# Patient Record
Sex: FEMALE | Race: BLACK OR AFRICAN AMERICAN | NOT HISPANIC OR LATINO | Employment: FULL TIME | URBAN - METROPOLITAN AREA
[De-identification: names, ages, dates, MRNs, and addresses within clinical notes are randomized per-mention and may not be internally consistent; named-entity substitution may affect disease eponyms.]

---

## 2017-01-09 ENCOUNTER — PATIENT MESSAGE (OUTPATIENT)
Dept: OBSTETRICS AND GYNECOLOGY | Facility: CLINIC | Age: 22
End: 2017-01-09

## 2017-02-01 ENCOUNTER — LAB VISIT (OUTPATIENT)
Dept: LAB | Facility: HOSPITAL | Age: 22
End: 2017-02-01
Attending: FAMILY MEDICINE
Payer: COMMERCIAL

## 2017-02-01 ENCOUNTER — OFFICE VISIT (OUTPATIENT)
Dept: FAMILY MEDICINE | Facility: CLINIC | Age: 22
End: 2017-02-01
Payer: COMMERCIAL

## 2017-02-01 VITALS
HEIGHT: 59 IN | TEMPERATURE: 98 F | RESPIRATION RATE: 12 BRPM | WEIGHT: 122.81 LBS | BODY MASS INDEX: 24.76 KG/M2 | SYSTOLIC BLOOD PRESSURE: 118 MMHG | OXYGEN SATURATION: 98 % | DIASTOLIC BLOOD PRESSURE: 74 MMHG | HEART RATE: 77 BPM

## 2017-02-01 LAB
B-HCG UR QL: NEGATIVE
CTP QC/QA: YES
HCG INTACT+B SERPL-ACNC: <1.2 MIU/ML

## 2017-02-01 PROCEDURE — 36415 COLL VENOUS BLD VENIPUNCTURE: CPT | Mod: PO

## 2017-02-01 PROCEDURE — 81025 URINE PREGNANCY TEST: CPT | Mod: S$GLB,,, | Performed by: PHYSICIAN ASSISTANT

## 2017-02-01 PROCEDURE — 99213 OFFICE O/P EST LOW 20 MIN: CPT | Mod: 25,S$GLB,, | Performed by: PHYSICIAN ASSISTANT

## 2017-02-01 PROCEDURE — 84702 CHORIONIC GONADOTROPIN TEST: CPT

## 2017-02-01 PROCEDURE — 99999 PR PBB SHADOW E&M-EST. PATIENT-LVL III: CPT | Mod: PBBFAC,,, | Performed by: PHYSICIAN ASSISTANT

## 2017-02-01 NOTE — PROGRESS NOTES
Subjective:       Patient ID: Kate Bailey is a 21 y.o. female.    Chief Complaint: Possible Pregnancy (positive home pregnacy test)    HPI: 22 yo female presents for positive home pregnancy. LMP 12/8/16. Pt has history of abnormal cycles. She is currently taking OCPs. She denies breast tenderness or nausea.     Review of Systems   Gastrointestinal: Negative for nausea.   Genitourinary: Positive for menstrual problem.        - breast tenderness       Objective:      Physical Exam   Constitutional: She is oriented to person, place, and time. She appears well-developed and well-nourished. No distress.   HENT:   Head: Normocephalic and atraumatic.   Neurological: She is alert and oriented to person, place, and time.   Skin: She is not diaphoretic.   Psychiatric: She has a normal mood and affect. Her behavior is normal.   Vitals reviewed.      Assessment:       1. Positive home pregnancy test        Plan:         Kate was seen today for possible pregnancy.    Diagnoses and all orders for this visit:    Positive home pregnancy test  -     POCT urine pregnancy, negative will get HCG  -     HCG, QUANTITATIVE, PREGNANCY; Future

## 2017-02-01 NOTE — MR AVS SNAPSHOT
[facility]""  [address]  [place] [address]  Phone: [phone]  Fax: [phone]                  Kate Bailey   2017 8:00 AM   Office Visit    Description:  Female : 1995   Provider:  Jenna Alvarez PA-C   Department:  [facility]""           Reason for Visit     Possible Pregnancy           Diagnoses this Visit        Comments    Positive home pregnancy test    -  Primary            To Do List           Future Appointments        Provider Department Dept Phone    2017 8:30 AM LAB, [facility] [phone]      Goals (5 Years of Data)     None      Follow-Up and Disposition     Return if symptoms worsen or fail to improve.      [facility]     [facility] Care [facility] -  Assistance  Registered nurses in the [facility] provide clinical advisement, health education, appointment booking, and other advisory services.  Call for this free service at [phone].             Medications           Message regarding Medications     Verify the changes and/or additions to your medication regime listed below are the same as discussed with your clinician today.  If any of these changes or additions are incorrect, please notify your healthcare provider.        STOP taking these medications     propranolol (INDERAL) 40 MG tablet Take 1 tablet (40 mg total) by mouth 3 (three) times daily.           Verify that the below list of medications is an accurate representation of the medications you are currently taking.  If none reported, the list may be blank. If incorrect, please contact your healthcare provider. Carry this list with you in case of emergency.           Current Medications     norethindrone-e.estradiol-iron 1 mg-20 mcg (24)/75 mg (4) Oral per tablet Take 1 tablet by mouth once daily.    triamcinolone acetonide 0.1% (KENALOG) 0.1 % cream Apply topically 2 (two) times daily.           Clinical  "Reference Information           Vital Signs - Last Recorded  Most recent update: 2/1/2017  8:16 AM by Eva Bailey MA    BP Pulse Temp Resp Ht    118/74 (BP Location: Right arm, Patient Position: Sitting, BP Method: Manual) 77 98.2 °F (36.8 °C) (Oral) 12 4' 11" (1.499 m)    Wt LMP SpO2 BMI    55.7 kg (122 lb 12.7 oz) 12/08/2016 98% 24.8 kg/m2      Blood Pressure          Most Recent Value    BP  118/74      Allergies as of 2/1/2017     No Known Allergies      Immunizations Administered on Date of Encounter - 2/1/2017     None      Orders Placed During Today's Visit      Normal Orders This Visit    POCT urine pregnancy     Future Labs/Procedures Expected by Expires    HCG, QUANTITATIVE, PREGNANCY  2/1/2017 4/2/2018      "

## 2017-02-06 ENCOUNTER — PATIENT MESSAGE (OUTPATIENT)
Dept: OBSTETRICS AND GYNECOLOGY | Facility: CLINIC | Age: 22
End: 2017-02-06

## 2017-02-14 ENCOUNTER — PATIENT MESSAGE (OUTPATIENT)
Dept: FAMILY MEDICINE | Facility: CLINIC | Age: 22
End: 2017-02-14

## 2017-02-23 ENCOUNTER — LAB VISIT (OUTPATIENT)
Dept: LAB | Facility: HOSPITAL | Age: 22
End: 2017-02-23
Attending: FAMILY MEDICINE
Payer: COMMERCIAL

## 2017-02-23 ENCOUNTER — OFFICE VISIT (OUTPATIENT)
Dept: FAMILY MEDICINE | Facility: CLINIC | Age: 22
End: 2017-02-23
Payer: COMMERCIAL

## 2017-02-23 VITALS
RESPIRATION RATE: 12 BRPM | DIASTOLIC BLOOD PRESSURE: 62 MMHG | WEIGHT: 125.69 LBS | SYSTOLIC BLOOD PRESSURE: 126 MMHG | TEMPERATURE: 99 F | HEART RATE: 103 BPM | HEIGHT: 59 IN | BODY MASS INDEX: 25.34 KG/M2 | OXYGEN SATURATION: 97 %

## 2017-02-23 DIAGNOSIS — Z00.00 ANNUAL PHYSICAL EXAM: ICD-10-CM

## 2017-02-23 DIAGNOSIS — Z00.00 ANNUAL PHYSICAL EXAM: Primary | ICD-10-CM

## 2017-02-23 DIAGNOSIS — Z23 NEED FOR HPV VACCINATION: ICD-10-CM

## 2017-02-23 DIAGNOSIS — G43.919 INTRACTABLE MIGRAINE WITHOUT STATUS MIGRAINOSUS, UNSPECIFIED MIGRAINE TYPE: ICD-10-CM

## 2017-02-23 LAB
ANION GAP SERPL CALC-SCNC: 7 MMOL/L
BASOPHILS # BLD AUTO: 0.01 K/UL
BASOPHILS NFR BLD: 0.1 %
BUN SERPL-MCNC: 9 MG/DL
CALCIUM SERPL-MCNC: 9.5 MG/DL
CHLORIDE SERPL-SCNC: 106 MMOL/L
CHOLEST/HDLC SERPL: 4.2 {RATIO}
CO2 SERPL-SCNC: 25 MMOL/L
CREAT SERPL-MCNC: 0.8 MG/DL
DIFFERENTIAL METHOD: NORMAL
EOSINOPHIL # BLD AUTO: 0.2 K/UL
EOSINOPHIL NFR BLD: 2.5 %
ERYTHROCYTE [DISTWIDTH] IN BLOOD BY AUTOMATED COUNT: 12.2 %
EST. GFR  (AFRICAN AMERICAN): >60 ML/MIN/1.73 M^2
EST. GFR  (NON AFRICAN AMERICAN): >60 ML/MIN/1.73 M^2
GLUCOSE SERPL-MCNC: 82 MG/DL
HCT VFR BLD AUTO: 38.5 %
HDL/CHOLESTEROL RATIO: 23.9 %
HDLC SERPL-MCNC: 180 MG/DL
HDLC SERPL-MCNC: 43 MG/DL
HGB BLD-MCNC: 13.2 G/DL
LDLC SERPL CALC-MCNC: 118.6 MG/DL
LYMPHOCYTES # BLD AUTO: 2.8 K/UL
LYMPHOCYTES NFR BLD: 37.2 %
MCH RBC QN AUTO: 30.4 PG
MCHC RBC AUTO-ENTMCNC: 34.3 %
MCV RBC AUTO: 89 FL
MONOCYTES # BLD AUTO: 0.6 K/UL
MONOCYTES NFR BLD: 8.2 %
NEUTROPHILS # BLD AUTO: 3.9 K/UL
NEUTROPHILS NFR BLD: 51.9 %
NONHDLC SERPL-MCNC: 137 MG/DL
PLATELET # BLD AUTO: 265 K/UL
PMV BLD AUTO: 10.8 FL
POTASSIUM SERPL-SCNC: 4.1 MMOL/L
RBC # BLD AUTO: 4.34 M/UL
SODIUM SERPL-SCNC: 138 MMOL/L
TRIGL SERPL-MCNC: 92 MG/DL
TSH SERPL DL<=0.005 MIU/L-ACNC: 0.4 UIU/ML
WBC # BLD AUTO: 7.58 K/UL

## 2017-02-23 PROCEDURE — 99999 PR PBB SHADOW E&M-EST. PATIENT-LVL III: CPT | Mod: PBBFAC,,, | Performed by: PHYSICIAN ASSISTANT

## 2017-02-23 PROCEDURE — 90651 9VHPV VACCINE 2/3 DOSE IM: CPT | Mod: S$GLB,,, | Performed by: PHYSICIAN ASSISTANT

## 2017-02-23 PROCEDURE — 84443 ASSAY THYROID STIM HORMONE: CPT

## 2017-02-23 PROCEDURE — 99395 PREV VISIT EST AGE 18-39: CPT | Mod: 25,S$GLB,, | Performed by: PHYSICIAN ASSISTANT

## 2017-02-23 PROCEDURE — 36415 COLL VENOUS BLD VENIPUNCTURE: CPT | Mod: PO

## 2017-02-23 PROCEDURE — 85025 COMPLETE CBC W/AUTO DIFF WBC: CPT

## 2017-02-23 PROCEDURE — 86703 HIV-1/HIV-2 1 RESULT ANTBDY: CPT

## 2017-02-23 PROCEDURE — 80048 BASIC METABOLIC PNL TOTAL CA: CPT

## 2017-02-23 PROCEDURE — 90471 IMMUNIZATION ADMIN: CPT | Mod: S$GLB,,, | Performed by: PHYSICIAN ASSISTANT

## 2017-02-23 PROCEDURE — 80061 LIPID PANEL: CPT

## 2017-02-23 RX ORDER — RIZATRIPTAN BENZOATE 10 MG/1
10 TABLET ORAL
Qty: 20 TABLET | Refills: 0 | Status: SHIPPED | OUTPATIENT
Start: 2017-02-23 | End: 2017-08-16

## 2017-02-23 NOTE — PROGRESS NOTES
Subjective:       Patient ID: Kate Bailey is a 21 y.o. female.    Chief Complaint: Annual Exam    HPI 20 yo female presents for annual. Complains of chronic migraines worse over the past couple months. Frontal, pressure in eyes. Tried tylenol, ibuprofen, Excedrin, beta blockers with no relief. Has nausea and sensitivity to light. Also having anxiety and depression. Has not been on meds in the past. States she feels like she has been handling it well on her own.     Review of Systems   Constitutional: Positive for unexpected weight change (3 lbs in 2 week). Negative for chills and fever.   Respiratory: Negative for cough, shortness of breath and wheezing.    Cardiovascular: Positive for palpitations. Negative for chest pain.   Gastrointestinal: Negative for abdominal pain, constipation, diarrhea, nausea and vomiting.   Genitourinary: Negative for dysuria, pelvic pain, vaginal discharge and vaginal pain.   Neurological: Positive for headaches.   Psychiatric/Behavioral: Positive for dysphoric mood and sleep disturbance. Negative for confusion and decreased concentration. The patient is nervous/anxious.        Objective:      Physical Exam   Constitutional: She is oriented to person, place, and time. She appears well-developed and well-nourished. No distress.   HENT:   Head: Normocephalic and atraumatic.   Cardiovascular: Normal rate and regular rhythm.    No murmur heard.  Pulmonary/Chest: Effort normal and breath sounds normal.   Abdominal: Soft. Bowel sounds are normal. She exhibits no distension. There is no tenderness. There is no rigidity and no guarding.   Neurological: She is alert and oriented to person, place, and time.   Tongue midline, facial expressions equal bilaterally, normal rapid hand movements, normal heel to shin, normal finger to nose     Skin: Skin is warm and dry. She is not diaphoretic.   Psychiatric: She has a normal mood and affect. Her behavior is normal.   Vitals reviewed.       Assessment:       1. Annual physical exam    2. Need for HPV vaccination    3. Intractable migraine without status migrainosus, unspecified migraine type        Plan:         Kate was seen today for annual exam.    Diagnoses and all orders for this visit:    Annual physical exam  -     TSH; Future  -     Basic metabolic panel; Future  -     Lipid panel; Future  -     CBC auto differential; Future  -     HIV-1 and HIV-2 antibodies; Future    Need for HPV vaccination  -     HPV Vaccine (9-Valent) (3 Dose) (IM)    Intractable migraine without status migrainosus, unspecified migraine type  -     rizatriptan (MAXALT) 10 MG tablet; Take 1 tablet (10 mg total) by mouth as needed for Migraine (may repeat in 2 hrs if no relief).

## 2017-02-23 NOTE — MR AVS SNAPSHOT
[facility]  [address]  [place] [address]  Phone: [phone]  Fax: [phone]                  Kate Bailey   2017 3:00 PM   Office Visit    Description:  Female : 1995   Provider:  Jenna Alvarez PA-C   Department:  [facility]           Reason for Visit     Annual Exam           Diagnoses this Visit        Comments    Annual physical exam    -  Primary     Need for HPV vaccination         Intractable migraine without status migrainosus, unspecified migraine type                To Do List           Future Appointments        Provider Department Dept Phone    2017 3:45 PM LAB, [facility] [phone]      Goals (5 Years of Data)     None       These Medications        Disp Refills Start End    rizatriptan (MAXALT) 10 MG tablet 20 tablet 0 2017 3/25/2017    Take 1 tablet (10 mg total) by mouth as needed for Migraine (may repeat in 2 hrs if no relief). - Oral    Pharmacy: [facility]/pharmacy #[id number] - [place] [address] #: [phone]         [facility]     [facility] On Call Nurse Care Line -  Assistance  Registered nurses in the [facility] provide clinical advisement, health education, appointment booking, and other advisory services.  Call for this free service at [phone].             Medications           Message regarding Medications     Verify the changes and/or additions to your medication regime listed below are the same as discussed with your clinician today.  If any of these changes or additions are incorrect, please notify your healthcare provider.        START taking these NEW medications        Refills    rizatriptan (MAXALT) 10 MG tablet 0    Sig: Take 1 tablet (10 mg total) by mouth as needed for Migraine (may repeat in 2 hrs if no relief).    Class: Normal    Route: Oral           Verify that the below list of medications is an accurate  "representation of the medications you are currently taking.  If none reported, the list may be blank. If incorrect, please contact your healthcare provider. Carry this list with you in case of emergency.           Current Medications     norethindrone-e.estradiol-iron 1 mg-20 mcg (24)/75 mg (4) Oral per tablet Take 1 tablet by mouth once daily.    rizatriptan (MAXALT) 10 MG tablet Take 1 tablet (10 mg total) by mouth as needed for Migraine (may repeat in 2 hrs if no relief).    triamcinolone acetonide 0.1% (KENALOG) 0.1 % cream Apply topically 2 (two) times daily.           Clinical Reference Information           Your Vitals Were     BP Pulse Temp Resp Height Weight    126/62 (BP Location: Left arm, Patient Position: Sitting, BP Method: Manual) 103 98.7 °F (37.1 °C) (Oral) 12 4' 11" (1.499 m) 57 kg (125 lb 10.6 oz)    Last Period SpO2 BMI          02/01/2017 97% 25.38 kg/m2        Blood Pressure          Most Recent Value    BP  126/62      Allergies as of 2/23/2017     No Known Allergies      Immunizations Administered on Date of Encounter - 2/23/2017     Name Date Dose VIS Date Route    HPV 9-Valent  Incomplete 0.5 mL 12/2/2016 Intramuscular      Orders Placed During Today's Visit      Normal Orders This Visit    HPV Vaccine (9-Valent) (3 Dose) (IM)     Future Labs/Procedures Expected by Expires    Basic metabolic panel  2/23/2017 2/23/2018    CBC auto differential  2/23/2017 2/23/2018    HIV-1 and HIV-2 antibodies  2/23/2017 2/23/2018    Lipid panel  2/23/2017 2/23/2018    TSH  2/23/2017 2/23/2018      Language Assistance Services     ATTENTION: Language assistance services are available, free of charge. Please call [phone].      ATENCIÓN: Si latishala octavio, tiene a garcia disposición servicios gratuitos de asistencia lingüística. Llame al [phone].     CHÚ Ý: N?u b?n nói Ti?ng Vi?t, có các d?ch v? h? tr? ngôn ng? mi?n phí dành cho b?n. G?i s? [phone].         [facility] complies " with applicable Federal civil rights laws and does not discriminate on the basis of race, color, national origin, age, disability, or sex.

## 2017-02-24 ENCOUNTER — HOSPITAL ENCOUNTER (EMERGENCY)
Facility: HOSPITAL | Age: 22
Discharge: HOME | End: 2017-02-24
Attending: EMERGENCY MEDICINE
Payer: COMMERCIAL

## 2017-02-24 VITALS
WEIGHT: 122 LBS | TEMPERATURE: 99 F | SYSTOLIC BLOOD PRESSURE: 125 MMHG | RESPIRATION RATE: 14 BRPM | HEART RATE: 110 BPM | OXYGEN SATURATION: 97 % | BODY MASS INDEX: 24.64 KG/M2 | DIASTOLIC BLOOD PRESSURE: 67 MMHG

## 2017-02-24 DIAGNOSIS — R55 SYNCOPE, UNSPECIFIED SYNCOPE TYPE: Primary | ICD-10-CM

## 2017-02-24 LAB
ALBUMIN SERPL BCP-MCNC: 4.1 G/DL
ALP SERPL-CCNC: 67 U/L
ALT SERPL W/O P-5'-P-CCNC: 35 U/L
ANION GAP SERPL CALC-SCNC: 9 MMOL/L
AST SERPL-CCNC: 27 U/L
B-HCG UR QL: NEGATIVE
BASOPHILS # BLD AUTO: 0.01 K/UL
BASOPHILS NFR BLD: 0.1 %
BILIRUB SERPL-MCNC: 0.7 MG/DL
BUN SERPL-MCNC: 8 MG/DL
CALCIUM SERPL-MCNC: 9.7 MG/DL
CHLORIDE SERPL-SCNC: 104 MMOL/L
CO2 SERPL-SCNC: 25 MMOL/L
CREAT SERPL-MCNC: 0.8 MG/DL
CTP QC/QA: YES
DIFFERENTIAL METHOD: ABNORMAL
EOSINOPHIL # BLD AUTO: 0 K/UL
EOSINOPHIL NFR BLD: 0 %
ERYTHROCYTE [DISTWIDTH] IN BLOOD BY AUTOMATED COUNT: 12 %
EST. GFR  (AFRICAN AMERICAN): >60 ML/MIN/1.73 M^2
EST. GFR  (NON AFRICAN AMERICAN): >60 ML/MIN/1.73 M^2
GLUCOSE SERPL-MCNC: 107 MG/DL
HCT VFR BLD AUTO: 40.1 %
HGB BLD-MCNC: 13.9 G/DL
HIV 1+2 AB+HIV1 P24 AG SERPL QL IA: NEGATIVE
LYMPHOCYTES # BLD AUTO: 0.2 K/UL
LYMPHOCYTES NFR BLD: 3 %
MCH RBC QN AUTO: 30.3 PG
MCHC RBC AUTO-ENTMCNC: 34.7 %
MCV RBC AUTO: 87 FL
MONOCYTES # BLD AUTO: 0.4 K/UL
MONOCYTES NFR BLD: 5.3 %
NEUTROPHILS # BLD AUTO: 7 K/UL
NEUTROPHILS NFR BLD: 91.3 %
PLATELET # BLD AUTO: 246 K/UL
PMV BLD AUTO: 10.2 FL
POTASSIUM SERPL-SCNC: 4 MMOL/L
PROT SERPL-MCNC: 7.2 G/DL
RBC # BLD AUTO: 4.59 M/UL
SODIUM SERPL-SCNC: 138 MMOL/L
WBC # BLD AUTO: 7.67 K/UL

## 2017-02-24 PROCEDURE — 96361 HYDRATE IV INFUSION ADD-ON: CPT

## 2017-02-24 PROCEDURE — 96374 THER/PROPH/DIAG INJ IV PUSH: CPT

## 2017-02-24 PROCEDURE — 80053 COMPREHEN METABOLIC PANEL: CPT

## 2017-02-24 PROCEDURE — 85025 COMPLETE CBC W/AUTO DIFF WBC: CPT

## 2017-02-24 PROCEDURE — 93005 ELECTROCARDIOGRAM TRACING: CPT

## 2017-02-24 PROCEDURE — 63600175 PHARM REV CODE 636 W HCPCS: Performed by: EMERGENCY MEDICINE

## 2017-02-24 PROCEDURE — 81025 URINE PREGNANCY TEST: CPT | Performed by: EMERGENCY MEDICINE

## 2017-02-24 PROCEDURE — 99284 EMERGENCY DEPT VISIT MOD MDM: CPT | Mod: 25

## 2017-02-24 PROCEDURE — 25000003 PHARM REV CODE 250: Performed by: EMERGENCY MEDICINE

## 2017-02-24 RX ORDER — PROCHLORPERAZINE EDISYLATE 5 MG/ML
5 INJECTION INTRAMUSCULAR; INTRAVENOUS
Status: COMPLETED | OUTPATIENT
Start: 2017-02-24 | End: 2017-02-24

## 2017-02-24 RX ADMIN — SODIUM CHLORIDE 1000 ML: 0.9 INJECTION, SOLUTION INTRAVENOUS at 12:02

## 2017-02-24 RX ADMIN — PROCHLORPERAZINE EDISYLATE 5 MG: 5 INJECTION INTRAMUSCULAR; INTRAVENOUS at 12:02

## 2017-02-24 NOTE — ED AVS SNAPSHOT
[facility]  [address] [place] [address]               Kate Beckman Leo   2017 11:31 AM   ED    Description:  Female : 1995   Department:  [facility]           Your Care was Coordinated By:     Provider Role From To    Raul Correa Jr., MD Attending Provider 17 1131 --      Reason for Visit     Possible allergic reaction to HPV vaccination     Loss of Consciousness           Diagnoses this Visit        Comments    Syncope, unspecified syncope type    -  Primary       ED Disposition     None           To Do List           Follow-up Information     Follow up with Richmond Hardy MD. Schedule an appointment as soon as possible for a visit in 2 days.    Specialty:  Family Medicine    Why:  Follow-up with your regular doctor next 2 days.  Return for new or worsening symptoms.  Rest.  Drink plan fluids.    Contact information:    [address]  [place] [id number] [phone]        [facility] On Call     [facility] Nurse Care [facility] -  Assistance  Registered nurses in the [facility] provide clinical advisement, health education, appointment booking, and other advisory services.  Call for this free service at [phone].             Medications           Message regarding Medications     Verify the changes and/or additions to your medication regime listed below are the same as discussed with your clinician today.  If any of these changes or additions are incorrect, please notify your healthcare provider.        These medications were administered today        Dose Freq    sodium chloride 0.9% bolus 1,000 mL 1,000 mL ED 1 Time    Sig: Inject 1,000 mLs into the vein ED 1 Time.    Class: Normal    Route: Intravenous    prochlorperazine injection Soln 5 mg 5 mg ED 1 Time    Sig: Inject 1 mL (5 mg total) into the vein ED 1 Time.    Class: Normal    Route: Intravenous      STOP taking these medications      norethindrone-e.estradiol-iron 1 mg-20 mcg (24)/75 mg (4) Oral per tablet Take 1 tablet by mouth once daily.           Verify that the below list of medications is an accurate representation of the medications you are currently taking.  If none reported, the list may be blank. If incorrect, please contact your healthcare provider. Carry this list with you in case of emergency.           Current Medications     rizatriptan (MAXALT) 10 MG tablet Take 1 tablet (10 mg total) by mouth as needed for Migraine (may repeat in 2 hrs if no relief).    triamcinolone acetonide 0.1% (KENALOG) 0.1 % cream Apply topically 2 (two) times daily.           Clinical Reference Information           Your Vitals Were     BP                   123/64           Allergies as of 2/24/2017     No Known Allergies      Immunizations Administered on Date of Encounter - 2/24/2017     None      ED Micro, Lab, POCT     Start Ordered       Status Ordering Provider    02/24/17 1139 02/24/17 1139  CBC auto differential  Once      Final result     02/24/17 1139 02/24/17 1139  Comprehensive metabolic panel  STAT      Final result     02/24/17 1118 02/24/17 1117  POCT urine pregnancy  Once      Final result       ED Imaging Orders     None      Discharge References/Attachments     SYNCOPE, CAUSES OF (ENGLISH)       [facility] complies with applicable Federal civil rights laws and does not discriminate on the basis of race, color, national origin, age, disability, or sex.        Language Assistance Services     ATTENTION: Language assistance services are available, free of charge. Please call [phone].      ATENCIÓN: Si habla español, tiene a garcia disposición servicios gratuitos de asistencia lingüística. Llame al [phone].     CHÚ Ý: N?u b?n nói Ti?ng Vi?t, có các d?ch v? h? tr? ngôn ng? mi?n phí dành cho b?n. G?i s? [phone].

## 2017-02-24 NOTE — ED TRIAGE NOTES
First HPV vaccine yesterday, this morning started having nausea, dizziness, emesis x6. +Arm tenderness no redness noted. +LOC this morning. Unsure if she hit her head on anything.

## 2017-02-24 NOTE — ED PROVIDER NOTES
Encounter Date: 2/24/2017       History     Chief Complaint   Patient presents with    Possible allergic reaction to HPV vaccination     thinks she is having reaction to HPV vaccination; nausea, emesis, drowsiness, and feeling lightheaded; states she passed out this morning at 0800 and lost consciousness    Loss of Consciousness     states she does not remember what happened and she woke up on floor     Review of patient's allergies indicates:  No Known Allergies  HPI Comments: Chief complaint: Nausea, vomiting, lightheadedness, syncope    History of present illness: 21-year-old female resents the emergency department with complaints of nausea, vomiting, lightheadedness, and syncopal episode that happened this morning.  Patient was concerned that she may be having reaction to HPV vaccination from yesterday.  Patient has a mild headache.  She denies fever or chills.  She denies cough, shortness of breath, urinary symptoms, abdominal pain, diarrhea, skin changes, or lower extremity edema.     Past Medical History:   Diagnosis Date    Migraine     Recurrent epistaxis      Past Surgical History:   Procedure Laterality Date    ADENOIDECTOMY      TONSILLECTOMY       Family History   Problem Relation Age of Onset    Cancer Paternal Grandfather      breast     Hypertension Maternal Grandmother      Social History   Substance Use Topics    Smoking status: Never Smoker    Smokeless tobacco: None    Alcohol use No     Review of Systems   Constitutional: Negative for chills, diaphoresis and fever.   HENT: Negative for rhinorrhea, sinus pressure and sore throat.    Respiratory: Negative for shortness of breath.    Cardiovascular: Negative for chest pain and palpitations.   Gastrointestinal: Positive for nausea and vomiting. Negative for abdominal distention, abdominal pain, constipation and diarrhea.   Genitourinary: Negative for dysuria, flank pain, frequency and urgency.   Musculoskeletal: Negative for back pain,  myalgias and neck pain.   Skin: Negative for color change, pallor and rash.   Neurological: Positive for syncope, light-headedness and headaches. Negative for weakness.   Hematological: Does not bruise/bleed easily.   Psychiatric/Behavioral: Negative for agitation and behavioral problems.       Physical Exam   Initial Vitals   BP Pulse Resp Temp SpO2   02/24/17 1112 02/24/17 1112 02/24/17 1112 02/24/17 1112 02/24/17 1112   123/64 115 20 98.8 °F (37.1 °C) 97 %     Physical Exam    Nursing note and vitals reviewed.  Constitutional: She appears well-developed and well-nourished. She is not diaphoretic. No distress.   Alert, well-appearing female in no acute distress.   HENT:   Head: Normocephalic and atraumatic.   Right Ear: External ear normal.   Left Ear: External ear normal.   Nose: Nose normal.   Mouth/Throat: Oropharynx is clear and moist.   Eyes: Conjunctivae and EOM are normal. Pupils are equal, round, and reactive to light. Right eye exhibits no discharge. Left eye exhibits no discharge. No scleral icterus.   Neck: Normal range of motion. Neck supple.   Cardiovascular: Normal rate, regular rhythm, normal heart sounds and intact distal pulses.   No murmur heard.  Pulmonary/Chest: Breath sounds normal. No respiratory distress. She has no wheezes. She has no rhonchi. She has no rales.   Abdominal: Soft. Bowel sounds are normal. She exhibits no distension and no mass. There is no tenderness. There is no rebound and no guarding.   Musculoskeletal: Normal range of motion. She exhibits no edema or tenderness.   Neurological: She is alert and oriented to person, place, and time. She has normal strength. No cranial nerve deficit or sensory deficit.   GCS is 15.  Patient is alert and oriented to person, place, time, and events.  Cranial nerves II-XII intact by exam.  Strength and sensation equal and intact in all 4 extremities.  There is no evidence of ataxia or dismetria.   Skin: Skin is warm and dry. No rash noted. No  erythema. No pallor.   Psychiatric: She has a normal mood and affect. Her behavior is normal. Judgment and thought content normal.         ED Course   Procedures  Labs Reviewed   CBC W/ AUTO DIFFERENTIAL   COMPREHENSIVE METABOLIC PANEL   POCT URINE PREGNANCY     EKG Readings: (Independently Interpreted)   Initial Reading: No STEMI.   EKG reviewed and interpreted by me shows sinus rhythm at a rate of 96.  IL, QRS, QT intervals within normal limits.  Is a right axis deviation.  There is normal R-wave progression.  There are no ST segment or T-wave ischemic findings.          Medical Decision Making:   ED Management:  This is the emergent evaluation of a 19-year-old female presents the emergency department complaining of lightheadedness, nausea, vomiting that started this morning.  Patient also state that she had a syncopal episode.  No associated injuries. Differential diagnosis at the time of initial evaluation included, but was not limited to: Vasovagal episode, ectopic pregnancy, severe anemia, metabolic disturbance, dehydration, viral illness.  I also considered cardiac dysrhythmia.    Patient feeling better after hydration in the emergency department.  Pregnancy test is negative.  No anemia or leukocytosis.  No metabolic disturbance.  Patient is ambulatory and back to baseline at this time.  Believe she is safe and stable for discharge.  Advise rest, drink plan fluids, thiamine with her PCP in the next 24-48 hours.  She was also advised return for any new or worsening symptoms.                     ED Course     Clinical Impression:   Syncope, nausea, lightheadedness        Raul Correa Jr., MD  02/24/17 2575

## 2017-03-26 ENCOUNTER — PATIENT MESSAGE (OUTPATIENT)
Dept: OBSTETRICS AND GYNECOLOGY | Facility: CLINIC | Age: 22
End: 2017-03-26

## 2017-03-27 ENCOUNTER — TELEPHONE (OUTPATIENT)
Dept: OBSTETRICS AND GYNECOLOGY | Facility: CLINIC | Age: 22
End: 2017-03-27

## 2017-03-27 DIAGNOSIS — N92.6 IRREGULAR PERIODS/MENSTRUAL CYCLES: ICD-10-CM

## 2017-03-27 DIAGNOSIS — Z30.011 INITIATION OF OCP (BCP): Primary | ICD-10-CM

## 2017-03-27 RX ORDER — NORGESTIMATE AND ETHINYL ESTRADIOL 0.25-0.035
1 KIT ORAL DAILY
Qty: 90 TABLET | Refills: 1 | Status: SHIPPED | OUTPATIENT
Start: 2017-03-27 | End: 2017-08-15 | Stop reason: CLARIF

## 2017-04-12 ENCOUNTER — OFFICE VISIT (OUTPATIENT)
Dept: OBSTETRICS AND GYNECOLOGY | Facility: CLINIC | Age: 22
End: 2017-04-12
Payer: COMMERCIAL

## 2017-04-12 VITALS
BODY MASS INDEX: 25.34 KG/M2 | SYSTOLIC BLOOD PRESSURE: 118 MMHG | HEIGHT: 59 IN | DIASTOLIC BLOOD PRESSURE: 70 MMHG | WEIGHT: 125.69 LBS

## 2017-04-12 DIAGNOSIS — Z30.09 FAMILY PLANNING COUNSELING: ICD-10-CM

## 2017-04-12 DIAGNOSIS — Z01.419 WELL WOMAN EXAM WITH ROUTINE GYNECOLOGICAL EXAM: Primary | ICD-10-CM

## 2017-04-12 PROCEDURE — 99395 PREV VISIT EST AGE 18-39: CPT | Mod: S$GLB,,, | Performed by: OBSTETRICS & GYNECOLOGY

## 2017-04-12 PROCEDURE — 99999 PR PBB SHADOW E&M-EST. PATIENT-LVL II: CPT | Mod: PBBFAC,,, | Performed by: OBSTETRICS & GYNECOLOGY

## 2017-04-12 RX ORDER — TRETINOIN 0.5 MG/G
CREAM TOPICAL
Refills: 1 | COMMUNITY
Start: 2017-03-22 | End: 2017-08-16

## 2017-04-12 RX ORDER — DOXYCYCLINE 100 MG/1
100 CAPSULE ORAL DAILY
Refills: 3 | COMMUNITY
Start: 2017-03-22 | End: 2017-08-16

## 2017-04-12 NOTE — MR AVS SNAPSHOT
[facility]  [address] [facility] [address]  [place] [address]  Phone: [phone]                  Kaet Bailey   2017 1:30 PM   Office Visit    Description:  Female : 1995   Provider:  Harshal Brooks MD   Department:  [facility]           Reason for Visit     Pelvic Pain                To Do List           Goals (5 Years of Data)     None      [facility]     [facility] Nurse Care Line -  Assistance  Unless otherwise directed by your provider, please contact [facility], our nurse care line that is available for  assistance.     Registered nurses in the [facility] provide: appointment scheduling, clinical advisement, health education, and other advisory services.  Call: [phone] (toll free)               Medications           Message regarding Medications     Verify the changes and/or additions to your medication regime listed below are the same as discussed with your clinician today.  If any of these changes or additions are incorrect, please notify your healthcare provider.             Verify that the below list of medications is an accurate representation of the medications you are currently taking.  If none reported, the list may be blank. If incorrect, please contact your healthcare provider. Carry this list with you in case of emergency.           Current Medications     norgestimate-ethinyl estradiol (SPRINTEC, 28,) 0.25-35 mg-mcg per tablet Take 1 tablet by mouth once daily.    doxycycline (VIBRAMYCIN) 100 MG Cap Take 100 mg by mouth once daily.    rizatriptan (MAXALT) 10 MG tablet Take 1 tablet (10 mg total) by mouth as needed for Migraine (may repeat in 2 hrs if no relief).    tretinoin (RETIN-A) 0.05 % cream APPLY TOPICALLY ONCE NIGHTLY AT BEDTIME    triamcinolone acetonide 0.1% (KENALOG) 0.1 % cream Apply topically 2 (two) times daily.           Clinical Reference Information           Your Vitals Were     BP Height  "Weight Last Period BMI    118/70 (BP Location: Left arm, Patient Position: Sitting, BP Method: Manual) 4' 11" (1.499 m) 57 kg (125 lb 10.6 oz) 03/16/2017 (Exact Date) 25.38 kg/m2      Blood Pressure          Most Recent Value    BP  118/70      Allergies as of 4/12/2017     No Known Allergies      Immunizations Administered on Date of Encounter - 4/12/2017     None      Language Assistance Services     ATTENTION: Language assistance services are available, free of charge. Please call [phone].      ATENCIÓN: Si habla español, tiene a garcia disposición servicios gratuitos de asistencia lingüística. Llame al [phone].     CHÚ Ý: N?u b?n nói Ti?ng Vi?t, có các d?ch v? h? tr? ngôn ng? mi?n phí dành cho b?n. G?i s? [phone].         [facility] complies with applicable Federal civil rights laws and does not discriminate on the basis of race, color, national origin, age, disability, or sex.        "

## 2017-04-15 NOTE — PROGRESS NOTES
[facility]  Ambulatory Clinic  Obstetrics & Gynecology    Visit Date:  4/12/2017    Chief Complaint:  Annual GYN exam    History of Present Illness:      Kate Bailey is a 22 y.o. G0, new pt to me, here for a gynecologic exam.    Pt has no major complaints today.      Periods are regular, not heavy or painful.    Pt current method of family planning is OCP (estrogen/progesterone), and reports no problems with this method.      Pt denies family h/o adverse reaction to hormonal contraception.    Pt denies h/o abnormal pap, last pap ~7/2015 normal.    Pt denies active sexually transmitted infections.    Pt performs monthly self breast examination, non-smoker, uses seat belts, and denies abuse.     Pt denies any abnormal vaginal bleeding, vaginal discharge, dysmenorrhea, dyspareunia, pelvic pain, bloating, early satiety, unintentional weight loss, breast mass/skin changes, incontinence, GI or urinary complaints.      Otherwise, the pt is in her usual state of health and has good follow-up with her PCP.    Past History:  Gynecologic history as noted above.    Review of Systems:      GENERAL:  No fever, fatigue, excessive weight gain or loss  HEENT:  No headaches, hearing changes, visual disturbance  RESPIRATORY:  No cough, shortness of breath  CARDIOVASCULAR:  No chest pain, heart palpitations, leg swelling  BREAST:  No lump, pain, nipple discharge, skin changes  GASTROINTESTINAL:  No nausea, vomiting, constipation, diarrhea, abd pain, rectal bleeding   GENITOURINARY:  See HPI  ENDOCRINE:  No heat or cold intolerance  HEMATOLOGIC:  No easy bruisability or bleeding   LYMPHATICS:  No enlarged nodes  MUSCULOSKELETAL:  No joint pain or swelling  SKIN:  No rash, lesions, jaundice  NEUROLOGIC:  No dizziness, weakness, syncope  PSYCHIATRIC:  No depression, homicidal/suicidal ideations, anxiety or mood swings    Physical Exam:     /70 (BP Location: Left arm, Patient Position: Sitting, BP  "Method: Manual)  Ht 4' 11" (1.499 m)  Wt 57 kg (125 lb 10.6 oz)  LMP 03/16/2017 (Exact Date)  BMI 25.38 kg/m2  Pulse 70, Resp rate 16  Patient's last menstrual period was 03/16/2017 (exact date).     GENERAL:  No acute distress, well-nourished  HEENT:  Atraumatic, anicteric, moist mucus membranes. Neck supple w/o masses.  BREAST:  Symmetric, nontender, no obvious masses, adenopathy, skin changes or nipple discharge.  LUNGS:  Clear to auscultation  HEART:  Regular rate and rhythm, no murmurs, gallops, or rubs  ABDOMEN:  Soft, non-tender, non-distended, normoactive bowel sounds, no obvious organomegaly  EXT:  Symmetric w/o cramping, claudication, or edema. +2 distal pulses.  SKIN:  No rashes or bruising  PSYCH:  Mood and affect appropriate    GENITOURINARY:    VULVAR:  Female external genitalia w/o obvious lesions. Female hair distribution. Normal urethral meatus. No gross lymphadenopathy.   VAGINA:  Pink, moist, well-rugated. Good support. No obvious lesion. No discharge.  CERVIX:  No cervical motion tenderness, discharge, or obvious lesions.   UTERUS:  Small, non-tender, normal contour  ADNEXA:  No masses, non-tender    RECTAL:  Declined. No obvious external lesions  WET PREP:  Negative     Chaperone present for exam.    Assessment:     22 y.o. G0:    1. Well woman gynecologic exam  2. Family planning    Plan:    A gynecologic health assessment was performed with age appropriate counseling.    Cervical cancer screening - pap up to date.    STI screening - pt declined.  Safe sex discussed.      Continue OCP (sprintec 28).  Risks, benefits, and alternatives to OCP reviewed.    Encourage healthy lifestyle modifications, monthly self breast exams, encourage HPV vaccination, Ca/Vit D.    F/u with PCP for health maintenance.    Return 1 year for gynecologic exam, or sooner as needed.  All questions answered, pt voiced understanding.        Harshal Brooks MD        "

## 2017-06-14 ENCOUNTER — HOSPITAL ENCOUNTER (EMERGENCY)
Facility: HOSPITAL | Age: 22
Discharge: LEFT BEFORE DISCHARGE | End: 2017-06-14
Attending: EMERGENCY MEDICINE
Payer: COMMERCIAL

## 2017-06-14 ENCOUNTER — PATIENT MESSAGE (OUTPATIENT)
Dept: OBSTETRICS AND GYNECOLOGY | Facility: CLINIC | Age: 22
End: 2017-06-14

## 2017-06-14 VITALS
HEART RATE: 123 BPM | BODY MASS INDEX: 26.32 KG/M2 | RESPIRATION RATE: 14 BRPM | DIASTOLIC BLOOD PRESSURE: 57 MMHG | WEIGHT: 122 LBS | TEMPERATURE: 100 F | OXYGEN SATURATION: 100 % | HEIGHT: 57 IN | SYSTOLIC BLOOD PRESSURE: 111 MMHG

## 2017-06-14 DIAGNOSIS — E86.0 DEHYDRATION: ICD-10-CM

## 2017-06-14 DIAGNOSIS — R10.84 GENERALIZED ABDOMINAL PAIN: ICD-10-CM

## 2017-06-14 DIAGNOSIS — R00.0 TACHYCARDIA: Primary | ICD-10-CM

## 2017-06-14 LAB
ALBUMIN SERPL BCP-MCNC: 3.5 G/DL
ALP SERPL-CCNC: 61 U/L
ALT SERPL W/O P-5'-P-CCNC: 17 U/L
ANION GAP SERPL CALC-SCNC: 7 MMOL/L
AST SERPL-CCNC: 18 U/L
B-HCG UR QL: NEGATIVE
BASOPHILS # BLD AUTO: 0.01 K/UL
BASOPHILS NFR BLD: 0.1 %
BILIRUB SERPL-MCNC: 0.9 MG/DL
BUN SERPL-MCNC: 7 MG/DL
CALCIUM SERPL-MCNC: 9.7 MG/DL
CHLORIDE SERPL-SCNC: 102 MMOL/L
CO2 SERPL-SCNC: 27 MMOL/L
CREAT SERPL-MCNC: 0.8 MG/DL
CTP QC/QA: YES
DIFFERENTIAL METHOD: ABNORMAL
EOSINOPHIL # BLD AUTO: 0 K/UL
EOSINOPHIL NFR BLD: 0.4 %
ERYTHROCYTE [DISTWIDTH] IN BLOOD BY AUTOMATED COUNT: 12.1 %
EST. GFR  (AFRICAN AMERICAN): >60 ML/MIN/1.73 M^2
EST. GFR  (NON AFRICAN AMERICAN): >60 ML/MIN/1.73 M^2
GLUCOSE SERPL-MCNC: 95 MG/DL
HCT VFR BLD AUTO: 40 %
HGB BLD-MCNC: 13.8 G/DL
LYMPHOCYTES # BLD AUTO: 1.6 K/UL
LYMPHOCYTES NFR BLD: 14.7 %
MCH RBC QN AUTO: 30.5 PG
MCHC RBC AUTO-ENTMCNC: 34.5 %
MCV RBC AUTO: 88 FL
MONOCYTES # BLD AUTO: 0.7 K/UL
MONOCYTES NFR BLD: 6.6 %
NEUTROPHILS # BLD AUTO: 8.4 K/UL
NEUTROPHILS NFR BLD: 78.1 %
PLATELET # BLD AUTO: 284 K/UL
PMV BLD AUTO: 10 FL
POTASSIUM SERPL-SCNC: 3.7 MMOL/L
PROT SERPL-MCNC: 7.4 G/DL
RBC # BLD AUTO: 4.53 M/UL
SODIUM SERPL-SCNC: 136 MMOL/L
WBC # BLD AUTO: 10.77 K/UL

## 2017-06-14 PROCEDURE — 85025 COMPLETE CBC W/AUTO DIFF WBC: CPT

## 2017-06-14 PROCEDURE — 81025 URINE PREGNANCY TEST: CPT | Performed by: EMERGENCY MEDICINE

## 2017-06-14 PROCEDURE — 80053 COMPREHEN METABOLIC PANEL: CPT

## 2017-06-14 PROCEDURE — 99284 EMERGENCY DEPT VISIT MOD MDM: CPT

## 2017-06-14 RX ORDER — ONDANSETRON 2 MG/ML
4 INJECTION INTRAMUSCULAR; INTRAVENOUS
Status: DISCONTINUED | OUTPATIENT
Start: 2017-06-14 | End: 2017-06-15 | Stop reason: HOSPADM

## 2017-06-15 NOTE — ED PROVIDER NOTES
Encounter Date: 6/14/2017    SCRIBE #1 NOTE: I, Vivian Alvarado, am scribing for, and in the presence of,  SAL Weiss. I have scribed the following portions of the note - Other sections scribed: HPI and ROS .       History     Chief Complaint   Patient presents with    Abdominal Pain     Pt presents to ER due to abdominal pain for the past 4 days. Pt complains of nausea but not emesis today.      Review of patient's allergies indicates:  No Known Allergies  CC: Abdominal Pain    HPI: This 22 y.o. Female presents to the ED c/o acute, constant, 7/10 abdominal pain that began 5 days ago.  Patient reports the pain began later on the evening after eating seafood earlier that day.  Patient reports the pain is worse with ambulating, laying, and with attempts at having a bowel movement.  Patient reports of associated nausea, an episode of emesis after initial onset, slight fever, and constipation.  Patient reports her last normal bowel movement 6 days ago.  Patient reports attempting treatment with Tylenol, but only reports improvement of her fever.  Patient denies vaginal bleeding, vaginal discharge, back pain, chest pain, diarrhea, dysuria, urinary frequency, urinary urgency, or any other associated symptoms.  No prior h/o UTI.  Last menstrual cycle 5/18/17.      The history is provided by the patient. No  was used.     Past Medical History:   Diagnosis Date    Migraine     Recurrent epistaxis      Past Surgical History:   Procedure Laterality Date    ADENOIDECTOMY      TONSILLECTOMY       Family History   Problem Relation Age of Onset    Cancer Paternal Grandfather      breast     Hypertension Maternal Grandmother      Social History   Substance Use Topics    Smoking status: Never Smoker    Smokeless tobacco: Not on file    Alcohol use No     Review of Systems   Constitutional: Positive for fever. Negative for chills.   HENT: Negative for ear pain and sore throat.    Eyes: Negative  for pain.   Respiratory: Negative for cough and shortness of breath.    Cardiovascular: Negative for chest pain.   Gastrointestinal: Positive for abdominal pain, constipation, nausea and vomiting. Negative for diarrhea.   Genitourinary: Negative for difficulty urinating, dysuria, frequency, hematuria, urgency, vaginal bleeding and vaginal discharge.   Musculoskeletal: Negative for back pain.   Skin: Negative for rash.   Neurological: Negative for headaches.       Physical Exam     Initial Vitals [06/14/17 1900]   BP Pulse Resp Temp SpO2   (!) 111/57 (!) 123 14 99.6 °F (37.6 °C) 100 %     Physical Exam    Nursing note and vitals reviewed.  Constitutional: She appears well-developed and well-nourished.   HENT:   Head: Normocephalic.   Mouth/Throat: Oropharynx is clear and moist.   Eyes: Conjunctivae are normal.   Neck: Normal range of motion. Neck supple.   Cardiovascular: Regular rhythm and normal heart sounds.   Tachycardia   Pulmonary/Chest: Breath sounds normal.   Abdominal: Soft. Bowel sounds are normal. She exhibits no distension and no mass. There is tenderness. There is guarding. There is no rebound.   Patient's abdominal pain is diffuse and she is involuntary guarding with palpation.   Musculoskeletal: Normal range of motion.   Neurological: She is alert and oriented to person, place, and time. No sensory deficit.   Skin: Skin is warm and dry. Capillary refill takes less than 2 seconds.         ED Course   Procedures  Labs Reviewed   CBC W/ AUTO DIFFERENTIAL - Abnormal; Notable for the following:        Result Value    Gran # 8.4 (*)     Gran% 78.1 (*)     Lymph% 14.7 (*)     All other components within normal limits   COMPREHENSIVE METABOLIC PANEL - Abnormal; Notable for the following:     Anion Gap 7 (*)     All other components within normal limits   URINALYSIS   POCT URINE PREGNANCY             Medical Decision Making:   Initial Assessment:   21-year-old female presents with abdominal pain ×5  days.  Differential Diagnosis:   Appendicitis  Perforation  Constipation  ED Management:  Pts exam was as a for diffuse tenderness and voluntary guarding.; pt does not appear ill or toxic, pt is afebrile and mildly tachycardic., no focal neurological deficits, heart and lung sounds normal    Labs were reviewed and discussed with pt. IV was attempted and patient in attempts to combat dehydration.  CT scan ordered for further evaluation of abdominal pain.  Patient has decided that she does not want a second attempt of her IV and does not want to get a CT scan.  Patient has signed AMA paperwork, which I discussed with her risk of leaving before full evaluation and treatment.  Patient verbalizes understanding and states will return if pain worse otherwise she will follow-up with her OB GEN on Friday as planned.    Case discussed with attending who agrees with A&P                Scribe Attestation:   Scribe #1: I performed the above scribed service and the documentation accurately describes the services I performed. I attest to the accuracy of the note.    Attending Attestation:           Physician Attestation for Scribe:  Physician Attestation Statement for Scribe #1: I, Cindy Anthony NP-C, reviewed documentation, as scribed by Vivian Alvarado in my presence, and it is both accurate and complete.                 ED Course     Clinical Impression:   The primary encounter diagnosis was Tachycardia. Diagnoses of Dehydration and Generalized abdominal pain were also pertinent to this visit.    Disposition:   Disposition: AMA  Condition: Stable       Cindy Anthony NP  06/14/17 [id number]

## 2017-06-15 NOTE — ED TRIAGE NOTES
"abd pain with minimal walking "slight fever" x5 days, and abd cramping with BMs last normal one was 5 days ago  "

## 2017-08-15 ENCOUNTER — TELEPHONE (OUTPATIENT)
Dept: FAMILY MEDICINE | Facility: CLINIC | Age: 22
End: 2017-08-15

## 2017-08-15 DIAGNOSIS — Z30.41 FAMILY PLANNING, BCP (BIRTH CONTROL PILLS) MAINTENANCE: Primary | ICD-10-CM

## 2017-08-16 ENCOUNTER — OFFICE VISIT (OUTPATIENT)
Dept: OBSTETRICS AND GYNECOLOGY | Facility: CLINIC | Age: 22
End: 2017-08-16
Payer: COMMERCIAL

## 2017-08-16 VITALS
WEIGHT: 122.5 LBS | BODY MASS INDEX: 26.43 KG/M2 | HEIGHT: 57 IN | SYSTOLIC BLOOD PRESSURE: 102 MMHG | TEMPERATURE: 99 F | DIASTOLIC BLOOD PRESSURE: 62 MMHG

## 2017-08-16 DIAGNOSIS — E28.2 PCOS (POLYCYSTIC OVARIAN SYNDROME): ICD-10-CM

## 2017-08-16 DIAGNOSIS — Z31.89 ENCOUNTER FOR FERTILITY PLANNING: Primary | ICD-10-CM

## 2017-08-16 PROCEDURE — 3008F BODY MASS INDEX DOCD: CPT | Mod: S$GLB,,, | Performed by: OBSTETRICS & GYNECOLOGY

## 2017-08-16 PROCEDURE — 99999 PR PBB SHADOW E&M-EST. PATIENT-LVL III: CPT | Mod: PBBFAC,,, | Performed by: OBSTETRICS & GYNECOLOGY

## 2017-08-16 PROCEDURE — 99213 OFFICE O/P EST LOW 20 MIN: CPT | Mod: S$GLB,,, | Performed by: OBSTETRICS & GYNECOLOGY

## 2017-08-16 NOTE — PATIENT INSTRUCTIONS
Understanding Fertility Problems: The Womans Evaluation    To help your doctor look for the cause of fertility issues, you will have an evaluation. It will include a medical history, physical exam, and some basic tests. If needed, your doctor may also suggest procedures. These allow him or her to look at your reproductive organs.  Medical history  Your doctor will ask about your health and lifestyle. Youll also be asked about factors that can affect your ability to get pregnant such as how often you have sex, your menstrual cycle and any medicines or herbs you take. Be sure to mention any prior pregnancies or surgeries. You should also mention if youve had any pelvic infections or sexually transmitted infections.  Physical exam  A physical exam helps your doctor learn about your health. It includes a pelvic exam to check for swelling, infection, or other problems. Your hormone function is also checked. To do this, your doctor looks at your breast development, body fat, and hair distribution.  Basic tests  Your evaluation will likely include some basic tests. These include blood tests. In some cases, it also includes tests that check the health of your cervix.  · Blood tests can be used to check the following factors:  ¨ Hormone levels (FSH, LH, AMH, estrogen, and progesterone, prolactin)  ¨ Blood sugar and insulin levels  ¨ Tests for current or prior pelvic infection  ¨ Thyroid function  · Cervical cultures are samples taken from the cervix using a sterile swab. The sample is then sent to a lab and checked for signs of infections that can affect fertility.  Imaging tests  Your doctor may recommend that you have imaging tests. These show the reproductive organs in more detail. These tests may be done in the doctors office or in a hospital or surgery center. In most cases, they cause little or no discomfort.  · HSG (hysterosalpingogram). This is an X-ray test. It is used to view the shape of the uterus and make  sure the fallopian tubes are open. During the test, contrast fluid is placed in the uterus and fallopian tubes. The contrast makes it easier to see problems on the X-rays.  · Ultrasound. This uses painless sound waves to make images of internal organs. This can help show problems with the ovaries or uterine lining.  · Sonohysterogram. This is an ultrasound done with sterile saltwater (saline) solution placed in the uterus. The saline makes it easier to view the inside of the uterus.  Other tests  If needed, your doctor may suggest other, less common tests. Some can be done in your doctors office. Others are done in a hospital or surgery center. For certain procedures, youll be given anesthesia to prevent discomfort.  · Hysteroscopy. This uses a small, lighted tube called a hysteroscope to view the inside of the cervix and uterus. It is usually done just after your period.  · Laparoscopy. This is a type of surgery that can help reveal problems on the surface of the reproductive organs. A thin, lighted tube device called a laparoscope is inserted into the body. Your doctor then views the reproductive organs through the scope.  Date Last Reviewed: 5/21/2015  © 9729-2091 [employer]. [address] [place] [address], [place] [address]. All rights reserved. This information is not intended as a substitute for professional medical care. Always follow your healthcare professional's instructions.

## 2017-08-16 NOTE — PROGRESS NOTES
Kate Bailey presents for fertility discussion.    Length of attempt at conception: 5 years.  Same partner.  Her boyfriend  is 30 yo, he has two children (8 and 11)    Methods of attempt: timed intercourse using a phone molly. Never tried OPK    Menses:  H/o PCOS.  Cycles every 1-3 months.  Denies intermenstrual bleeding.  Mild cramp.      Contraception:  She was on birth control pills on and off over the past 5 years to regulate a period.  Over the past 3 months, she is not on any birth control pills.    Abnormal pap: No    STD:  denies    DENISHA exposure in utero:  No    Non-prescribed drugs/Herbal: None    Occupation: customer representative  Exposure to hazardous materials: None    Personal, family, and partner's genetic history: Negative.     Pt's Ethnicity: Mixed - / central american, Partner's ethnicity:     Sickle cell: denies    Down Syndrome: denies    Cystic Fibrosis: denies     Autism: denies    Birth Defects: Denies    Previous evaluation: None. Except for pelvic US in 2016 with normal uterus, b/l with small follicles.     Previous treatment: none    OB History    Para Term  AB Living   0 0 0 0 0 0   SAB TAB Ectopic Multiple Live Births   0 0 0 0           Obstetric Comments   Gynhx;  13/Every 1-3 month/7-14 days   H/o PCOS   Pap neg 2015   Denies STD       GYN Surgery:   Past Surgical History:   Procedure Laterality Date    ADENOIDECTOMY      TONSILLECTOMY         Past Medical History:   Diagnosis Date    Migraine     Recurrent epistaxis        Vaccinations:   Immunization History   Administered Date(s) Administered    HPV 9-Valent 2017    PPD Test 2015    influenza - Quadrivalent 2015       Medications:   Current Outpatient Prescriptions on File Prior to Visit   Medication Sig Dispense Refill    norethindrone-e.estradiol-iron (LO LOESTRIN FE) 1 mg-10 mcg (24)/10 mcg (2) Tab Take 1 tablet by mouth once daily. Apply discount  "code: BIN# [id number], PCN# [id number], GRP# [id number], ID# [id number] 90 tablet 3    [DISCONTINUED] doxycycline (VIBRAMYCIN) 100 MG Cap Take 100 mg by mouth once daily.  3    [DISCONTINUED] rizatriptan (MAXALT) 10 MG tablet Take 1 tablet (10 mg total) by mouth as needed for Migraine (may repeat in 2 hrs if no relief). 20 tablet 0    [DISCONTINUED] tretinoin (RETIN-A) 0.05 % cream APPLY TOPICALLY ONCE NIGHTLY AT BEDTIME  1    [DISCONTINUED] triamcinolone acetonide 0.1% (KENALOG) 0.1 % cream Apply topically 2 (two) times daily. 28.4 g 1     No current facility-administered medications on file prior to visit.        /62 (BP Location: Right arm, Patient Position: Sitting, BP Method: Medium (Manual))   Temp 98.6 °F (37 °C) (Oral)   Ht 4' 9" (1.448 m)   Wt 55.6 kg (122 lb 8 oz)   LMP 07/14/2017 (Exact Date)   BMI 26.51 kg/m²   General: NAD, well nourish, A&O x 4      A/P  1. Desires fertility:  Discussed with patient at length normal female anatomy, timed intercourse, causes of infertility.  Given that pt has been trying on and off over the past 5 years and particularly has been on OCP within the past year for cycle regulation, fertility testings are not indicated at this time.  She has PCOS, likely ovulation is her infertility factor.  Instructed patient on timed intercourse and using ovulation kit, start PNV/folic acid/DHA.  If she is not able to conceive in one year, then return for fertility evaluation. D/C OCP now.     2.  Total time spent with patient 25 minutes, with >50% spent on counseling and coordinating of care.       "

## 2017-08-17 ENCOUNTER — TELEPHONE (OUTPATIENT)
Dept: OBSTETRICS AND GYNECOLOGY | Facility: CLINIC | Age: 22
End: 2017-08-17

## 2017-08-17 NOTE — TELEPHONE ENCOUNTER
Spoke to Tiffany at [facility] and informed her per MD any prenatal covered my the insurance company is fine. kandy

## 2017-08-17 NOTE — TELEPHONE ENCOUNTER
----- Message from Concepcion Nguyen sent at 8/17/2017  9:45 AM CDT -----  Tiffany w/[facility] calling regarding her prenatal. They are unable to order this and wants to know if the doctor wants to order something else. Please call at [phone]

## 2017-08-23 ENCOUNTER — PATIENT MESSAGE (OUTPATIENT)
Dept: FAMILY MEDICINE | Facility: CLINIC | Age: 22
End: 2017-08-23

## 2017-10-13 ENCOUNTER — OFFICE VISIT (OUTPATIENT)
Dept: OBSTETRICS AND GYNECOLOGY | Facility: CLINIC | Age: 22
End: 2017-10-13
Payer: COMMERCIAL

## 2017-10-13 ENCOUNTER — LAB VISIT (OUTPATIENT)
Dept: LAB | Facility: HOSPITAL | Age: 22
End: 2017-10-13
Attending: OBSTETRICS & GYNECOLOGY
Payer: COMMERCIAL

## 2017-10-13 VITALS
HEIGHT: 57 IN | DIASTOLIC BLOOD PRESSURE: 64 MMHG | WEIGHT: 119.06 LBS | BODY MASS INDEX: 25.68 KG/M2 | SYSTOLIC BLOOD PRESSURE: 116 MMHG

## 2017-10-13 DIAGNOSIS — Z11.3 SCREENING EXAMINATION FOR STD (SEXUALLY TRANSMITTED DISEASE): ICD-10-CM

## 2017-10-13 DIAGNOSIS — N89.8 VAGINAL DISCHARGE: ICD-10-CM

## 2017-10-13 DIAGNOSIS — Z01.419 ENCOUNTER FOR WELL WOMAN EXAM WITH ROUTINE GYNECOLOGICAL EXAM: Primary | ICD-10-CM

## 2017-10-13 DIAGNOSIS — B37.31 VULVOVAGINITIS CANDIDA ALBICANS: ICD-10-CM

## 2017-10-13 PROCEDURE — 99395 PREV VISIT EST AGE 18-39: CPT | Mod: S$GLB,,, | Performed by: OBSTETRICS & GYNECOLOGY

## 2017-10-13 PROCEDURE — 99999 PR PBB SHADOW E&M-EST. PATIENT-LVL III: CPT | Mod: PBBFAC,,, | Performed by: OBSTETRICS & GYNECOLOGY

## 2017-10-13 PROCEDURE — 87480 CANDIDA DNA DIR PROBE: CPT

## 2017-10-13 PROCEDURE — 87660 TRICHOMONAS VAGIN DIR PROBE: CPT

## 2017-10-13 PROCEDURE — 87591 N.GONORRHOEAE DNA AMP PROB: CPT

## 2017-10-13 PROCEDURE — 36415 COLL VENOUS BLD VENIPUNCTURE: CPT

## 2017-10-13 PROCEDURE — 80074 ACUTE HEPATITIS PANEL: CPT

## 2017-10-13 PROCEDURE — 86592 SYPHILIS TEST NON-TREP QUAL: CPT

## 2017-10-13 PROCEDURE — 86703 HIV-1/HIV-2 1 RESULT ANTBDY: CPT

## 2017-10-13 RX ORDER — FLUCONAZOLE 150 MG/1
150 TABLET ORAL ONCE
Qty: 1 TABLET | Refills: 0 | Status: SHIPPED | OUTPATIENT
Start: 2017-10-13 | End: 2017-10-13

## 2017-10-13 NOTE — PROGRESS NOTES
SUBJECTIVE:   Kate Bailey is a 22 y.o. female   for annual well woman exam. Patient's last menstrual period was 2017 (exact date)..    She noticed intermittent thick white discharge. + itching.  Reported h/o trichomonas in the past  Would like std testing    Desires pregnancy.  She has tried using ovulation kit which has positive result last week.  She has not tried timed intercourse yet.  Taking her PNV.    Past Medical History:   Diagnosis Date    Migraine     PCOS (polycystic ovarian syndrome)     Recurrent epistaxis      Past Surgical History:   Procedure Laterality Date    ADENOIDECTOMY      TONSILLECTOMY       Social History     Social History    Marital status: Single     Spouse name: N/A    Number of children: N/A    Years of education: N/A     Occupational History    Not on file.     Social History Main Topics    Smoking status: Never Smoker    Smokeless tobacco: Never Used    Alcohol use No    Drug use: No    Sexual activity: Yes     Partners: Male     Birth control/ protection: None     Other Topics Concern    Not on file     Social History Narrative    No narrative on file     Family History   Problem Relation Age of Onset    Cancer Paternal Grandfather      breast     Hypertension Maternal Grandmother      OB History    Para Term  AB Living   0 0 0 0 0 0   SAB TAB Ectopic Multiple Live Births   0 0 0 0           Obstetric Comments   Gynhx;  13/Every 1-3 month/7-14 days   H/o PCOS   Pap neg 2015   H/o trichomonoas         Current Outpatient Prescriptions   Medication Sig Dispense Refill    fluconazole (DIFLUCAN) 150 MG Tab Take 1 tablet (150 mg total) by mouth once. 1 tablet 0    PRENATAL 19, WITH DOCUSATE, 29 mg iron- 1 mg-25 mg Tab Take 1 tablet by mouth once daily.  11    prenatal vit 36-iron-folate 6 26 mg iron- 1 mg Tab Take 1 tablet by mouth once daily. 30 tablet 11     Current Facility-Administered Medications   Medication Dose Route  "Frequency Provider Last Rate Last Dose    hpv vaccine,9-selina Syrg 0.5 mL  0.5 mL Intramuscular Once Chanda Banuelos MD         Allergies: Review of patient's allergies indicates no known allergies.       ROS:  GENERAL: Denies weight gain or weight loss. Feeling well overall.   SKIN: Denies rash or lesions.   HEAD: Denies head injury or headache.   NODES: Denies enlarged lymph nodes.   CHEST: Denies chest pain or shortness of breath.   CARDIOVASCULAR: Denies palpitations or left sided chest pain.   ABDOMEN: No abdominal pain, constipation, diarrhea, nausea, vomiting or rectal bleeding.   URINARY: No frequency, dysuria, hematuria, or burning on urination.  REPRODUCTIVE: Denies abnormal vaginal bleeding, lesions, pelvic pain, + vaginal discharge.  BREASTS: The patient performs breast self-examination and denies pain, lumps, or nipple discharge.   HEMATOLOGIC: No easy bruisability or excessive bleeding.  MUSCULOSKELETAL: Denies joint pain or swelling.   NEUROLOGIC: Denies syncope or weakness.   PSYCHIATRIC: Denies depression, anxiety or mood swings.      OBJECTIVE:   /64   Ht 4' 9" (1.448 m)   Wt 54 kg (119 lb 0.8 oz)   LMP 08/24/2017 (Exact Date)   BMI 25.76 kg/m²   The patient appears well, alert, oriented x 3, in no distress.  NECK: negative, no thyromegaly, trachea midline  SKIN: normal, good color, good turgor and no acne, striae, hirsutism  BREAST EXAM: breasts appear normal, no suspicious masses, no skin or nipple changes or axillary nodes  ABDOMEN: soft, non-tender; bowel sounds normal; no masses,  no organomegaly and no hernias, masses, or hepatosplenomegaly  GENITALIA: normal external genitalia, no erythema, no discharge  URETHRA: normal appearing urethra with no masses, tenderness or lesions and normal urethra, normal urethral meatus  VAGINA: vaginal discharge thick, curd-like and odorless  CERVIX: no lesions or cervical motion tenderness  UTERUS: normal size, contour, position, consistency, mobility, " non-tender  ADNEXA: no mass, fullness, tenderness      ASSESSMENT:   1. Health maintenance  -pap neg 2015, next pap 2018  -counseled on exercise and healthy diet  -bone health:  Discussed Vitamin D and Calcium supplementation  -gardasil today, 2nd injection  -Discussed safety at home/school/work, healthy and balanced diet, sleep hygiene, as well as violence/weapons exposure  -std panel  2. Desires future fertility: OPK, timed intercourse and continue PNV.  3. [facility]:  rx for diflucan. Affirm sent  4.  RTC in on year for wwe or when pregnant

## 2017-10-15 LAB
C TRACH DNA SPEC QL NAA+PROBE: NOT DETECTED
N GONORRHOEA DNA SPEC QL NAA+PROBE: NOT DETECTED

## 2017-10-16 LAB
CANDIDA RRNA VAG QL PROBE: POSITIVE
G VAGINALIS RRNA GENITAL QL PROBE: POSITIVE
HAV IGM SERPL QL IA: NEGATIVE
HBV CORE IGM SERPL QL IA: NEGATIVE
HBV SURFACE AG SERPL QL IA: NEGATIVE
HCV AB SERPL QL IA: NEGATIVE
HIV 1+2 AB+HIV1 P24 AG SERPL QL IA: NEGATIVE
RPR SER QL: NORMAL
T VAGINALIS RRNA GENITAL QL PROBE: NEGATIVE

## 2017-10-17 ENCOUNTER — PATIENT MESSAGE (OUTPATIENT)
Dept: OBSTETRICS AND GYNECOLOGY | Facility: CLINIC | Age: 22
End: 2017-10-17

## 2017-10-18 ENCOUNTER — TELEPHONE (OUTPATIENT)
Dept: OBSTETRICS AND GYNECOLOGY | Facility: CLINIC | Age: 22
End: 2017-10-18

## 2017-10-18 RX ORDER — METRONIDAZOLE 500 MG/1
500 TABLET ORAL EVERY 12 HOURS
Qty: 14 TABLET | Refills: 0 | Status: SHIPPED | OUTPATIENT
Start: 2017-10-18 | End: 2017-10-25

## 2017-10-18 NOTE — TELEPHONE ENCOUNTER
----- Message from Chanda Banuelos MD sent at 10/18/2017 10:35 AM CDT -----  Please inform pt that she has Bv.  Prescription for flagyl sent to her pharmacy.

## 2017-11-06 ENCOUNTER — PATIENT MESSAGE (OUTPATIENT)
Dept: OBSTETRICS AND GYNECOLOGY | Facility: CLINIC | Age: 22
End: 2017-11-06

## 2017-11-21 ENCOUNTER — OFFICE VISIT (OUTPATIENT)
Dept: OBSTETRICS AND GYNECOLOGY | Facility: CLINIC | Age: 22
End: 2017-11-21
Payer: COMMERCIAL

## 2017-11-21 VITALS
DIASTOLIC BLOOD PRESSURE: 60 MMHG | BODY MASS INDEX: 25.87 KG/M2 | HEIGHT: 57 IN | SYSTOLIC BLOOD PRESSURE: 110 MMHG | WEIGHT: 119.94 LBS

## 2017-11-21 DIAGNOSIS — B96.89 BV (BACTERIAL VAGINOSIS): Primary | ICD-10-CM

## 2017-11-21 DIAGNOSIS — N76.0 BV (BACTERIAL VAGINOSIS): Primary | ICD-10-CM

## 2017-11-21 PROCEDURE — 87480 CANDIDA DNA DIR PROBE: CPT

## 2017-11-21 PROCEDURE — 99999 PR PBB SHADOW E&M-EST. PATIENT-LVL III: CPT | Mod: PBBFAC,,, | Performed by: OBSTETRICS & GYNECOLOGY

## 2017-11-21 PROCEDURE — 87660 TRICHOMONAS VAGIN DIR PROBE: CPT

## 2017-11-21 PROCEDURE — 99214 OFFICE O/P EST MOD 30 MIN: CPT | Mod: S$GLB,,, | Performed by: OBSTETRICS & GYNECOLOGY

## 2017-11-21 RX ORDER — METRONIDAZOLE 7.5 MG/G
GEL VAGINAL
Qty: 25 G | Refills: 1 | Status: SHIPPED | OUTPATIENT
Start: 2017-11-21 | End: 2017-12-21 | Stop reason: SDUPTHER

## 2017-11-21 NOTE — PROGRESS NOTES
SUBJECTIVE:   22 y.o. female   for vaginal discharge.    Patient's last menstrual period was 2017 (exact date)..      Discharge onset a few days ago, took OTC antifungal and thinks it got worsen  Thinks she has Bv, fishy odor discharge.  Denies pruritis.      OB History    Para Term  AB Living   0 0 0 0 0 0   SAB TAB Ectopic Multiple Live Births   0 0 0 0           Obstetric Comments   Gynhx;  13/Every 1-3 month/7-14 days   H/o PCOS   Pap neg 2015   H/o trichomonas     Past Medical History:   Diagnosis Date    Migraine     PCOS (polycystic ovarian syndrome)     Recurrent epistaxis      Past Surgical History:   Procedure Laterality Date    ADENOIDECTOMY      TONSILLECTOMY       Social History     Social History    Marital status: Single     Spouse name: N/A    Number of children: N/A    Years of education: N/A     Occupational History    Not on file.     Social History Main Topics    Smoking status: Never Smoker    Smokeless tobacco: Never Used    Alcohol use No    Drug use: No    Sexual activity: Yes     Partners: Male     Birth control/ protection: None     Other Topics Concern    Not on file     Social History Narrative    No narrative on file     Family History   Problem Relation Age of Onset    Cancer Paternal Grandfather      breast     Hypertension Maternal Grandmother          Current Outpatient Prescriptions   Medication Sig Dispense Refill    metroNIDAZOLE (METROGEL) 0.75 % vaginal gel One full applicator (5g) per vagina once a day x 5 days 25 g 1    PRENATAL 19, WITH DOCUSATE, 29 mg iron- 1 mg-25 mg Tab Take 1 tablet by mouth once daily.  11    prenatal vit 36-iron-folate 6 26 mg iron- 1 mg Tab Take 1 tablet by mouth once daily. 30 tablet 11     No current facility-administered medications for this visit.      Allergies: Patient has no known allergies.       ROS:  GENERAL: Denies weight gain or weight loss. Feeling well overall.   SKIN: Denies rash or  "lesions.   HEAD: Denies head injury or headache.   NODES: Denies enlarged lymph nodes.   CHEST: Denies chest pain or shortness of breath.   CARDIOVASCULAR: Denies palpitations or left sided chest pain.   ABDOMEN: No abdominal pain, constipation, diarrhea, nausea, vomiting or rectal bleeding.   URINARY: No frequency, dysuria, hematuria, or burning on urination.  REPRODUCTIVE: Denies abnormal vaginal bleeding, lesions, pelvic pain. + vaginal discharge  BREASTS: The patient performs breast self-examination and denies pain, lumps, or nipple discharge.   HEMATOLOGIC: No easy bruisability or excessive bleeding.  MUSCULOSKELETAL: Denies joint pain or swelling.   NEUROLOGIC: Denies syncope or weakness.   PSYCHIATRIC: Denies depression, anxiety or mood swings.    OBJECTIVE:   /60   Ht 4' 9" (1.448 m)   Wt 54.4 kg (119 lb 14.9 oz)   LMP 08/23/2017 (Exact Date)   BMI 25.95 kg/m²   The patient appears well, alert, oriented x 3, in no distress.  NECK: negative, no thyromegaly, trachea midline  SKIN: normal and no acne, striae, hirsutism  BREAST EXAM: deferred  ABDOMEN: soft, non-tender; bowel sounds normal; no masses,  no organomegaly and no hernias, masses, or hepatosplenomegaly  GENITALIA: normal external genitalia, no erythema, no discharge  URETHRA: normal appearing urethra with no masses, tenderness or lesions and normal urethra, normal urethral meatus  VAGINA: vaginal discharge frothy and malodorous  CERVIX: no lesions or cervical motion tenderness  UTERUS: normal size, contour, position, consistency, mobility, non-tender  ADNEXA: no mass, fullness, tenderness      ASSESSMENT:   1.  Bv:  rx for flagyl gel with one refill. Affirm sent.  GC/CT negative one month ago.  2.  rtc if symptoms not improving or worsen  "

## 2017-11-22 LAB
CANDIDA RRNA VAG QL PROBE: NEGATIVE
G VAGINALIS RRNA GENITAL QL PROBE: POSITIVE
T VAGINALIS RRNA GENITAL QL PROBE: NEGATIVE

## 2017-12-21 RX ORDER — METRONIDAZOLE 7.5 MG/G
GEL VAGINAL
Qty: 25 G | Refills: 1 | Status: SHIPPED | OUTPATIENT
Start: 2017-12-21 | End: 2018-01-28

## 2018-01-28 ENCOUNTER — HOSPITAL ENCOUNTER (EMERGENCY)
Facility: HOSPITAL | Age: 23
Discharge: HOME | End: 2018-01-28
Attending: EMERGENCY MEDICINE
Payer: COMMERCIAL

## 2018-01-28 VITALS
HEART RATE: 67 BPM | RESPIRATION RATE: 18 BRPM | WEIGHT: 117 LBS | DIASTOLIC BLOOD PRESSURE: 69 MMHG | TEMPERATURE: 98 F | SYSTOLIC BLOOD PRESSURE: 108 MMHG | OXYGEN SATURATION: 99 % | BODY MASS INDEX: 21.53 KG/M2 | HEIGHT: 62 IN

## 2018-01-28 DIAGNOSIS — R21 RASH OF NECK: Primary | ICD-10-CM

## 2018-01-28 LAB
B-HCG UR QL: NEGATIVE
CTP QC/QA: YES

## 2018-01-28 PROCEDURE — 81025 URINE PREGNANCY TEST: CPT | Performed by: PHYSICIAN ASSISTANT

## 2018-01-28 PROCEDURE — 99284 EMERGENCY DEPT VISIT MOD MDM: CPT | Mod: 25

## 2018-01-28 PROCEDURE — 63600175 PHARM REV CODE 636 W HCPCS: Performed by: PHYSICIAN ASSISTANT

## 2018-01-28 PROCEDURE — 96372 THER/PROPH/DIAG INJ SC/IM: CPT

## 2018-01-28 RX ORDER — DIPHENHYDRAMINE HYDROCHLORIDE 50 MG/ML
25 INJECTION INTRAMUSCULAR; INTRAVENOUS
Status: COMPLETED | OUTPATIENT
Start: 2018-01-28 | End: 2018-01-28

## 2018-01-28 RX ORDER — DIPHENHYDRAMINE HCL 25 MG
25 CAPSULE ORAL EVERY 6 HOURS PRN
COMMUNITY
End: 2018-01-28 | Stop reason: HOSPADM

## 2018-01-28 RX ORDER — ACETAMINOPHEN 500 MG
500 TABLET ORAL EVERY 4 HOURS PRN
Qty: 20 TABLET | Refills: 0 | Status: SHIPPED | OUTPATIENT
Start: 2018-01-28 | End: 2018-02-02

## 2018-01-28 RX ORDER — DIPHENHYDRAMINE HCL 25 MG
25 CAPSULE ORAL EVERY 6 HOURS PRN
Qty: 15 CAPSULE | Refills: 0 | Status: SHIPPED | OUTPATIENT
Start: 2018-01-28 | End: 2018-02-02

## 2018-01-28 RX ORDER — DEXAMETHASONE SODIUM PHOSPHATE 4 MG/ML
8 INJECTION, SOLUTION INTRA-ARTICULAR; INTRALESIONAL; INTRAMUSCULAR; INTRAVENOUS; SOFT TISSUE
Status: COMPLETED | OUTPATIENT
Start: 2018-01-28 | End: 2018-01-28

## 2018-01-28 RX ORDER — KETOROLAC TROMETHAMINE 30 MG/ML
15 INJECTION, SOLUTION INTRAMUSCULAR; INTRAVENOUS
Status: COMPLETED | OUTPATIENT
Start: 2018-01-28 | End: 2018-01-28

## 2018-01-28 RX ORDER — ETODOLAC 200 MG/1
200 CAPSULE ORAL 3 TIMES DAILY PRN
Qty: 20 CAPSULE | Refills: 0 | Status: SHIPPED | OUTPATIENT
Start: 2018-01-28 | End: 2018-02-04

## 2018-01-28 RX ADMIN — DEXAMETHASONE SODIUM PHOSPHATE 8 MG: 4 INJECTION, SOLUTION INTRAMUSCULAR; INTRAVENOUS at 10:01

## 2018-01-28 RX ADMIN — KETOROLAC TROMETHAMINE 15 MG: 30 INJECTION, SOLUTION INTRAMUSCULAR at 10:01

## 2018-01-28 RX ADMIN — DIPHENHYDRAMINE HYDROCHLORIDE 25 MG: 50 INJECTION, SOLUTION INTRAMUSCULAR; INTRAVENOUS at 10:01

## 2018-01-28 NOTE — ED PROVIDER NOTES
"Encounter Date: 1/28/2018    SCRIBE #1 NOTE: I, Greta Goldman, am scribing for, and in the presence of, Radha Jade PA-C. Other sections scribed: HPI/ROS.       History     Chief Complaint   Patient presents with    Rash     rash to neck and face started yesterday     CC: Rash    Pt is a 22 y.o. female w/ presenting to the ED c/o worsening, "stinging" rash to the anterior neck x 2 days. Pt took 2 Benadryl yesterday with no relief. Pt has never experienced this rash before. Pt also states, "my eyes got puffy overnight, but they're getting better," and pt denies blurred vision, eye pain, or eye discharge. Pt reports she started taking One-A-Day vitamins last Saturday (1/20/18).      Pt denies new food, soap, or detergents, pruritus, SOB, wheezing, dysphagia, N/V/abdominal px, or sore throat. Pt reports no further symptoms.      The history is provided by the patient.     Review of patient's allergies indicates:  No Known Allergies  Past Medical History:   Diagnosis Date    Migraine     PCOS (polycystic ovarian syndrome)     Recurrent epistaxis      Past Surgical History:   Procedure Laterality Date    ADENOIDECTOMY      TONSILLECTOMY       Family History   Problem Relation Age of Onset    Cancer Paternal Grandfather      breast     Hypertension Maternal Grandmother      Social History   Substance Use Topics    Smoking status: Never Smoker    Smokeless tobacco: Never Used    Alcohol use No     Review of Systems   HENT: Negative for sore throat, trouble swallowing and voice change.    Eyes: Negative for pain, discharge and itching.   Respiratory: Negative for shortness of breath and wheezing.    Gastrointestinal: Negative for abdominal pain, nausea and vomiting.   Skin: Positive for rash.       Physical Exam     Initial Vitals [01/28/18 0929]   BP Pulse Resp Temp SpO2   (!) 109/59 68 20 98.5 °F (36.9 °C) 98 %      MAP       75.67         Physical Exam    Nursing note and vitals reviewed.  Constitutional: " She appears well-developed and well-nourished. No distress.   HENT:   Head: Normocephalic.   Right Ear: Hearing and external ear normal.   Left Ear: Hearing and external ear normal.   Mouth/Throat: Uvula is midline, oropharynx is clear and moist and mucous membranes are normal. No oropharyngeal exudate, posterior oropharyngeal edema or posterior oropharyngeal erythema.   Eyes: Conjunctivae, EOM and lids are normal. Pupils are equal, round, and reactive to light. Right eye exhibits no discharge. Left eye exhibits no discharge. Right conjunctiva is not injected. Left conjunctiva is not injected.   Neck: Normal range of motion.   Cardiovascular: Normal rate and regular rhythm. Exam reveals no gallop and no friction rub.    No murmur heard.  Pulmonary/Chest: Breath sounds normal. No respiratory distress. She has no wheezes. She has no rhonchi. She has no rales.   Abdominal: Soft. Bowel sounds are normal. She exhibits no distension. There is no tenderness. There is no rebound and no guarding.   Musculoskeletal: Normal range of motion.   Neurological: She is alert.   Skin: Skin is warm and dry. Rash noted.   Erythematous rash to neck with mild TTP    Psychiatric: She has a normal mood and affect.         ED Course   Procedures  Labs Reviewed   POCT URINE PREGNANCY             Medical Decision Making:   Initial Assessment:   Patient is a 22-year-old female with one-day history of erythematous and painful rash to her neck.  Patient reports she had similar rash and swelling to has this morning that has resolved.  Patient denies any medications, soaps, lotions or detergents or contact with anything that may have caused symptoms.  She is afebrile, well-appearing in no distress.  Exam findings as above.  Considered but doubt anaphylaxis, SJS, TENS, necrotizing fascitis.  Patient given Toradol, Decadron and Benadryl DENIZ.  Discharge home in stable condition with meclizine, Tylenol and Benadryl for symptomatic treatment.  PCP  follow-up in 2 days . Allergy/Immunology follow up if symptoms persist.  ER return precautions discussed including worsening symptoms, new symptoms, difficulty breathing or swallowing or as needed.  I discussed this patient with Dr. Cooper who agrees with assessment and plan.            Scribe Attestation:   Scribe #1: I performed the above scribed service and the documentation accurately describes the services I performed. I attest to the accuracy of the note.    Attending Attestation:           Physician Attestation for Scribe:  Physician Attestation Statement for Scribe #1: I, Radha Jade PA-C, reviewed documentation, as scribed by Greta Goldman in my presence, and it is both accurate and complete.                 ED Course      Clinical Impression:   The encounter diagnosis was Rash of neck.                           Radha Jade PA-C  01/28/18 7115

## 2018-01-28 NOTE — DISCHARGE INSTRUCTIONS
Take medications as prescribed.     Follow up with primary care in 2 days and Allergy/Immunology if symptoms persist.     Return to ER for worsening symptoms, difficulty breathing or swallowing or as needed.

## 2018-01-30 ENCOUNTER — PATIENT MESSAGE (OUTPATIENT)
Dept: FAMILY MEDICINE | Facility: CLINIC | Age: 23
End: 2018-01-30

## 2018-05-07 ENCOUNTER — PATIENT MESSAGE (OUTPATIENT)
Dept: OBSTETRICS AND GYNECOLOGY | Facility: CLINIC | Age: 23
End: 2018-05-07

## 2018-05-07 ENCOUNTER — PATIENT MESSAGE (OUTPATIENT)
Dept: FAMILY MEDICINE | Facility: CLINIC | Age: 23
End: 2018-05-07

## 2018-05-10 ENCOUNTER — OFFICE VISIT (OUTPATIENT)
Dept: FAMILY MEDICINE | Facility: CLINIC | Age: 23
End: 2018-05-10
Payer: COMMERCIAL

## 2018-05-10 VITALS
HEART RATE: 72 BPM | TEMPERATURE: 98 F | RESPIRATION RATE: 16 BRPM | DIASTOLIC BLOOD PRESSURE: 72 MMHG | WEIGHT: 125.25 LBS | HEIGHT: 62 IN | OXYGEN SATURATION: 99 % | BODY MASS INDEX: 23.05 KG/M2 | SYSTOLIC BLOOD PRESSURE: 106 MMHG

## 2018-05-10 DIAGNOSIS — N76.0 ACUTE VAGINITIS: Primary | ICD-10-CM

## 2018-05-10 PROCEDURE — 99999 PR PBB SHADOW E&M-EST. PATIENT-LVL III: CPT | Mod: PBBFAC,,, | Performed by: PHYSICIAN ASSISTANT

## 2018-05-10 PROCEDURE — 3008F BODY MASS INDEX DOCD: CPT | Mod: CPTII,S$GLB,, | Performed by: PHYSICIAN ASSISTANT

## 2018-05-10 PROCEDURE — 99213 OFFICE O/P EST LOW 20 MIN: CPT | Mod: S$GLB,,, | Performed by: PHYSICIAN ASSISTANT

## 2018-05-10 RX ORDER — METRONIDAZOLE 500 MG/1
500 TABLET ORAL EVERY 12 HOURS
Qty: 14 TABLET | Refills: 0 | Status: SHIPPED | OUTPATIENT
Start: 2018-05-10 | End: 2018-05-17

## 2018-05-10 RX ORDER — FLUCONAZOLE 150 MG/1
150 TABLET ORAL ONCE
Qty: 1 TABLET | Refills: 0 | Status: SHIPPED | OUTPATIENT
Start: 2018-05-10 | End: 2018-05-10

## 2018-05-10 NOTE — PROGRESS NOTES
Subjective:       Patient ID: Kate Bailey is a 23 y.o. female.    Chief Complaint: Vaginal Discharge (unusual,watery) and Oligomenorrhea (irregular,last cycle 4/23/2018 thru 5/4/2018)    Vaginal Discharge   The patient's primary symptoms include genital itching, vaginal bleeding and vaginal discharge. The patient's pertinent negatives include no genital odor. This is a new problem. The current episode started more than 1 month ago. The problem occurs constantly. The problem has been unchanged. The pain is mild. Pertinent negatives include no dysuria or painful intercourse. The vaginal discharge was white and thick. Nothing aggravates the symptoms. Treatments tried: flagyl. She is sexually active. No, her partner does not have an STD. Her past medical history is significant for vaginosis.     Review of Systems   Genitourinary: Positive for vaginal discharge. Negative for dysuria.       Objective:      Physical Exam   Constitutional: She is oriented to person, place, and time. She appears well-developed and well-nourished. No distress.   HENT:   Head: Normocephalic and atraumatic.   Neurological: She is alert and oriented to person, place, and time.   Skin: Skin is warm and dry. She is not diaphoretic.   Psychiatric: She has a normal mood and affect. Her behavior is normal.   Vitals reviewed.      Assessment:       1. Acute vaginitis        Plan:         Kate was seen today for vaginal discharge and oligomenorrhea.    Diagnoses and all orders for this visit:    Acute vaginitis  -     fluconazole (DIFLUCAN) 150 MG Tab; Take 1 tablet (150 mg total) by mouth once.  -     metroNIDAZOLE (FLAGYL) 500 MG tablet; Take 1 tablet (500 mg total) by mouth every 12 (twelve) hours.  -     Pt unable to do vaginal swab today due to having another appt, advised if no change will need to come in for swab

## 2018-09-10 ENCOUNTER — OFFICE VISIT (OUTPATIENT)
Dept: OBSTETRICS AND GYNECOLOGY | Facility: CLINIC | Age: 23
End: 2018-09-10
Payer: COMMERCIAL

## 2018-09-10 ENCOUNTER — LAB VISIT (OUTPATIENT)
Dept: LAB | Facility: HOSPITAL | Age: 23
End: 2018-09-10
Attending: OBSTETRICS & GYNECOLOGY
Payer: COMMERCIAL

## 2018-09-10 VITALS
BODY MASS INDEX: 23.32 KG/M2 | WEIGHT: 126.69 LBS | HEIGHT: 62 IN | DIASTOLIC BLOOD PRESSURE: 59 MMHG | SYSTOLIC BLOOD PRESSURE: 107 MMHG

## 2018-09-10 DIAGNOSIS — Z30.09 FAMILY PLANNING ADVICE: ICD-10-CM

## 2018-09-10 DIAGNOSIS — N89.8 VAGINAL DISCHARGE: ICD-10-CM

## 2018-09-10 DIAGNOSIS — Z01.419 WELL WOMAN EXAM WITH ROUTINE GYNECOLOGICAL EXAM: Primary | ICD-10-CM

## 2018-09-10 DIAGNOSIS — Z01.419 WELL WOMAN EXAM WITH ROUTINE GYNECOLOGICAL EXAM: ICD-10-CM

## 2018-09-10 DIAGNOSIS — Z87.42 HISTORY OF PCOS: ICD-10-CM

## 2018-09-10 LAB
B-HCG UR QL: NEGATIVE
CTP QC/QA: YES

## 2018-09-10 PROCEDURE — 99212 OFFICE O/P EST SF 10 MIN: CPT | Mod: 25,S$GLB,, | Performed by: OBSTETRICS & GYNECOLOGY

## 2018-09-10 PROCEDURE — 81025 URINE PREGNANCY TEST: CPT | Mod: S$GLB,,, | Performed by: OBSTETRICS & GYNECOLOGY

## 2018-09-10 PROCEDURE — 86592 SYPHILIS TEST NON-TREP QUAL: CPT

## 2018-09-10 PROCEDURE — 36415 COLL VENOUS BLD VENIPUNCTURE: CPT

## 2018-09-10 PROCEDURE — 3008F BODY MASS INDEX DOCD: CPT | Mod: CPTII,S$GLB,, | Performed by: OBSTETRICS & GYNECOLOGY

## 2018-09-10 PROCEDURE — 99999 PR PBB SHADOW E&M-EST. PATIENT-LVL III: CPT | Mod: PBBFAC,,, | Performed by: OBSTETRICS & GYNECOLOGY

## 2018-09-10 PROCEDURE — 88175 CYTOPATH C/V AUTO FLUID REDO: CPT

## 2018-09-10 PROCEDURE — 99395 PREV VISIT EST AGE 18-39: CPT | Mod: S$GLB,,, | Performed by: OBSTETRICS & GYNECOLOGY

## 2018-09-10 PROCEDURE — 87491 CHLMYD TRACH DNA AMP PROBE: CPT

## 2018-09-10 PROCEDURE — 87660 TRICHOMONAS VAGIN DIR PROBE: CPT

## 2018-09-10 PROCEDURE — 86703 HIV-1/HIV-2 1 RESULT ANTBDY: CPT

## 2018-09-10 NOTE — PROGRESS NOTES
"History & Physical  Gynecology      SUBJECTIVE:     Chief Complaint: Vaginal Discharge       History of Present Illness:  Annual Exam-Premenopausal  Ms. Bailey is 23 year old G0 who is new to me and presents for annual exam. The patient complains today of abnormal vaginal discharge with foul smelling odor. She reports that she has been diagnosed multiple times in the past with BV. She has a history of Chlamydia diagnosed in  and . She reports that she tried "the pills" for BV with no resolution of symptoms. She was then given Metrogel but used it inconsistently. She believes that the BV was never treated so this is not a new infection. She denies douching and using fragrance on or in her vaginal. She is currently sexually active with one partner but would like STD testing. She declines contraception as she would like to get pregnant soon. She is currently taking PNV. She reports that the last 3 months her periods have been consistent but it has not come yet this month. Her LMP 2018. A home pregnancy test was negative. Her last pap smear was 2015 and was negative. She is s/p Gardasil vaccine.    Of note, the patient is s/p egg donation. 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 She denies tobacco use and reports that she feels safe at home.     Review of patient's allergies indicates:  No Known Allergies    Past Medical History:   Diagnosis Date    Migraine     PCOS (polycystic ovarian syndrome)     Recurrent epistaxis      Past Surgical History:   Procedure Laterality Date    ADENOIDECTOMY      got eggs frozen Bilateral 2018    TONSILLECTOMY       OB History      Para Term  AB Living    0 0 0 0 0 0    SAB TAB Ectopic Multiple Live Births    0 0 0 0          Obstetric Comments    Gynhx;  13/Every 1-3 month/7-14 days  H/o PCOS  Pap neg 2015  H/o trichomonas        Family History   Problem Relation Age of Onset    Cancer " Paternal Grandfather         breast     Hypertension Maternal Grandmother      Social History     Tobacco Use    Smoking status: Never Smoker    Smokeless tobacco: Never Used   Substance Use Topics    Alcohol use: No    Drug use: No       No current outpatient medications on file.     No current facility-administered medications for this visit.          Review of Systems:  Review of Systems   Constitutional: Negative for appetite change, chills, fever and unexpected weight change.   Eyes: Negative for visual disturbance.   Respiratory: Negative for cough and wheezing.    Cardiovascular: Negative for chest pain and palpitations.   Gastrointestinal: Negative for abdominal pain, diarrhea, nausea and vomiting.   Endocrine: Negative for diabetes.   Genitourinary: Positive for menstrual problem, vaginal discharge and vaginal odor. Negative for dysuria, frequency, hematuria, pelvic pain, vaginal bleeding and vaginal pain.   Neurological: Negative for headaches.   Psychiatric/Behavioral: Negative for depression. The patient is not nervous/anxious.         OBJECTIVE:     Physical Exam:  Physical Exam   Constitutional: She is oriented to person, place, and time. She appears well-developed and well-nourished.   Cardiovascular: Normal rate.   Pulmonary/Chest: Effort normal. No respiratory distress. Breasts are symmetrical. There is no breast swelling.   Abdominal: Soft. Bowel sounds are normal. She exhibits no distension. There is no tenderness.   Genitourinary: Rectum normal and uterus normal. No breast tenderness, discharge or bleeding. Pelvic exam was performed with patient supine. There is no lesion on the right labia. There is no lesion on the left labia. Cervix exhibits no motion tenderness and no friability. Right adnexum displays no tenderness. Left adnexum displays no tenderness. No tenderness or bleeding in the vagina. Vaginal discharge found.   Genitourinary Comments: Uterus 7 week size deviated to the left,  thick white discharge in vaginal vault.    Neurological: She is alert and oriented to person, place, and time.   Skin: Skin is warm and dry.   Psychiatric: She has a normal mood and affect.   Vitals reviewed.    ASSESSMENT:       ICD-10-CM ICD-9-CM    1. Well woman exam with routine gynecological exam Z01.419 V72.31 Liquid-based pap smear, screening      POCT urine pregnancy      HIV-1 and HIV-2 antibodies      RPR   2. Vaginal discharge N89.8 623.5 C. trachomatis/N. gonorrhoeae by AMP DNA      Vaginosis Screen by DNA Probe      POCT urine pregnancy   3. History of PCOS Z87.42 V13.29           Plan:      Kate was seen today for vaginal discharge.    Diagnoses and all orders for this visit:    Well woman exam with routine gynecological exam  -     Liquid-based pap smear, screening  -     POCT urine pregnancy  -     HIV-1 and HIV-2 antibodies; Future  -     RPR; Future  -  S/p Gardasil vaccine    Vaginal discharge  -     C. trachomatis/N. gonorrhoeae by AMP DNA  -     Vaginosis Screen by DNA Probe  -     POCT urine pregnancy  - Metrogel and/or diflucan to be given pending results  - Chronic BV per chart. If BV is diagnosed and Metrogel does not work, will move to boric acid.     History of PCOS  - Discusses [place];'s criteria with patient with her 2/3 being polycystic ovaries on TVUS in 2015 and oligomenorrhea. Currently with normal periods over the last 3 months s/p egg donation. LMP 08/03/2018.   - Plan to start tracking menses more closely to highlight inconsistencies in menses. If oligomenorrhea, will start provera.      Family Planning  - Encouraged patient to continue prenatal vitamins  - No contraception at this time as she would like to get pregnant soon        Orders Placed This Encounter   Procedures    C. trachomatis/N. gonorrhoeae by AMP DNA    Vaginosis Screen by DNA Probe    HIV-1 and HIV-2 antibodies    RPR    POCT urine pregnancy       Follow-up in about 2 months (around 11/10/2018) for follow  up irregular periods.    Counseling time: 20 MIN    Chevy Noguera

## 2018-09-10 NOTE — PATIENT INSTRUCTIONS
Polycystic Ovary Syndrome  Polycystic ovary syndrome (PCOS) causes harmless, small cysts in the ovaries and other symptoms. PCOS is caused by certain hormones being out of balance. The word syndrome means a group of symptoms. Women with PCOS may have no periods, irregular periods, or very long periods.    Your ovaries  Women store their eggs in their ovaries. Each egg is in a capsule called a follicle. Normally during the reproductive years, one follicle grows to produce a mature egg each month. This egg is released during ovulation and the follicle dissolves.  Hormones out of balance  With polycystic ovary syndrome (PCOS), the hormones that control ovulation are out of balance. These include estrogen, progesterones, and androgen. As a result, ovulation may not occur. Instead, the follicle stays enlarged. This is a fluid-filled sac (cyst). Over time, the ovaries fill with many small cysts. This is why they are called poly (many) cystic ovaries. In some women, the ovaries also make too much androgen.  PCOS symptoms  Women with PCOS may also have one or more of these symptoms:  · Trouble getting pregnant (fertility problems)  · Weight gain, especially around the waist   · Acne  · Hair growth on the face and other parts of the body  · Patches of thickened, velvety, darkened skin called acanthosis nigricans  Women with PCOS are also at increased risk of developing cancer of the uterine lining, diabetes, and heart disease.   Date Last Reviewed: 5/10/2015  © 4061-6512 [employer]. [address] [place] [address]. All rights reserved. This information is not intended as a substitute for professional medical care. Always follow your healthcare professional's instructions.        Bacterial Vaginosis    You have a vaginal infection called bacterial vaginosis (BV). Both good and bad bacteria are present in a healthy vagina. BV occurs when these bacteria get out of balance. The number of bad  bacteria increase. And the number of good bacteria decrease.  BV may or may not cause symptoms. If symptoms do occur, they can include:  · Thin, gray, milky-white, or sometimes green discharge  · Unpleasant odor or fishy smell  · Itching, burning, or pain in or around the vagina  It is not known what causes BV, but certain factors can make the problem more likely. This can include:  · Douching  · Having sex with a new partner  · Having sex with more than one partner  BV will sometimes go away on its own. But treatment is usually recommended. This is because untreated BV can increase the risk of more serious health problems such as:  · Pelvic inflammatory disease (PID)  ·  delivery (giving birth to a baby early if youre pregnant)  · HIV and certain other sexually transmitted diseases (STDs)  · Infection after surgery on the reproductive organs  Home care  General care  · BV is most often treated with medicines called antibiotics. These may be given as pills or as a vaginal cream. If antibiotics are prescribed, be sure to use them exactly as directed. Also, be sure to complete all of the medicine, even if your symptoms go away.  · Avoid douching or having sex during treatment.  · If you have sex with a female partner, ask your healthcare provider if she should also be treated.  Prevention  · Limit or avoid douching.  · Avoid having sex. If you do have sex, then take steps to lower your risk:  ¨ Use condoms when having sex.  ¨ Limit the number of partners you have sex with.  Follow-up care  Follow up with your healthcare provider, or as advised.  When to seek medical advice  Call your healthcare provider right away if:  · You have a fever of 100.4ºF (38ºC) or higher, or as directed by your provider.  · Your symptoms worsen, or they dont go away within a few days of starting treatment.  · You have new pain in the lower belly or pelvic region.  · You have side effects that bother you or a reaction to the pills  or cream youre prescribed.  · You or any partners you have sex with have new symptoms, such as a rash, joint pain, or sores.  Date Last Reviewed: 7/30/2015  © 9775-6888 [employer]. [address] [place] [address], [place] [address]. All rights reserved. This information is not intended as a substitute for professional medical care. Always follow your healthcare professional's instructions.

## 2018-09-11 ENCOUNTER — PATIENT MESSAGE (OUTPATIENT)
Dept: OBSTETRICS AND GYNECOLOGY | Facility: CLINIC | Age: 23
End: 2018-09-11

## 2018-09-11 LAB
C TRACH DNA SPEC QL NAA+PROBE: NOT DETECTED
CANDIDA RRNA VAG QL PROBE: NEGATIVE
G VAGINALIS RRNA GENITAL QL PROBE: POSITIVE
HIV 1+2 AB+HIV1 P24 AG SERPL QL IA: NEGATIVE
N GONORRHOEA DNA SPEC QL NAA+PROBE: NOT DETECTED
RPR SER QL: NORMAL
T VAGINALIS RRNA GENITAL QL PROBE: NEGATIVE

## 2018-09-12 ENCOUNTER — PATIENT MESSAGE (OUTPATIENT)
Dept: OBSTETRICS AND GYNECOLOGY | Facility: CLINIC | Age: 23
End: 2018-09-12

## 2018-09-13 ENCOUNTER — TELEPHONE (OUTPATIENT)
Dept: OBSTETRICS AND GYNECOLOGY | Facility: CLINIC | Age: 23
End: 2018-09-13

## 2018-09-13 ENCOUNTER — PATIENT MESSAGE (OUTPATIENT)
Dept: OBSTETRICS AND GYNECOLOGY | Facility: CLINIC | Age: 23
End: 2018-09-13

## 2018-09-13 DIAGNOSIS — N76.0 BACTERIAL VAGINOSIS: Primary | ICD-10-CM

## 2018-09-13 DIAGNOSIS — B96.89 BACTERIAL VAGINOSIS: Primary | ICD-10-CM

## 2018-09-13 RX ORDER — METRONIDAZOLE 7.5 MG/G
1 GEL VAGINAL NIGHTLY
Qty: 70 G | Refills: 0 | Status: SHIPPED | OUTPATIENT
Start: 2018-09-13 | End: 2018-09-18

## 2018-09-13 NOTE — TELEPHONE ENCOUNTER
----- Message from Marybel Pierson sent at 9/11/2018  9:51 AM CDT -----  Contact: Kate [phone]  Patient is calling to get her results, as well as have medication sent to the pharmacy for her. Please call at your earliest convenience.

## 2018-09-13 NOTE — TELEPHONE ENCOUNTER
----- Message from Marybel Pierson sent at 9/13/2018 11:50 AM CDT -----  Contact: Kate [phone]  Patient is calling to notify the staff that her medication has not been sent to the pharmacy. Please call at your earliest convenience.

## 2018-09-18 ENCOUNTER — TELEPHONE (OUTPATIENT)
Dept: OBSTETRICS AND GYNECOLOGY | Facility: CLINIC | Age: 23
End: 2018-09-18

## 2018-09-18 DIAGNOSIS — N89.8 VAGINAL IRRITATION: Primary | ICD-10-CM

## 2018-09-18 RX ORDER — FLUCONAZOLE 150 MG/1
150 TABLET ORAL ONCE
Qty: 1 TABLET | Refills: 0 | Status: SHIPPED | OUTPATIENT
Start: 2018-09-18 | End: 2018-09-18

## 2018-09-18 NOTE — TELEPHONE ENCOUNTER
Spoke with patient about vaginal irritation. Prescribed diflucan as she has completed metrogel.    Chevy Medeiros MD

## 2018-09-27 ENCOUNTER — PATIENT MESSAGE (OUTPATIENT)
Dept: OBSTETRICS AND GYNECOLOGY | Facility: CLINIC | Age: 23
End: 2018-09-27

## 2018-09-28 DIAGNOSIS — N92.6 IRREGULAR MENSES: Primary | ICD-10-CM

## 2018-09-28 RX ORDER — MEDROXYPROGESTERONE ACETATE 10 MG/1
10 TABLET ORAL DAILY
Qty: 14 TABLET | Refills: 0 | Status: SHIPPED | OUTPATIENT
Start: 2018-09-28 | End: 2018-11-02 | Stop reason: SDUPTHER

## 2018-10-25 ENCOUNTER — PATIENT MESSAGE (OUTPATIENT)
Dept: OBSTETRICS AND GYNECOLOGY | Facility: CLINIC | Age: 23
End: 2018-10-25

## 2018-11-02 ENCOUNTER — OFFICE VISIT (OUTPATIENT)
Dept: OBSTETRICS AND GYNECOLOGY | Facility: CLINIC | Age: 23
End: 2018-11-02
Payer: COMMERCIAL

## 2018-11-02 VITALS
DIASTOLIC BLOOD PRESSURE: 76 MMHG | SYSTOLIC BLOOD PRESSURE: 110 MMHG | WEIGHT: 110.44 LBS | HEIGHT: 62 IN | BODY MASS INDEX: 20.32 KG/M2

## 2018-11-02 DIAGNOSIS — E28.2 PCOS (POLYCYSTIC OVARIAN SYNDROME): Primary | ICD-10-CM

## 2018-11-02 DIAGNOSIS — N92.6 IRREGULAR MENSES: ICD-10-CM

## 2018-11-02 PROCEDURE — 99213 OFFICE O/P EST LOW 20 MIN: CPT | Mod: S$GLB,,, | Performed by: OBSTETRICS & GYNECOLOGY

## 2018-11-02 PROCEDURE — 3008F BODY MASS INDEX DOCD: CPT | Mod: CPTII,S$GLB,, | Performed by: OBSTETRICS & GYNECOLOGY

## 2018-11-02 PROCEDURE — 99999 PR PBB SHADOW E&M-EST. PATIENT-LVL II: CPT | Mod: PBBFAC,,, | Performed by: OBSTETRICS & GYNECOLOGY

## 2018-11-02 RX ORDER — FLUCONAZOLE 150 MG/1
TABLET ORAL
Refills: 0 | COMMUNITY
Start: 2018-09-18 | End: 2019-01-11 | Stop reason: ALTCHOICE

## 2018-11-02 RX ORDER — OXYCODONE AND ACETAMINOPHEN 5; 325 MG/1; MG/1
1 TABLET ORAL
Refills: 0 | COMMUNITY
Start: 2018-08-29 | End: 2019-01-11

## 2018-11-02 RX ORDER — MEDROXYPROGESTERONE ACETATE 10 MG/1
10 TABLET ORAL DAILY
Qty: 14 TABLET | Refills: 2 | Status: SHIPPED | OUTPATIENT
Start: 2018-11-02 | End: 2018-11-06

## 2018-11-02 NOTE — PROGRESS NOTES
History & Physical  Gynecology      SUBJECTIVE:     Chief Complaint: Follow-up       History of Present Illness:  Ms. Bailey is a 22 y/o female who presents to clinic for follow up after taking provera to induce menses. She reports that she started provera on 2018 then started menses on 10/15/2018. She reports that she did have intercourse during days 12-14 of her cycle. She is currently trying to get pregnant. She was diagnosed with PCOS with oligomenorrhea. She did have a 3 month period with normal cycles during which time she was being stimulated for an egg retrieval and donation for her mother. She would like to start a family of her own as soon as possible. She is taking prenatal vitamin.      Review of patient's allergies indicates:  No Known Allergies    Past Medical History:   Diagnosis Date    Migraine     PCOS (polycystic ovarian syndrome)     Recurrent epistaxis      Past Surgical History:   Procedure Laterality Date    ADENOIDECTOMY      got eggs frozen Bilateral 2018    TONSILLECTOMY       OB History      Para Term  AB Living    0 0 0 0 0 0    SAB TAB Ectopic Multiple Live Births    0 0 0 0          Obstetric Comments    Gynhx;  13/Every 1-3 month/7-14 days  H/o PCOS  Pap neg 2015  H/o trichomonas        Family History   Problem Relation Age of Onset    Cancer Paternal Grandfather         breast     Hypertension Maternal Grandmother      Social History     Tobacco Use    Smoking status: Never Smoker    Smokeless tobacco: Never Used   Substance Use Topics    Alcohol use: No    Drug use: No       Current Outpatient Medications   Medication Sig    fluconazole (DIFLUCAN) 150 MG Tab TAKE 1 TABLET (150 MG TOTAL) BY MOUTH ONCE. REFILL AND RE-DOSE IF SYMPTOMS RECUR FOR 1 DOSE    medroxyPROGESTERone (PROVERA) 10 MG tablet Take 1 tablet (10 mg total) by mouth once daily. Start on 1st day of the month.    oxyCODONE-acetaminophen (PERCOCET) 5-325 mg per tablet Take 1 tablet  by mouth every 4 to 6 hours as needed.     No current facility-administered medications for this visit.          Review of Systems:  Review of Systems   Constitutional: Negative for chills and fever.   Eyes: Negative for visual disturbance.   Respiratory: Negative for cough.    Cardiovascular: Negative for chest pain.   Gastrointestinal: Negative for abdominal pain, nausea and vomiting.   Genitourinary: Negative for dysuria, frequency, hematuria, pelvic pain, vaginal bleeding, vaginal discharge and vaginal pain.   Neurological: Negative for headaches.        OBJECTIVE:     Physical Exam:  Physical Exam   Constitutional: She is oriented to person, place, and time. She appears well-developed and well-nourished. No distress.   Cardiovascular: Normal rate.   Pulmonary/Chest: Effort normal. No respiratory distress.   Neurological: She is alert and oriented to person, place, and time.   Skin: Skin is warm and dry.   Psychiatric: She has a normal mood and affect.   Vitals reviewed.  Discussion visit  ASSESSMENT:       ICD-10-CM ICD-9-CM    1. PCOS (polycystic ovarian syndrome) E28.2 256.4 Progesterone   2. Irregular menses N92.6 626.4 medroxyPROGESTERone (PROVERA) 10 MG tablet     Plan:      Kate was seen today for follow-up.    Diagnoses and all orders for this visit:    PCOS (polycystic ovarian syndrome)  -     Progesterone; Future. Day 21 progesterone  - Will do day 21 progesterone as an indication for ovulation  - Patient instructed to take pregnancy test the week of Nov 19th if she does not get her period    Irregular menses  -     medroxyPROGESTERone (PROVERA) 10 MG tablet; Take 1 tablet (10 mg total) by mouth once daily. Start on 1st day of the month.  - Menses after provera    Orders Placed This Encounter   Procedures    Progesterone       Follow-up if symptoms worsen or fail to improve.     Counseling time: 15 minutes    Chevy Noguera

## 2018-11-05 ENCOUNTER — LAB VISIT (OUTPATIENT)
Dept: LAB | Facility: HOSPITAL | Age: 23
End: 2018-11-05
Attending: OBSTETRICS & GYNECOLOGY
Payer: COMMERCIAL

## 2018-11-05 ENCOUNTER — PATIENT MESSAGE (OUTPATIENT)
Dept: OBSTETRICS AND GYNECOLOGY | Facility: CLINIC | Age: 23
End: 2018-11-05

## 2018-11-05 DIAGNOSIS — E28.2 PCOS (POLYCYSTIC OVARIAN SYNDROME): ICD-10-CM

## 2018-11-05 PROCEDURE — 36415 COLL VENOUS BLD VENIPUNCTURE: CPT

## 2018-11-05 PROCEDURE — 84144 ASSAY OF PROGESTERONE: CPT

## 2018-11-06 ENCOUNTER — PATIENT MESSAGE (OUTPATIENT)
Dept: OBSTETRICS AND GYNECOLOGY | Facility: CLINIC | Age: 23
End: 2018-11-06

## 2018-11-06 DIAGNOSIS — N92.6 IRREGULAR MENSES: ICD-10-CM

## 2018-11-06 LAB — PROGEST SERPL-MCNC: 0.3 NG/ML

## 2018-11-06 RX ORDER — MEDROXYPROGESTERONE ACETATE 10 MG/1
10 TABLET ORAL DAILY
Qty: 42 TABLET | Refills: 0 | Status: SHIPPED | OUTPATIENT
Start: 2018-11-06 | End: 2019-01-11

## 2018-11-12 ENCOUNTER — PATIENT MESSAGE (OUTPATIENT)
Dept: FAMILY MEDICINE | Facility: CLINIC | Age: 23
End: 2018-11-12

## 2018-11-29 ENCOUNTER — HOSPITAL ENCOUNTER (EMERGENCY)
Facility: HOSPITAL | Age: 23
Discharge: HOME | End: 2018-11-29
Attending: EMERGENCY MEDICINE
Payer: COMMERCIAL

## 2018-11-29 VITALS
OXYGEN SATURATION: 96 % | TEMPERATURE: 98 F | HEIGHT: 60 IN | BODY MASS INDEX: 21.99 KG/M2 | RESPIRATION RATE: 18 BRPM | WEIGHT: 112 LBS | HEART RATE: 68 BPM | SYSTOLIC BLOOD PRESSURE: 109 MMHG | DIASTOLIC BLOOD PRESSURE: 65 MMHG

## 2018-11-29 DIAGNOSIS — N30.01 ACUTE CYSTITIS WITH HEMATURIA: Primary | ICD-10-CM

## 2018-11-29 LAB
BACTERIA #/AREA URNS HPF: ABNORMAL /HPF
BILIRUB UR QL STRIP: NEGATIVE
CLARITY UR: ABNORMAL
COLOR UR: YELLOW
GLUCOSE UR QL STRIP: NEGATIVE
HGB UR QL STRIP: ABNORMAL
KETONES UR QL STRIP: NEGATIVE
LEUKOCYTE ESTERASE UR QL STRIP: ABNORMAL
MICROSCOPIC COMMENT: ABNORMAL
NITRITE UR QL STRIP: POSITIVE
PH UR STRIP: 6 [PH] (ref 5–8)
PROT UR QL STRIP: NEGATIVE
RBC #/AREA URNS HPF: 4 /HPF (ref 0–4)
SP GR UR STRIP: 1.01 (ref 1–1.03)
URN SPEC COLLECT METH UR: ABNORMAL
UROBILINOGEN UR STRIP-ACNC: ABNORMAL EU/DL
WBC #/AREA URNS HPF: >100 /HPF (ref 0–5)
WBC CLUMPS URNS QL MICRO: ABNORMAL

## 2018-11-29 PROCEDURE — 99284 EMERGENCY DEPT VISIT MOD MDM: CPT

## 2018-11-29 PROCEDURE — 81000 URINALYSIS NONAUTO W/SCOPE: CPT

## 2018-11-29 PROCEDURE — 87077 CULTURE AEROBIC IDENTIFY: CPT

## 2018-11-29 PROCEDURE — 87186 SC STD MICRODIL/AGAR DIL: CPT

## 2018-11-29 PROCEDURE — 87088 URINE BACTERIA CULTURE: CPT

## 2018-11-29 PROCEDURE — 87086 URINE CULTURE/COLONY COUNT: CPT

## 2018-11-29 RX ORDER — PHENAZOPYRIDINE HYDROCHLORIDE 200 MG/1
200 TABLET, FILM COATED ORAL 3 TIMES DAILY
Qty: 6 TABLET | Refills: 0 | Status: SHIPPED | OUTPATIENT
Start: 2018-11-29 | End: 2018-12-01

## 2018-11-29 RX ORDER — CEPHALEXIN 500 MG/1
500 CAPSULE ORAL 2 TIMES DAILY
Qty: 14 CAPSULE | Refills: 0 | Status: SHIPPED | OUTPATIENT
Start: 2018-11-29 | End: 2018-12-06

## 2018-11-29 NOTE — DISCHARGE INSTRUCTIONS
Take medications as directed.    Drink plenty of fluids.    Follow-up with primary care.    Return to the ER for any concerns.

## 2018-11-29 NOTE — ED TRIAGE NOTES
Patient reports low abdominal pain, urinary frequency, bladder pressure and burning with urination.  Patient reports nausea but denies vomiting.  Patient reports symptome started 5 days ago.  Denies blood in urine.

## 2018-11-29 NOTE — ED PROVIDER NOTES
"Encounter Date: 11/29/2018     This is a 23 y.o. female complaining of "bladder pressure," dysuria, and urinary frequency that began on 11/25/18. Tried several OTC medications from [facility] without relief of symptoms.    I have evaluated and conducted a medical screening exam with initial orders entered, if indicated, to expedite care. The patient will be placed in a treatment area when one is available. Care will be transferred to an alternate provider for a full assessment including but not limited to: history, physical exam, additional orders, and final disposition.    Eric Morales, IVONNE      SCRIBE #1 NOTE: IEvelyn, am scribing for, and in the presence of,  Cynthia Wiley PA-C. I have scribed the following portions of the note - Other sections scribed: HPI, ROS .       History     Chief Complaint   Patient presents with    Dysuria     Pt reports pressure on her bladder. Pt says she is going more often and it is begining to be painful to urinate.     CC: Dysuria    HPI: 23 y.o. Female with a past medical history of Migraine, PCOS, and Recurrent epistaxis presents to ED c/o acute onset dysuria and abdominal "pressure" for the past 5x days that worsened the last 2x days. She notes attempted tx with unknown UTI OTC medication with no relief. She denies N/V/D, fever, vaginal discharge, vaginal pain, and a new sex partner. No other associated symptoms. No alleviating factors.           The history is provided by the patient. No  was used.     Review of patient's allergies indicates:  No Known Allergies  Past Medical History:   Diagnosis Date    Migraine     PCOS (polycystic ovarian syndrome)     Recurrent epistaxis      Past Surgical History:   Procedure Laterality Date    ADENOIDECTOMY      got eggs frozen Bilateral 07/2018    TONSILLECTOMY       Family History   Problem Relation Age of Onset    Cancer Paternal Grandfather         breast     Hypertension Maternal Grandmother  "     Social History     Tobacco Use    Smoking status: Never Smoker    Smokeless tobacco: Never Used   Substance Use Topics    Alcohol use: No    Drug use: No     Review of Systems   Constitutional: Negative for appetite change and fever.   HENT: Negative for congestion, rhinorrhea and sore throat.    Eyes: Negative for pain, redness and visual disturbance.   Respiratory: Negative for cough and shortness of breath.    Cardiovascular: Negative for chest pain.   Gastrointestinal: Negative for abdominal pain, constipation, diarrhea, nausea and vomiting.   Genitourinary: Positive for dysuria and frequency. Negative for decreased urine volume, difficulty urinating, flank pain, genital sores, hematuria, menstrual problem, vaginal bleeding, vaginal discharge and vaginal pain.   Musculoskeletal: Negative for gait problem and neck pain.   Skin: Negative for rash.   Neurological: Negative for syncope and headaches.   Psychiatric/Behavioral: Negative for confusion.       Physical Exam     Initial Vitals [11/29/18 1315]   BP Pulse Resp Temp SpO2   112/67 73 18 98.2 °F (36.8 °C) 100 %      MAP       --         Physical Exam    Nursing note and vitals reviewed.  Constitutional: Vital signs are normal. She appears well-developed and well-nourished. She is not diaphoretic. She is cooperative.  Non-toxic appearance. She does not have a sickly appearance. She does not appear ill. No distress.   HENT:   Head: Normocephalic and atraumatic.   Right Ear: Tympanic membrane, external ear and ear canal normal.   Left Ear: Tympanic membrane, external ear and ear canal normal.   Nose: Nose normal.   Mouth/Throat: Uvula is midline, oropharynx is clear and moist and mucous membranes are normal. No oropharyngeal exudate.   Eyes: Conjunctivae, EOM and lids are normal. Pupils are equal, round, and reactive to light.   Neck: Trachea normal, normal range of motion, full passive range of motion without pain and phonation normal. Neck supple.    Cardiovascular: Normal rate, regular rhythm, normal heart sounds and intact distal pulses. Exam reveals no gallop and no friction rub.    No murmur heard.  Pulmonary/Chest: Effort normal and breath sounds normal. No respiratory distress. She has no decreased breath sounds. She has no wheezes. She has no rhonchi. She has no rales.   Abdominal: Soft. Normal appearance and bowel sounds are normal. She exhibits no distension and no mass. There is no tenderness. There is no rigidity, no rebound, no guarding and no CVA tenderness.   Musculoskeletal: Normal range of motion.   Neurological: She is alert and oriented to person, place, and time. She has normal strength.   Skin: Skin is warm and dry. Capillary refill takes less than 2 seconds. No rash noted.   Psychiatric: She has a normal mood and affect. Her speech is normal and behavior is normal. Judgment and thought content normal. Cognition and memory are normal.         ED Course   Procedures  Labs Reviewed   URINALYSIS, REFLEX TO URINE CULTURE - Abnormal; Notable for the following components:       Result Value    Appearance, UA Cloudy (*)     Occult Blood UA 1+ (*)     Nitrite, UA Positive (*)     Urobilinogen, UA 2.0-3.0 (*)     Leukocytes, UA 3+ (*)     All other components within normal limits    Narrative:     Preferred Collection Type->Urine, Clean Catch   URINALYSIS MICROSCOPIC - Abnormal; Notable for the following components:    WBC, UA >100 (*)     WBC Clumps, UA Many (*)     Bacteria, UA Many (*)     All other components within normal limits    Narrative:     Preferred Collection Type->Urine, Clean Catch   CULTURE, URINE   POCT URINE PREGNANCY          Imaging Results    None                APC / Resident Notes:   This is an evaluation of a 23 y.o. female that presents to the Emergency Department for dysuria.  Patient reports dysuria and urinary frequency x5 days.  She denies any vaginal symptoms, abdominal pain or further symptoms.  She reports attempted  treatment with over-the-counter UTI medications with no relief.     Exam findings: Patient is non-toxic, afebrile and well appearing.  Abdomen is soft and nontender.  Bowel sounds appreciated. No peritoneal signs. No CVA tenderness.  The remainder of exam is unremarkable.    If available, past records have been reviewed.  Vitals are reassuring.  Results:   UPT negative  Urinalysis is revealing for UTI.  Urine culture pending.    My overall impression:  Acute cystitis with hematuria  DDx:  Acute cystitis, UTI, dysuria  I do not suspect pyelonephritis.    ED course:  I will prescribe Keflex and Pyridium.  I will recommend follow up with primary care.  I feel this patient is stable for discharge. ED return precautions given.    The diagnosis and treatment plan have been discussed with the patient. All questions and concerns have been addressed. Patient expressed understanding. An educational information sheet was given to the patient prior to discharge.     Cynthia Wiley PA-C         Scribe Attestation:   Scribe #1: I performed the above scribed service and the documentation accurately describes the services I performed. I attest to the accuracy of the note.    Attending Attestation:           Physician Attestation for Scribe:  Physician Attestation Statement for Scribe #1: I, Cynthia Wiley PA-C, reviewed documentation, as scribed by Evelyn Barba in my presence, and it is both accurate and complete.                    Clinical Impression:   The encounter diagnosis was Acute cystitis with hematuria.      Disposition:   Disposition: Discharged  Condition: Stable                        Cynthia Wiley PA-C  11/29/18 1702

## 2018-12-01 LAB — BACTERIA UR CULT: NORMAL

## 2019-01-11 ENCOUNTER — OFFICE VISIT (OUTPATIENT)
Dept: OBSTETRICS AND GYNECOLOGY | Facility: CLINIC | Age: 24
End: 2019-01-11
Payer: COMMERCIAL

## 2019-01-11 VITALS
SYSTOLIC BLOOD PRESSURE: 110 MMHG | BODY MASS INDEX: 24.97 KG/M2 | WEIGHT: 127.19 LBS | TEMPERATURE: 99 F | HEIGHT: 60 IN | DIASTOLIC BLOOD PRESSURE: 64 MMHG

## 2019-01-11 DIAGNOSIS — B37.31 YEAST VAGINITIS: ICD-10-CM

## 2019-01-11 DIAGNOSIS — R30.0 DYSURIA: Primary | ICD-10-CM

## 2019-01-11 DIAGNOSIS — N76.0 VAGINOSIS: ICD-10-CM

## 2019-01-11 LAB
BILIRUB SERPL-MCNC: NEGATIVE MG/DL
BLOOD URINE, POC: ABNORMAL
COLOR, POC UA: ABNORMAL
GLUCOSE UR QL STRIP: NORMAL
KETONES UR QL STRIP: NEGATIVE
LEUKOCYTE ESTERASE URINE, POC: ABNORMAL
NITRITE, POC UA: POSITIVE
PH, POC UA: 8
PROTEIN, POC: ABNORMAL
SPECIFIC GRAVITY, POC UA: 1
UROBILINOGEN, POC UA: 4

## 2019-01-11 PROCEDURE — 87088 URINE BACTERIA CULTURE: CPT

## 2019-01-11 PROCEDURE — 87186 SC STD MICRODIL/AGAR DIL: CPT

## 2019-01-11 PROCEDURE — 81002 POCT URINE DIPSTICK WITHOUT MICROSCOPE: ICD-10-PCS | Mod: S$GLB,,, | Performed by: OBSTETRICS & GYNECOLOGY

## 2019-01-11 PROCEDURE — 87480 CANDIDA DNA DIR PROBE: CPT

## 2019-01-11 PROCEDURE — 99999 PR PBB SHADOW E&M-EST. PATIENT-LVL III: CPT | Mod: PBBFAC,,, | Performed by: OBSTETRICS & GYNECOLOGY

## 2019-01-11 PROCEDURE — 99999 PR PBB SHADOW E&M-EST. PATIENT-LVL III: ICD-10-PCS | Mod: PBBFAC,,, | Performed by: OBSTETRICS & GYNECOLOGY

## 2019-01-11 PROCEDURE — 81002 URINALYSIS NONAUTO W/O SCOPE: CPT | Mod: S$GLB,,, | Performed by: OBSTETRICS & GYNECOLOGY

## 2019-01-11 PROCEDURE — 3008F PR BODY MASS INDEX (BMI) DOCUMENTED: ICD-10-PCS | Mod: CPTII,S$GLB,, | Performed by: OBSTETRICS & GYNECOLOGY

## 2019-01-11 PROCEDURE — 99214 PR OFFICE/OUTPT VISIT, EST, LEVL IV, 30-39 MIN: ICD-10-PCS | Mod: 25,S$GLB,, | Performed by: OBSTETRICS & GYNECOLOGY

## 2019-01-11 PROCEDURE — 3008F BODY MASS INDEX DOCD: CPT | Mod: CPTII,S$GLB,, | Performed by: OBSTETRICS & GYNECOLOGY

## 2019-01-11 PROCEDURE — 87086 URINE CULTURE/COLONY COUNT: CPT

## 2019-01-11 PROCEDURE — 99214 OFFICE O/P EST MOD 30 MIN: CPT | Mod: 25,S$GLB,, | Performed by: OBSTETRICS & GYNECOLOGY

## 2019-01-11 PROCEDURE — 87077 CULTURE AEROBIC IDENTIFY: CPT

## 2019-01-11 RX ORDER — CLINDAMYCIN HYDROCHLORIDE 300 MG/1
300 CAPSULE ORAL 2 TIMES DAILY
Qty: 14 CAPSULE | Refills: 0 | Status: SHIPPED | OUTPATIENT
Start: 2019-01-11 | End: 2019-01-18

## 2019-01-11 RX ORDER — TERCONAZOLE 8 MG/G
1 CREAM VAGINAL NIGHTLY
Qty: 20 G | Refills: 0 | Status: SHIPPED | OUTPATIENT
Start: 2019-01-11 | End: 2019-01-14

## 2019-01-11 RX ORDER — SULFAMETHOXAZOLE AND TRIMETHOPRIM 800; 160 MG/1; MG/1
1 TABLET ORAL 2 TIMES DAILY
Qty: 6 TABLET | Refills: 0 | Status: SHIPPED | OUTPATIENT
Start: 2019-01-11 | End: 2019-01-13

## 2019-01-11 NOTE — PROGRESS NOTES
History & Physical  Gynecology      SUBJECTIVE:     Chief Complaint: Vaginal Discharge (Pt complaining of possible Yeast infection ) and Urinary Frequency       History of Present Illness:  Ms. Bailey is a 22 y/o female who presents to clinic with vaginal irritation and vaginal discharge. She was seen in the ED and given Diflucan although she tested positive for BV. She was given Kelfex for E.coli UTI. S.he reports that she still has pain with urination and vaginal irritation. She c/o vaginal discharge. She denies vaginal odor.    Review of patient's allergies indicates:  No Known Allergies    Past Medical History:   Diagnosis Date    Migraine     PCOS (polycystic ovarian syndrome)     Recurrent epistaxis      Past Surgical History:   Procedure Laterality Date    ADENOIDECTOMY      got eggs frozen Bilateral 2018    TONSILLECTOMY       OB History      Para Term  AB Living    0 0 0 0 0 0    SAB TAB Ectopic Multiple Live Births    0 0 0 0          Obstetric Comments    Gynhx;  13/Every 1-3 month/7-14 days  H/o PCOS  Pap neg 2015  H/o trichomonas        Family History   Problem Relation Age of Onset    Cancer Paternal Grandfather         breast     Hypertension Maternal Grandmother      Social History     Tobacco Use    Smoking status: Never Smoker    Smokeless tobacco: Never Used   Substance Use Topics    Alcohol use: No    Drug use: No       Current Outpatient Medications   Medication Sig    clindamycin (CLEOCIN) 300 MG capsule Take 1 capsule (300 mg total) by mouth 2 (two) times daily. for 7 days    sulfamethoxazole-trimethoprim 800-160mg (BACTRIM DS) 800-160 mg Tab Take 1 tablet by mouth 2 (two) times daily. for 3 days    terconazole (TERAZOL 3) 0.8 % vaginal cream Place 1 applicator vaginally every evening. for 3 days     No current facility-administered medications for this visit.          Review of Systems:  Review of Systems   Constitutional: Negative for chills and fever.    Eyes: Negative for visual disturbance.   Respiratory: Negative for cough and wheezing.    Cardiovascular: Negative for chest pain and palpitations.   Gastrointestinal: Negative for abdominal pain, nausea and vomiting.   Genitourinary: Positive for dysuria and vaginal discharge. Negative for frequency, hematuria, pelvic pain, vaginal bleeding and vaginal pain.        Vaginal irritation   Neurological: Negative for headaches.        OBJECTIVE:     Physical Exam:  Physical Exam   Constitutional: She is oriented to person, place, and time. She appears well-developed and well-nourished.   Cardiovascular: Normal rate and normal heart sounds.   Pulmonary/Chest: Effort normal. No respiratory distress.   Genitourinary: Vaginal discharge found.   Genitourinary Comments: copious amount of thick white discharge in vault   Neurological: She is alert and oriented to person, place, and time.   Nursing note and vitals reviewed.      ASSESSMENT:       ICD-10-CM ICD-9-CM    1. Dysuria R30.0 788.1 Urine culture      sulfamethoxazole-trimethoprim 800-160mg (BACTRIM DS) 800-160 mg Tab      POCT URINE DIPSTICK WITHOUT MICROSCOPE   2. Vaginosis N76.0 616.10 Vaginosis Screen by DNA Probe      clindamycin (CLEOCIN) 300 MG capsule   3. Yeast vaginitis B37.3 112.1 terconazole (TERAZOL 3) 0.8 % vaginal cream      Plan:      Kate was seen today for vaginal discharge and urinary frequency.    Diagnoses and all orders for this visit:    Dysuria  -     Urine culture  -     sulfamethoxazole-trimethoprim 800-160mg (BACTRIM DS) 800-160 mg Tab; Take 1 tablet by mouth 2 (two) times daily. for 3 days  -     POCT URINE DIPSTICK WITHOUT MICROSCOPE    Vaginosis  -     Vaginosis Screen by DNA Probe  -     clindamycin (CLEOCIN) 300 MG capsule; Take 1 capsule (300 mg total) by mouth 2 (two) times daily. for 7 days    Yeast vaginitis  -     terconazole (TERAZOL 3) 0.8 % vaginal cream; Place 1 applicator vaginally every evening. for 3 days        Orders  Placed This Encounter   Procedures    Urine culture    Vaginosis Screen by DNA Probe    POCT URINE DIPSTICK WITHOUT MICROSCOPE       Follow-up in about 4 weeks (around 2/8/2019) for start ovulation induction.    Counseling time: 15 minutes    Chevy Noguera

## 2019-01-12 NOTE — PATIENT INSTRUCTIONS
Preventing Vaginitis     Use mild, unscented soap when you bathe or shower to avoid irritating your vagina.    Vaginitis is irritation or infection of the vagina or vulva (the outside opening of the vagina). Vaginitis can be caused by bacteria, viruses, parasites, or yeast. Chemicals (such as in perfumes or soaps or in spermicides) can sometimes be a cause. Vaginitis can be caused by hormone changes in pregnancy or with menopause. You can help prevent vaginitis. Follow the tips below. And see your healthcare provider if you have any symptoms.  Hygiene  · Avoid chemicals. Do not use vaginal sprays. Do not use scented toilet paper or tampons that are scented. Sprays and scents have chemicals that can irritate your vagina.  · Do not douche unless you are told to by your healthcare provider. Douching is rarely needed. And it upsets the normal balance in the vagina.  · Wash yourself well. Wash the outer vaginal area (vulva) every day with mild, unscented soap. Keep it as dry as possible.  · Wipe correctly. Make sure to wipe from front to back after a bowel movement. This helps keep from spreading bacteria from your anus to your vagina.  · Change your tampon often. During your period, make sure to change your tampon as often as directed on the package. This allows the normal flow of vaginal discharge and blood.  Lifestyle  · Limit your number of sexual partners. The more partners you have, the greater your risk of infection. Using condoms helps reduce your risk.  · Get enough sleep. Sleep helps keep your bodys immune system healthy. This helps you fight infection.  · Lose weight, if needed. Excess weight can reduce air circulation around your vagina. This can increase your risk of infection.  · Exercise regularly. Regular activity helps keep your body healthy.  · Take antibiotics only as directed. Antibiotics can change the normal chemical balance in the vagina.    Clothing  · Dont sit in wet clothes. Yeast thrives  when its warm and damp.  · Dont wear tight pants. And dont wear tights, leggings, or hose without a cotton crotch. These types of clothing trap warmth and moisture.  · Wear cotton underwear. Cotton lets air circulate around the vagina.  Symptoms of vaginitis  · Irritation, swelling, or itching of the genital area  · Vaginal discharge  · Bad vaginal odor  · Pain or burning during urination   Date Last Reviewed: 12/1/2016  © 5617-2664 [employer]. [address] [place] [address], [place] [address]. All rights reserved. This information is not intended as a substitute for professional medical care. Always follow your healthcare professional's instructions.

## 2019-01-13 DIAGNOSIS — N30.00 ACUTE CYSTITIS WITHOUT HEMATURIA: Primary | ICD-10-CM

## 2019-01-13 LAB
BACTERIA UR CULT: NORMAL
CANDIDA RRNA VAG QL PROBE: POSITIVE
G VAGINALIS RRNA GENITAL QL PROBE: NEGATIVE
T VAGINALIS RRNA GENITAL QL PROBE: NEGATIVE

## 2019-01-13 RX ORDER — NITROFURANTOIN 25; 75 MG/1; MG/1
100 CAPSULE ORAL EVERY 12 HOURS
Status: DISCONTINUED | OUTPATIENT
Start: 2019-01-13 | End: 2019-01-16

## 2019-01-16 ENCOUNTER — TELEPHONE (OUTPATIENT)
Dept: OBSTETRICS AND GYNECOLOGY | Facility: CLINIC | Age: 24
End: 2019-01-16

## 2019-01-16 ENCOUNTER — PATIENT MESSAGE (OUTPATIENT)
Dept: OBSTETRICS AND GYNECOLOGY | Facility: CLINIC | Age: 24
End: 2019-01-16

## 2019-01-16 RX ORDER — NITROFURANTOIN 25; 75 MG/1; MG/1
100 CAPSULE ORAL 2 TIMES DAILY
Qty: 14 CAPSULE | Refills: 0 | Status: SHIPPED | OUTPATIENT
Start: 2019-01-16 | End: 2019-01-23

## 2019-01-16 NOTE — TELEPHONE ENCOUNTER
1/16/19 @ 1521 ([facility])  HI DR AGUILAR THIS PT STATED THAT YOU WERE SUPPOSED TO ORDER ANOTHER MEDICATION FOR HER BUT THE PHARMACY NEVER RECEIVED THE ORDER, I SAW AN ORDER FOR MACROBID ON 1/13/19 . BUT IT NEVER WENT THRU.

## 2019-01-25 ENCOUNTER — PATIENT MESSAGE (OUTPATIENT)
Dept: OBSTETRICS AND GYNECOLOGY | Facility: CLINIC | Age: 24
End: 2019-01-25

## 2019-02-01 ENCOUNTER — OFFICE VISIT (OUTPATIENT)
Dept: OBSTETRICS AND GYNECOLOGY | Facility: CLINIC | Age: 24
End: 2019-02-01
Payer: COMMERCIAL

## 2019-02-01 VITALS — BODY MASS INDEX: 25.9 KG/M2 | HEIGHT: 60 IN | WEIGHT: 131.94 LBS

## 2019-02-01 DIAGNOSIS — N92.6 MENSTRUAL IRREGULARITY: ICD-10-CM

## 2019-02-01 DIAGNOSIS — N97.0 INFERTILITY ASSOCIATED WITH ANOVULATION: Primary | ICD-10-CM

## 2019-02-01 DIAGNOSIS — E28.2 PCOS (POLYCYSTIC OVARIAN SYNDROME): ICD-10-CM

## 2019-02-01 DIAGNOSIS — N89.8 VAGINAL DISCHARGE: ICD-10-CM

## 2019-02-01 PROCEDURE — 3008F BODY MASS INDEX DOCD: CPT | Mod: CPTII,S$GLB,, | Performed by: OBSTETRICS & GYNECOLOGY

## 2019-02-01 PROCEDURE — 99215 PR OFFICE/OUTPT VISIT, EST, LEVL V, 40-54 MIN: ICD-10-PCS | Mod: S$GLB,,, | Performed by: OBSTETRICS & GYNECOLOGY

## 2019-02-01 PROCEDURE — 87491 CHLMYD TRACH DNA AMP PROBE: CPT

## 2019-02-01 PROCEDURE — 99999 PR PBB SHADOW E&M-EST. PATIENT-LVL III: ICD-10-PCS | Mod: PBBFAC,,, | Performed by: OBSTETRICS & GYNECOLOGY

## 2019-02-01 PROCEDURE — 87480 CANDIDA DNA DIR PROBE: CPT

## 2019-02-01 PROCEDURE — 99215 OFFICE O/P EST HI 40 MIN: CPT | Mod: S$GLB,,, | Performed by: OBSTETRICS & GYNECOLOGY

## 2019-02-01 PROCEDURE — 87147 CULTURE TYPE IMMUNOLOGIC: CPT

## 2019-02-01 PROCEDURE — 87086 URINE CULTURE/COLONY COUNT: CPT

## 2019-02-01 PROCEDURE — 87088 URINE BACTERIA CULTURE: CPT

## 2019-02-01 PROCEDURE — 3008F PR BODY MASS INDEX (BMI) DOCUMENTED: ICD-10-PCS | Mod: CPTII,S$GLB,, | Performed by: OBSTETRICS & GYNECOLOGY

## 2019-02-01 PROCEDURE — 99999 PR PBB SHADOW E&M-EST. PATIENT-LVL III: CPT | Mod: PBBFAC,,, | Performed by: OBSTETRICS & GYNECOLOGY

## 2019-02-01 PROCEDURE — 87186 SC STD MICRODIL/AGAR DIL: CPT

## 2019-02-01 PROCEDURE — 87077 CULTURE AEROBIC IDENTIFY: CPT

## 2019-02-01 RX ORDER — LETROZOLE 2.5 MG/1
2.5 TABLET, FILM COATED ORAL DAILY
Qty: 5 TABLET | Refills: 0 | Status: SHIPPED | OUTPATIENT
Start: 2019-02-01 | End: 2019-02-06

## 2019-02-01 RX ORDER — MEDROXYPROGESTERONE ACETATE 10 MG/1
10 TABLET ORAL DAILY
Qty: 10 TABLET | Refills: 6 | Status: SHIPPED | OUTPATIENT
Start: 2019-02-01 | End: 2019-05-16

## 2019-02-01 RX ORDER — TERCONAZOLE 8 MG/G
1 CREAM VAGINAL NIGHTLY
Qty: 20 G | Refills: 0 | Status: SHIPPED | OUTPATIENT
Start: 2019-02-01 | End: 2019-02-04

## 2019-02-01 NOTE — PATIENT INSTRUCTIONS
Understanding Fertility Problems: Treatments for Women  There are several ways to treat problems that affect a woman's fertility. Some treatments help sperm or eggs pass through the reproductive tract. Others help an embryo implant in the uterus. Your doctor will talk with you about your options.     Problems that may affect female fertility.   Treating the cervix  Problems with the cervix may stop sperm from entering the uterus. They include:  · A lack of cervical mucus. This can slow or block the passage of sperm. This can be treated with assisted reproductive techniques.  · An infection. This can be treated with antibiotics.  Treating the fallopian tubes  There may be a problem in the fallopian tubes. This can stop sperm from reaching an egg. The treatment depends on the cause. Causes include:  · A tubal blockage near the uterus. This can be treated by putting a thin tube (catheter) through the blocked tube. Or using assisted reproductive techniques.  · Adhesions (scar tissue). These can be removed with surgery. Or assisted reproductive techniques may be used.  Improving implantation  There are several reasons why a fertilized egg may not implant in the uterus. The treatment depends on the cause. Causes include:  · Problems with the endometrium. These can be treated with hormone therapy.  · Adhesions in the uterus. These are treated with surgery.  · Small growths called fibroids. These are removed with surgery.  · A uterine septum. This is tissue that divides the uterus into two parts inside. This is treated with surgery.  Treating endometriosis  The endometrium is the lining of the uterus. In some cases, this tissue can grow in other parts of the reproductive tract. It can lead to fertility problems. Laparoscopic surgery is done to remove the tissue.  Reversing tubal ligation  Tubal ligation is a surgery to prevent pregnancy by blocking the fallopian tubes. In some cases, it can be reversed with surgery to  reconnect the tubes. But the chances of success are not certain. Talk with your doctor to learn more.   Date Last Reviewed: 5/21/2015  © 9472-3594 [employer]. [address] [place] [address], [place] [address]. All rights reserved. This information is not intended as a substitute for professional medical care. Always follow your healthcare professional's instructions.        PROVERA TAKE 1ST - 10TH OF THE MONTH    LETROZOLE TAKE CYCLE DAY 3-7    INTERCOURSE WHEN OVULATING CYCLE DAY 12-14

## 2019-02-03 LAB
CANDIDA RRNA VAG QL PROBE: NEGATIVE
G VAGINALIS RRNA GENITAL QL PROBE: NEGATIVE
T VAGINALIS RRNA GENITAL QL PROBE: NEGATIVE

## 2019-02-04 DIAGNOSIS — N30.00 ACUTE CYSTITIS WITHOUT HEMATURIA: Primary | ICD-10-CM

## 2019-02-04 LAB
BACTERIA UR CULT: NORMAL
BACTERIA UR CULT: NORMAL

## 2019-02-04 RX ORDER — AMOXICILLIN AND CLAVULANATE POTASSIUM 875; 125 MG/1; MG/1
1 TABLET, FILM COATED ORAL EVERY 12 HOURS
Qty: 10 TABLET | Refills: 0 | Status: SHIPPED | OUTPATIENT
Start: 2019-02-04 | End: 2019-02-09

## 2019-02-04 NOTE — PROGRESS NOTES
History & Physical  Gynecology      SUBJECTIVE:     Chief Complaint: Vaginal Discharge       History of Present Illness:  Ms. Bailey is a 22 y/o female who presents to clinic c/o vaginal discharge. She reports that she thinks that she has a yeast infection like she had before. She reports that she was taking her medication for UTI sparingly over the past 1-2 weeks. She denies vaginal odor.     She also c/o menstrual irregularity again. She reports that the past 3 months she did not take provera as her periods had been normal but over the past month she did not get a period. She and her boyfriend of 9 years would like to start trying to have a child. Patient was stimulated and had egg retrieval for her mother through [facility] in the past.      Review of patient's allergies indicates:  No Known Allergies    Past Medical History:   Diagnosis Date    Migraine     PCOS (polycystic ovarian syndrome)     Recurrent epistaxis      Past Surgical History:   Procedure Laterality Date    ADENOIDECTOMY      got eggs frozen Bilateral 2018    TONSILLECTOMY       OB History      Para Term  AB Living    0 0 0 0 0 0    SAB TAB Ectopic Multiple Live Births    0 0 0 0          Obstetric Comments    Gynhx;  13/Every 1-3 month/7-14 days  H/o PCOS  Pap neg 2015  H/o trichomonas        Family History   Problem Relation Age of Onset    Cancer Paternal Grandfather         breast     Hypertension Maternal Grandmother      Social History     Tobacco Use    Smoking status: Never Smoker    Smokeless tobacco: Never Used   Substance Use Topics    Alcohol use: No    Drug use: No       Current Outpatient Medications   Medication Sig    letrozole (FEMARA) 2.5 mg Tab Take 1 tablet (2.5 mg total) by mouth once daily Cycle day 3-7.    medroxyPROGESTERone (PROVERA) 10 MG tablet Take 1 tablet (10 mg total) by mouth once daily. for 10 days    prenatal 26-iron ps-folic-dha (VITAFOL-ONE) 29 mg iron- 1 mg-200 mg Cap Take 1  capsule by mouth once daily.    terconazole (TERAZOL 3) 0.8 % vaginal cream Place 1 applicator vaginally every evening. for 3 days     No current facility-administered medications for this visit.          Review of Systems:  Review of Systems   Constitutional: Negative for chills and fever.   Eyes: Negative for visual disturbance.   Respiratory: Negative for cough and wheezing.    Cardiovascular: Negative for chest pain and palpitations.   Gastrointestinal: Negative for abdominal pain, nausea and vomiting.   Genitourinary: Positive for vaginal discharge. Negative for dysuria, frequency, hematuria, pelvic pain, vaginal bleeding and vaginal pain.        OBJECTIVE:     Physical Exam:  Physical Exam   Constitutional: She is oriented to person, place, and time. She appears well-developed and well-nourished. No distress.   Cardiovascular: Normal rate and normal heart sounds.   Pulmonary/Chest: Effort normal. No respiratory distress.   Genitourinary: Vaginal discharge found.   Genitourinary Comments: Thick white discharge in vault   Neurological: She is alert and oriented to person, place, and time.   Skin: Skin is warm and dry.   Psychiatric: She has a normal mood and affect.   Nursing note and vitals reviewed.        ASSESSMENT:       ICD-10-CM ICD-9-CM    1. Infertility associated with anovulation N97.0 628.0 letrozole (FEMARA) 2.5 mg Tab      prenatal 26-iron ps-folic-dha (VITAFOL-ONE) 29 mg iron- 1 mg-200 mg Cap      CBC auto differential      Rubella antibody, IgG      VARICELLA ZOSTER ANTIBODY, IGG   2. Vaginal discharge N89.8 623.5 terconazole (TERAZOL 3) 0.8 % vaginal cream      Urine culture      Vaginosis Screen by DNA Probe      C. trachomatis/N. gonorrhoeae by AMP DNA   3. PCOS (polycystic ovarian syndrome) E28.2 256.4    4. Menstrual irregularity N92.6 626.4 medroxyPROGESTERone (PROVERA) 10 MG tablet          Plan:      Kate was seen today for vaginal discharge.    Diagnoses and all orders for this  visit:    Infertility associated with anovulation  -     letrozole (FEMARA) 2.5 mg Tab; Take 1 tablet (2.5 mg total) by mouth once daily Cycle day 3-7.  -     prenatal 26-iron ps-folic-dha (VITAFOL-ONE) 29 mg iron- 1 mg-200 mg Cap; Take 1 capsule by mouth once daily.  -     CBC auto differential; Future  -     Rubella antibody, IgG; Future  -     VARICELLA ZOSTER ANTIBODY, IGG; Future    Vaginal discharge  -     terconazole (TERAZOL 3) 0.8 % vaginal cream; Place 1 applicator vaginally every evening. for 3 days  -     Urine culture  -     Vaginosis Screen by DNA Probe  -     C. trachomatis/N. gonorrhoeae by AMP DNA    PCOS (polycystic ovarian syndrome)  - PCOS with fertility treatments in the past   - Menstrual irregularity with the last 3 months of normal periods    Menstrual irregularity  -     medroxyPROGESTERone (PROVERA) 10 MG tablet; Take 1 tablet (10 mg total) by mouth once daily. for 10 days        Orders Placed This Encounter   Procedures    Urine culture    Vaginosis Screen by DNA Probe    C. trachomatis/N. gonorrhoeae by AMP DNA    CBC auto differential    Rubella antibody, IgG    VARICELLA ZOSTER ANTIBODY, IGG       Follow-up in about 3 months (around 5/1/2019) for f/u infertility.    Counseling time: 30 minutes    Chevy Noguera

## 2019-02-05 LAB
C TRACH DNA SPEC QL NAA+PROBE: NOT DETECTED
N GONORRHOEA DNA SPEC QL NAA+PROBE: NOT DETECTED

## 2019-03-23 ENCOUNTER — PATIENT MESSAGE (OUTPATIENT)
Dept: OBSTETRICS AND GYNECOLOGY | Facility: CLINIC | Age: 24
End: 2019-03-23

## 2019-03-25 DIAGNOSIS — N30.00 ACUTE CYSTITIS WITHOUT HEMATURIA: Primary | ICD-10-CM

## 2019-03-25 RX ORDER — AMOXICILLIN AND CLAVULANATE POTASSIUM 875; 125 MG/1; MG/1
1 TABLET, FILM COATED ORAL EVERY 12 HOURS
Qty: 14 TABLET | Refills: 0 | Status: SHIPPED | OUTPATIENT
Start: 2019-03-25 | End: 2019-04-01

## 2019-03-27 ENCOUNTER — HOSPITAL ENCOUNTER (EMERGENCY)
Facility: HOSPITAL | Age: 24
Discharge: HOME | End: 2019-03-27
Attending: EMERGENCY MEDICINE
Payer: COMMERCIAL

## 2019-03-27 VITALS
SYSTOLIC BLOOD PRESSURE: 112 MMHG | BODY MASS INDEX: 23.73 KG/M2 | TEMPERATURE: 99 F | RESPIRATION RATE: 20 BRPM | HEART RATE: 91 BPM | OXYGEN SATURATION: 99 % | WEIGHT: 110 LBS | DIASTOLIC BLOOD PRESSURE: 59 MMHG | HEIGHT: 57 IN

## 2019-03-27 DIAGNOSIS — N10 ACUTE PYELONEPHRITIS: Primary | ICD-10-CM

## 2019-03-27 LAB
B-HCG UR QL: NEGATIVE
BACTERIA #/AREA URNS HPF: ABNORMAL /HPF
BILIRUB UR QL STRIP: NEGATIVE
CLARITY UR: CLEAR
COLOR UR: YELLOW
CTP QC/QA: YES
GLUCOSE UR QL STRIP: NEGATIVE
HGB UR QL STRIP: NEGATIVE
KETONES UR QL STRIP: ABNORMAL
LEUKOCYTE ESTERASE UR QL STRIP: ABNORMAL
MICROSCOPIC COMMENT: ABNORMAL
NITRITE UR QL STRIP: NEGATIVE
PH UR STRIP: 6 [PH] (ref 5–8)
PROT UR QL STRIP: NEGATIVE
RBC #/AREA URNS HPF: 2 /HPF (ref 0–4)
SP GR UR STRIP: 1.02 (ref 1–1.03)
SQUAMOUS #/AREA URNS HPF: 4 /HPF
URN SPEC COLLECT METH UR: ABNORMAL
UROBILINOGEN UR STRIP-ACNC: ABNORMAL EU/DL
WBC #/AREA URNS HPF: 11 /HPF (ref 0–5)
YEAST URNS QL MICRO: ABNORMAL

## 2019-03-27 PROCEDURE — 25000003 PHARM REV CODE 250: Performed by: PHYSICIAN ASSISTANT

## 2019-03-27 PROCEDURE — 81025 URINE PREGNANCY TEST: CPT | Performed by: NURSE PRACTITIONER

## 2019-03-27 PROCEDURE — 87086 URINE CULTURE/COLONY COUNT: CPT

## 2019-03-27 PROCEDURE — 63600175 PHARM REV CODE 636 W HCPCS: Performed by: PHYSICIAN ASSISTANT

## 2019-03-27 PROCEDURE — 96372 THER/PROPH/DIAG INJ SC/IM: CPT

## 2019-03-27 PROCEDURE — 81000 URINALYSIS NONAUTO W/SCOPE: CPT

## 2019-03-27 PROCEDURE — 99284 EMERGENCY DEPT VISIT MOD MDM: CPT | Mod: 25

## 2019-03-27 RX ORDER — ACETAMINOPHEN 500 MG
1000 TABLET ORAL
Status: COMPLETED | OUTPATIENT
Start: 2019-03-27 | End: 2019-03-27

## 2019-03-27 RX ORDER — PHENAZOPYRIDINE HYDROCHLORIDE 200 MG/1
200 TABLET, FILM COATED ORAL
Qty: 12 TABLET | Refills: 0 | Status: SHIPPED | OUTPATIENT
Start: 2019-03-27 | End: 2019-03-31

## 2019-03-27 RX ORDER — CEPHALEXIN 500 MG/1
1000 CAPSULE ORAL EVERY 12 HOURS
Qty: 40 CAPSULE | Refills: 0 | Status: SHIPPED | OUTPATIENT
Start: 2019-03-27 | End: 2019-04-06

## 2019-03-27 RX ORDER — LIDOCAINE HYDROCHLORIDE 10 MG/ML
10 INJECTION INFILTRATION; PERINEURAL
Status: COMPLETED | OUTPATIENT
Start: 2019-03-27 | End: 2019-03-27

## 2019-03-27 RX ORDER — CEFTRIAXONE 1 G/1
1 INJECTION, POWDER, FOR SOLUTION INTRAMUSCULAR; INTRAVENOUS
Status: COMPLETED | OUTPATIENT
Start: 2019-03-27 | End: 2019-03-27

## 2019-03-27 RX ORDER — CEPHALEXIN 500 MG/1
500 CAPSULE ORAL
Status: DISCONTINUED | OUTPATIENT
Start: 2019-03-27 | End: 2019-03-27

## 2019-03-27 RX ORDER — ONDANSETRON 4 MG/1
8 TABLET, FILM COATED ORAL EVERY 12 HOURS PRN
Qty: 12 TABLET | Refills: 0 | Status: SHIPPED | OUTPATIENT
Start: 2019-03-27 | End: 2019-05-16

## 2019-03-27 RX ADMIN — ACETAMINOPHEN 1000 MG: 500 TABLET, FILM COATED ORAL at 01:03

## 2019-03-27 RX ADMIN — CEFTRIAXONE SODIUM 1 G: 1 INJECTION, POWDER, FOR SOLUTION INTRAMUSCULAR; INTRAVENOUS at 01:03

## 2019-03-27 RX ADMIN — LIDOCAINE HYDROCHLORIDE 2.1 ML: 10 INJECTION, SOLUTION INFILTRATION; PERINEURAL at 01:03

## 2019-03-27 NOTE — ED PROVIDER NOTES
Encounter Date: 3/27/2019       History     Chief Complaint   Patient presents with    Urinary Frequency     Patient reports fever, chills, right flank pain, lower abdominal pain, vomiting, increased urinary frequency. Patient states that she was seen by her PCP, was dx with a UTI and prescribed antibiotics. Patient states that she is on day 3 of Amoxicillin, but symptoms have worsened.    Flank Pain    Fever     23-year-old female with no pertinent past medical history presents to emergency department for 1 month history of gradually worsening right flank pain that radiates to the diffuse lower abdomen associated with urinary frequency and urgency.  Denies pelvic pain, vaginal bleeding, vaginal discharge, concern for STD today.  States she has had bladder infections in the past that feel similar to current symptoms.  States that she started Amoxil yesterday from her OBGYN for urinalysis results that were obtained last month.  Patient is just not picking up this prescription.  Denies history of renal stones and PID.  Motrin taken earlier this morning with some relief of symptoms. Notes nausea associated with a couple episodes of vomiting over the past 3 days.  Currently denies nausea at this present time.    The history is provided by the patient.     Review of patient's allergies indicates:  No Known Allergies  Past Medical History:   Diagnosis Date    Migraine     PCOS (polycystic ovarian syndrome)     Recurrent epistaxis      Past Surgical History:   Procedure Laterality Date    ADENOIDECTOMY      got eggs frozen Bilateral 07/2018    TONSILLECTOMY       Family History   Problem Relation Age of Onset    Cancer Paternal Grandfather         breast     Hypertension Maternal Grandmother      Social History     Tobacco Use    Smoking status: Never Smoker    Smokeless tobacco: Never Used   Substance Use Topics    Alcohol use: No    Drug use: No     Review of Systems   Constitutional: Negative for fever.    HENT: Negative for congestion, sore throat and trouble swallowing.    Respiratory: Negative for cough and shortness of breath.    Cardiovascular: Negative for chest pain.   Gastrointestinal: Positive for abdominal pain, nausea and vomiting. Negative for constipation and diarrhea.   Genitourinary: Positive for flank pain. Negative for dysuria, frequency, pelvic pain, urgency, vaginal bleeding and vaginal discharge.   Musculoskeletal: Negative for back pain.   Skin: Negative for rash.   Neurological: Negative for headaches.   All other systems reviewed and are negative.      Physical Exam     Initial Vitals [03/27/19 1104]   BP Pulse Resp Temp SpO2   (!) 110/56 91 16 98.5 °F (36.9 °C) 99 %      MAP       --         Physical Exam    Nursing note and vitals reviewed.  Constitutional: She appears well-developed and well-nourished. She is not diaphoretic. No distress.   HENT:   Head: Normocephalic and atraumatic.   Nose: Nose normal.   Eyes: Conjunctivae and EOM are normal. Right eye exhibits no discharge. Left eye exhibits no discharge.   Neck: Normal range of motion. No tracheal deviation present. No JVD present.   Cardiovascular: Normal rate, regular rhythm and normal heart sounds. Exam reveals no friction rub.    No murmur heard.  Pulmonary/Chest: Breath sounds normal. No stridor. No respiratory distress. She has no wheezes. She has no rhonchi. She has no rales. She exhibits no tenderness.   Abdominal: Soft. She exhibits no distension. There is tenderness (suprapubic with lateralization to the RLQ). There is CVA tenderness (R). There is no rigidity, no rebound, no guarding, no tenderness at McBurney's point and negative Martinez's sign.   Musculoskeletal: Normal range of motion.   Neurological: She is alert and oriented to person, place, and time.   Skin: Skin is warm and dry. No rash and no abscess noted. No erythema. No pallor.         ED Course   Procedures  Labs Reviewed   URINALYSIS, REFLEX TO URINE CULTURE -  Abnormal; Notable for the following components:       Result Value    Ketones, UA Trace (*)     Urobilinogen, UA 4.0-6.0 (*)     Leukocytes, UA 2+ (*)     All other components within normal limits    Narrative:     Preferred Collection Type->Urine, Clean Catch   URINALYSIS MICROSCOPIC - Abnormal; Notable for the following components:    WBC, UA 11 (*)     Bacteria, UA Few (*)     Yeast, UA Rare (*)     All other components within normal limits    Narrative:     Preferred Collection Type->Urine, Clean Catch   CULTURE, URINE   POCT URINE PREGNANCY          Imaging Results    None          Medical Decision Making:   History:   Old Medical Records: I decided to obtain old medical records.  Initial Assessment:   24 yo F with urinary symptoms  Clinical Tests:   Lab Tests: Ordered and Reviewed  ED Management:  Patient's previously diagnosed cystitis that went untreated appears to have developed into pyelonephritis.  No urosepsis.  I carefully consider but have lower suspicion for ureteral stone; this possibility was discussed with patient and we both feel this is less likely.  Repeat abdominal exam reveals that she continues to have predominantly suprapubic tenderness with very minimal tenderness to the right lower quadrant; acute appendicitis is carefully considered but felt to be less likely. We specifically discuss return precautions for the less likely possibility of acute appendicitis.  Patient does not endorse symptoms or concerns for PID at this time.    Sent home with supportive care after Rocephin given in ED. Past urine culture sensitive to both Augmentin which she was started on yesterday, as well as Keflex which I will offer to patient as an alternative to avoid diarrhea. Advising PCP follow up. Strict return precautions discussed. Agreeable to plan.                        Clinical Impression:       ICD-10-CM ICD-9-CM   1. Acute pyelonephritis N10 590.10                                Mario Desai,  DIDI  03/27/19 132

## 2019-03-27 NOTE — ED TRIAGE NOTES
Pt. Reports vaginal discharge, right side flank pain, nausea, vomiting and lower right side abd pain. Denies any concerns for STD's. Pt. Reports she started taking amoxicillin on yesterday.

## 2019-03-29 LAB — BACTERIA UR CULT: NORMAL

## 2019-04-18 ENCOUNTER — PATIENT MESSAGE (OUTPATIENT)
Dept: FAMILY MEDICINE | Facility: CLINIC | Age: 24
End: 2019-04-18

## 2019-04-18 ENCOUNTER — TELEPHONE (OUTPATIENT)
Dept: FAMILY MEDICINE | Facility: CLINIC | Age: 24
End: 2019-04-18

## 2019-04-21 ENCOUNTER — PATIENT MESSAGE (OUTPATIENT)
Dept: FAMILY MEDICINE | Facility: CLINIC | Age: 24
End: 2019-04-21

## 2019-04-22 ENCOUNTER — CLINICAL SUPPORT (OUTPATIENT)
Dept: FAMILY MEDICINE | Facility: CLINIC | Age: 24
End: 2019-04-22
Payer: COMMERCIAL

## 2019-04-22 DIAGNOSIS — Z11.1 ENCOUNTER FOR PPD TEST: Primary | ICD-10-CM

## 2019-04-22 PROCEDURE — 86580 TB INTRADERMAL TEST: CPT | Mod: S$GLB,,, | Performed by: FAMILY MEDICINE

## 2019-04-22 PROCEDURE — 86580 POCT TB SKIN TEST: ICD-10-PCS | Mod: S$GLB,,, | Performed by: FAMILY MEDICINE

## 2019-04-22 NOTE — PROGRESS NOTES
PPD placed to right lower inner forearm.Wheal noted. Tolerated well. No adverse reaction noted. Patient advised to return on Wed after 8:35 am to have results read. Verbalized understanding.

## 2019-04-25 LAB
TB INDURATION - 48 HR READ: NORMAL MM
TB INDURATION - 72 HR READ: 0 MM
TB SKIN TEST - 48 HR READ: NORMAL
TB SKIN TEST - 72 HR READ: NEGATIVE

## 2019-05-16 ENCOUNTER — OFFICE VISIT (OUTPATIENT)
Dept: FAMILY MEDICINE | Facility: CLINIC | Age: 24
End: 2019-05-16
Payer: COMMERCIAL

## 2019-05-16 ENCOUNTER — LAB VISIT (OUTPATIENT)
Dept: LAB | Facility: HOSPITAL | Age: 24
End: 2019-05-16
Attending: NURSE PRACTITIONER
Payer: COMMERCIAL

## 2019-05-16 VITALS
HEART RATE: 65 BPM | DIASTOLIC BLOOD PRESSURE: 68 MMHG | RESPIRATION RATE: 16 BRPM | TEMPERATURE: 98 F | SYSTOLIC BLOOD PRESSURE: 122 MMHG | OXYGEN SATURATION: 97 % | WEIGHT: 130.5 LBS | BODY MASS INDEX: 28.15 KG/M2 | HEIGHT: 57 IN

## 2019-05-16 DIAGNOSIS — N92.6 IRREGULAR MENSTRUAL CYCLE: ICD-10-CM

## 2019-05-16 DIAGNOSIS — Z11.3 SCREEN FOR STD (SEXUALLY TRANSMITTED DISEASE): Primary | ICD-10-CM

## 2019-05-16 DIAGNOSIS — Z11.3 SCREEN FOR STD (SEXUALLY TRANSMITTED DISEASE): ICD-10-CM

## 2019-05-16 LAB
B-HCG UR QL: NEGATIVE
CTP QC/QA: YES
HCG INTACT+B SERPL-ACNC: 1.4 MIU/ML

## 2019-05-16 PROCEDURE — 87491 CHLMYD TRACH DNA AMP PROBE: CPT

## 2019-05-16 PROCEDURE — 99999 PR PBB SHADOW E&M-EST. PATIENT-LVL III: CPT | Mod: PBBFAC,,, | Performed by: PHYSICIAN ASSISTANT

## 2019-05-16 PROCEDURE — 86592 SYPHILIS TEST NON-TREP QUAL: CPT

## 2019-05-16 PROCEDURE — 99213 PR OFFICE/OUTPT VISIT, EST, LEVL III, 20-29 MIN: ICD-10-PCS | Mod: S$GLB,,, | Performed by: PHYSICIAN ASSISTANT

## 2019-05-16 PROCEDURE — 36415 COLL VENOUS BLD VENIPUNCTURE: CPT | Mod: PO

## 2019-05-16 PROCEDURE — 99213 OFFICE O/P EST LOW 20 MIN: CPT | Mod: S$GLB,,, | Performed by: PHYSICIAN ASSISTANT

## 2019-05-16 PROCEDURE — 99999 PR PBB SHADOW E&M-EST. PATIENT-LVL III: ICD-10-PCS | Mod: PBBFAC,,, | Performed by: PHYSICIAN ASSISTANT

## 2019-05-16 PROCEDURE — 3008F PR BODY MASS INDEX (BMI) DOCUMENTED: ICD-10-PCS | Mod: CPTII,S$GLB,, | Performed by: PHYSICIAN ASSISTANT

## 2019-05-16 PROCEDURE — 81025 POCT URINE PREGNANCY: ICD-10-PCS | Mod: S$GLB,,, | Performed by: PHYSICIAN ASSISTANT

## 2019-05-16 PROCEDURE — 81025 URINE PREGNANCY TEST: CPT | Mod: S$GLB,,, | Performed by: PHYSICIAN ASSISTANT

## 2019-05-16 PROCEDURE — 3008F BODY MASS INDEX DOCD: CPT | Mod: CPTII,S$GLB,, | Performed by: PHYSICIAN ASSISTANT

## 2019-05-16 PROCEDURE — 86703 HIV-1/HIV-2 1 RESULT ANTBDY: CPT

## 2019-05-16 PROCEDURE — 87510 GARDNER VAG DNA DIR PROBE: CPT

## 2019-05-16 PROCEDURE — 84702 CHORIONIC GONADOTROPIN TEST: CPT

## 2019-05-16 PROCEDURE — 87480 CANDIDA DNA DIR PROBE: CPT

## 2019-05-16 NOTE — PROGRESS NOTES
Subjective:       Patient ID: Kate Bailey is a 24 y.o. female.    Chief Complaint: Possible Pregnancy (irregular cycles) and STD CHECK    HPI: 25 yo female with PCOS presents for irregular cycles and STD check. Pt states her cycles have always been irregular, but have always last 7 days. She states last two months her cycles were 3-4 days long. They are also lighter. She has a new partner. She is trying to become pregnant. Taking pre-natals. She requesting STD check, she is asymptomatic.      Review of Systems   Genitourinary: Positive for menstrual problem. Negative for pelvic pain, urgency, vaginal bleeding, vaginal discharge and vaginal pain.       Objective:      Physical Exam   Constitutional: She appears well-developed and well-nourished.   HENT:   Head: Normocephalic and atraumatic.   Genitourinary: Vaginal discharge (white) found.   Skin: Skin is warm and dry.   Psychiatric: She has a normal mood and affect. Her behavior is normal.   Vitals reviewed.      Assessment:       1. Screen for STD (sexually transmitted disease)    2. Irregular menstrual cycle        Plan:        Kate was seen today for possible pregnancy and std check.    Diagnoses and all orders for this visit:    Screen for STD (sexually transmitted disease)  -     HIV 1/2 Ag/Ab (4th Gen); Future  -     RPR; Future  -     Vaginosis Screen by DNA Probe  -     C. trachomatis/N. gonorrhoeae by AMP DNA    Irregular menstrual cycle  -     POCT urine pregnancy  -     hCG, quantitative; Future

## 2019-05-17 ENCOUNTER — PATIENT MESSAGE (OUTPATIENT)
Dept: FAMILY MEDICINE | Facility: CLINIC | Age: 24
End: 2019-05-17

## 2019-05-17 LAB
BACTERIAL VAGINOSIS DNA: POSITIVE
CANDIDA GLABRATA DNA: NEGATIVE
CANDIDA KRUSEI DNA: NEGATIVE
CANDIDA RRNA VAG QL PROBE: POSITIVE
HIV 1+2 AB+HIV1 P24 AG SERPL QL IA: NEGATIVE
RPR SER QL: NORMAL
T VAGINALIS RRNA GENITAL QL PROBE: NEGATIVE

## 2019-05-18 LAB
C TRACH DNA SPEC QL NAA+PROBE: NOT DETECTED
N GONORRHOEA DNA SPEC QL NAA+PROBE: NOT DETECTED

## 2019-05-19 ENCOUNTER — PATIENT MESSAGE (OUTPATIENT)
Dept: FAMILY MEDICINE | Facility: CLINIC | Age: 24
End: 2019-05-19

## 2019-05-19 DIAGNOSIS — B96.89 BV (BACTERIAL VAGINOSIS): Primary | ICD-10-CM

## 2019-05-19 DIAGNOSIS — N76.0 BV (BACTERIAL VAGINOSIS): Primary | ICD-10-CM

## 2019-05-19 DIAGNOSIS — B37.31 VAGINAL YEAST INFECTION: ICD-10-CM

## 2019-05-20 ENCOUNTER — PATIENT MESSAGE (OUTPATIENT)
Dept: FAMILY MEDICINE | Facility: CLINIC | Age: 24
End: 2019-05-20

## 2019-05-20 RX ORDER — FLUCONAZOLE 150 MG/1
150 TABLET ORAL ONCE
Qty: 1 TABLET | Refills: 0 | Status: SHIPPED | OUTPATIENT
Start: 2019-05-20 | End: 2019-05-20

## 2019-05-20 RX ORDER — METRONIDAZOLE 500 MG/1
500 TABLET ORAL EVERY 12 HOURS
Qty: 14 TABLET | Refills: 0 | Status: SHIPPED | OUTPATIENT
Start: 2019-05-20 | End: 2019-05-27

## 2019-08-13 ENCOUNTER — PATIENT OUTREACH (OUTPATIENT)
Dept: ADMINISTRATIVE | Facility: OTHER | Age: 24
End: 2019-08-13

## 2019-08-16 ENCOUNTER — OFFICE VISIT (OUTPATIENT)
Dept: OBSTETRICS AND GYNECOLOGY | Facility: CLINIC | Age: 24
End: 2019-08-16
Payer: COMMERCIAL

## 2019-08-16 VITALS
BODY MASS INDEX: 27.66 KG/M2 | WEIGHT: 128.19 LBS | DIASTOLIC BLOOD PRESSURE: 60 MMHG | SYSTOLIC BLOOD PRESSURE: 100 MMHG | HEIGHT: 57 IN

## 2019-08-16 DIAGNOSIS — E28.2 POLYCYSTIC OVARIAN DISEASE: ICD-10-CM

## 2019-08-16 DIAGNOSIS — Z31.69 INFERTILITY COUNSELING: Primary | ICD-10-CM

## 2019-08-16 LAB
B-HCG UR QL: NEGATIVE
CTP QC/QA: YES

## 2019-08-16 PROCEDURE — 99999 PR PBB SHADOW E&M-EST. PATIENT-LVL III: CPT | Mod: PBBFAC,,, | Performed by: OBSTETRICS & GYNECOLOGY

## 2019-08-16 PROCEDURE — 3008F BODY MASS INDEX DOCD: CPT | Mod: CPTII,S$GLB,, | Performed by: OBSTETRICS & GYNECOLOGY

## 2019-08-16 PROCEDURE — 81025 URINE PREGNANCY TEST: CPT | Mod: S$GLB,,, | Performed by: OBSTETRICS & GYNECOLOGY

## 2019-08-16 PROCEDURE — 81025 POCT URINE PREGNANCY: ICD-10-PCS | Mod: S$GLB,,, | Performed by: OBSTETRICS & GYNECOLOGY

## 2019-08-16 PROCEDURE — 3008F PR BODY MASS INDEX (BMI) DOCUMENTED: ICD-10-PCS | Mod: CPTII,S$GLB,, | Performed by: OBSTETRICS & GYNECOLOGY

## 2019-08-16 PROCEDURE — 99214 PR OFFICE/OUTPT VISIT, EST, LEVL IV, 30-39 MIN: ICD-10-PCS | Mod: S$GLB,,, | Performed by: OBSTETRICS & GYNECOLOGY

## 2019-08-16 PROCEDURE — 99999 PR PBB SHADOW E&M-EST. PATIENT-LVL III: ICD-10-PCS | Mod: PBBFAC,,, | Performed by: OBSTETRICS & GYNECOLOGY

## 2019-08-16 PROCEDURE — 99214 OFFICE O/P EST MOD 30 MIN: CPT | Mod: S$GLB,,, | Performed by: OBSTETRICS & GYNECOLOGY

## 2019-08-16 RX ORDER — MEDROXYPROGESTERONE ACETATE 10 MG/1
TABLET ORAL
Qty: 14 TABLET | Refills: 0 | Status: SHIPPED | OUTPATIENT
Start: 2019-08-16

## 2019-08-16 RX ORDER — CLOMIPHENE CITRATE 50 MG/1
TABLET ORAL
Qty: 5 TABLET | Refills: 1 | Status: SHIPPED | OUTPATIENT
Start: 2019-08-16 | End: 2019-09-04 | Stop reason: SDUPTHER

## 2019-08-16 NOTE — PROGRESS NOTES
History & Physical  Gynecology      SUBJECTIVE:     Chief Complaint: Other (Infertility Consult)       History of Present Illness:  Ms. Bailey is a 23 y/o female who presents to clinic to discuss missed period. Patient has been trying to conceive for over a year. She has also been having irregular periods since was 14 y/o. She was diagnosed with PCOS years ago. She has tried cyclic provera to control her periods. Patient reports that she had a period with cyclic provera then had consecutive periods for the last 3-4 months without issues but she has not had a period for the last 2 months.    Of note, the patient has had ovarian stimulation and donated her eggs to her mother.        Review of patient's allergies indicates:   Allergen Reactions    Diflucan [fluconazole]      Facial Swelling       Past Medical History:   Diagnosis Date    Migraine     PCOS (polycystic ovarian syndrome)     Recurrent epistaxis      Past Surgical History:   Procedure Laterality Date    ADENOIDECTOMY      got eggs frozen Bilateral 2018    TONSILLECTOMY       OB History        0    Para   0    Term   0       0    AB   0    Living   0       SAB   0    TAB   0    Ectopic   0    Multiple   0    Live Births               Obstetric Comments   Gynhx;  13/Every 1-3 month/7-14 days  H/o PCOS  Pap neg 2015  H/o trichomonas           Family History   Problem Relation Age of Onset    Cancer Paternal Grandfather         breast     Hypertension Maternal Grandmother      Social History     Tobacco Use    Smoking status: Never Smoker    Smokeless tobacco: Never Used   Substance Use Topics    Alcohol use: No    Drug use: No       Current Outpatient Medications   Medication Sig    clomiPHENE (CLOMID) 50 mg tablet Take 1 tablet (50mg total) by mouth once daily on cycle days 3-7.    medroxyPROGESTERone (PROVERA) 10 MG tablet Take 1 tablet (10mg total) by mouth once daily for the first 14 days of the month.    PNV,calcium  72-iron-folic acid (PRENATAL VITAMIN PLUS LOW IRON) 27 mg iron- 1 mg Tab Take 1 tablet (1 each total) by mouth once daily.     No current facility-administered medications for this visit.        Review of Systems:  Review of Systems   Constitutional: Negative for chills and fever.   Respiratory: Negative for cough and wheezing.    Cardiovascular: Negative for chest pain and palpitations.   Gastrointestinal: Negative for abdominal pain, nausea and vomiting.   Genitourinary: Positive for menstrual problem. Negative for dysuria, frequency, hematuria, pelvic pain, vaginal bleeding, vaginal discharge and vaginal pain.        OBJECTIVE:     Physical Exam:  Physical Exam   Constitutional: She is oriented to person, place, and time. She appears well-developed and well-nourished.   Cardiovascular: Normal rate.   Pulmonary/Chest: Effort normal.   Abdominal: Soft.   Neurological: She is alert and oriented to person, place, and time.   Skin: Skin is warm and dry.   Psychiatric: She has a normal mood and affect.   Nursing note and vitals reviewed.    ASSESSMENT:       ICD-10-CM ICD-9-CM    1. Infertility counseling Z31.69 V26.49 medroxyPROGESTERone (PROVERA) 10 MG tablet      PNV,calcium 72-iron-folic acid (PRENATAL VITAMIN PLUS LOW IRON) 27 mg iron- 1 mg Tab      clomiPHENE (CLOMID) 50 mg tablet   2. Polycystic ovarian disease E28.2 256.4 POCT urine pregnancy      medroxyPROGESTERone (PROVERA) 10 MG tablet      clomiPHENE (CLOMID) 50 mg tablet     Plan:      Kate was seen today for other.    Diagnoses and all orders for this visit:    Infertility counseling  -     medroxyPROGESTERone (PROVERA) 10 MG tablet; Take 1 tablet (10mg total) by mouth once daily for the first 14 days of the month.  -     PNV,calcium 72-iron-folic acid (PRENATAL VITAMIN PLUS LOW IRON) 27 mg iron- 1 mg Tab; Take 1 tablet (1 each total) by mouth once daily.  -     clomiPHENE (CLOMID) 50 mg tablet; Take 1 tablet (50mg total) by mouth once daily on cycle  days 3-7.    Polycystic ovarian disease  -     POCT urine pregnancy  -     medroxyPROGESTERone (PROVERA) 10 MG tablet; Take 1 tablet (10mg total) by mouth once daily for the first 14 days of the month.  -     clomiPHENE (CLOMID) 50 mg tablet; Take 1 tablet (50mg total) by mouth once daily on cycle days 3-7.        Orders Placed This Encounter   Procedures    POCT urine pregnancy       Follow up in about 3 months (around 11/16/2019) for f/u infertility.    Counseling time: 15 minutes    Chevy Noguera

## 2019-09-03 ENCOUNTER — PATIENT MESSAGE (OUTPATIENT)
Dept: OBSTETRICS AND GYNECOLOGY | Facility: CLINIC | Age: 24
End: 2019-09-03

## 2019-09-04 DIAGNOSIS — E28.2 POLYCYSTIC OVARIAN DISEASE: ICD-10-CM

## 2019-09-04 DIAGNOSIS — Z31.69 INFERTILITY COUNSELING: ICD-10-CM

## 2019-09-04 RX ORDER — CLOMIPHENE CITRATE 50 MG/1
TABLET ORAL
Qty: 10 TABLET | Refills: 1 | Status: SHIPPED | OUTPATIENT
Start: 2019-09-04

## 2019-09-13 ENCOUNTER — PATIENT MESSAGE (OUTPATIENT)
Dept: OBSTETRICS AND GYNECOLOGY | Facility: CLINIC | Age: 24
End: 2019-09-13

## 2019-09-16 ENCOUNTER — IMMUNIZATION (OUTPATIENT)
Dept: FAMILY MEDICINE | Facility: CLINIC | Age: 24
End: 2019-09-16
Payer: COMMERCIAL

## 2019-09-16 DIAGNOSIS — Z23 NEED FOR INFLUENZA VACCINATION: Primary | ICD-10-CM

## 2019-09-16 PROCEDURE — 90471 FLU VACCINE (QUAD) GREATER THAN OR EQUAL TO 3YO PRESERVATIVE FREE IM: ICD-10-PCS | Mod: S$GLB,,, | Performed by: FAMILY MEDICINE

## 2019-09-16 PROCEDURE — 90471 IMMUNIZATION ADMIN: CPT | Mod: S$GLB,,, | Performed by: FAMILY MEDICINE

## 2019-09-16 PROCEDURE — 90686 FLU VACCINE (QUAD) GREATER THAN OR EQUAL TO 3YO PRESERVATIVE FREE IM: ICD-10-PCS | Mod: S$GLB,,, | Performed by: FAMILY MEDICINE

## 2019-09-16 PROCEDURE — 90686 IIV4 VACC NO PRSV 0.5 ML IM: CPT | Mod: S$GLB,,, | Performed by: FAMILY MEDICINE

## 2019-09-16 NOTE — PROGRESS NOTES
Flu vaccine administered IM to left deltoid. VIS form given. Tolerated well. No adverse reaction noted.

## 2019-11-05 ENCOUNTER — HOSPITAL ENCOUNTER (EMERGENCY)
Facility: HOSPITAL | Age: 24
Discharge: HOME | End: 2019-11-05
Attending: EMERGENCY MEDICINE
Payer: COMMERCIAL

## 2019-11-05 VITALS
HEART RATE: 111 BPM | TEMPERATURE: 100 F | DIASTOLIC BLOOD PRESSURE: 56 MMHG | SYSTOLIC BLOOD PRESSURE: 108 MMHG | RESPIRATION RATE: 16 BRPM | WEIGHT: 130 LBS | BODY MASS INDEX: 28.05 KG/M2 | HEIGHT: 57 IN | OXYGEN SATURATION: 97 %

## 2019-11-05 DIAGNOSIS — R55 NEAR SYNCOPE: ICD-10-CM

## 2019-11-05 DIAGNOSIS — N39.0 URINARY TRACT INFECTION WITHOUT HEMATURIA, SITE UNSPECIFIED: Primary | ICD-10-CM

## 2019-11-05 DIAGNOSIS — R40.20 LOSS OF CONSCIOUSNESS: ICD-10-CM

## 2019-11-05 LAB
ALBUMIN SERPL BCP-MCNC: 4.2 G/DL (ref 3.5–5.2)
ALP SERPL-CCNC: 71 U/L (ref 55–135)
ALT SERPL W/O P-5'-P-CCNC: 18 U/L (ref 10–44)
ANION GAP SERPL CALC-SCNC: 8 MMOL/L (ref 8–16)
AST SERPL-CCNC: 29 U/L (ref 10–40)
B-HCG UR QL: NEGATIVE
BACTERIA #/AREA URNS HPF: ABNORMAL /HPF
BASOPHILS # BLD AUTO: 0.02 K/UL (ref 0–0.2)
BASOPHILS NFR BLD: 0.1 % (ref 0–1.9)
BILIRUB SERPL-MCNC: 1 MG/DL (ref 0.1–1)
BILIRUB UR QL STRIP: NEGATIVE
BUN SERPL-MCNC: 7 MG/DL (ref 6–20)
CALCIUM SERPL-MCNC: 10.1 MG/DL (ref 8.7–10.5)
CHLORIDE SERPL-SCNC: 101 MMOL/L (ref 95–110)
CLARITY UR: ABNORMAL
CO2 SERPL-SCNC: 25 MMOL/L (ref 23–29)
COLOR UR: YELLOW
CREAT SERPL-MCNC: 0.9 MG/DL (ref 0.5–1.4)
CTP QC/QA: YES
DIFFERENTIAL METHOD: ABNORMAL
EOSINOPHIL # BLD AUTO: 0 K/UL (ref 0–0.5)
EOSINOPHIL NFR BLD: 0 % (ref 0–8)
ERYTHROCYTE [DISTWIDTH] IN BLOOD BY AUTOMATED COUNT: 12.2 % (ref 11.5–14.5)
EST. GFR  (AFRICAN AMERICAN): >60 ML/MIN/1.73 M^2
EST. GFR  (NON AFRICAN AMERICAN): >60 ML/MIN/1.73 M^2
GLUCOSE SERPL-MCNC: 95 MG/DL (ref 70–110)
GLUCOSE UR QL STRIP: ABNORMAL
HCT VFR BLD AUTO: 43.5 % (ref 37–48.5)
HGB BLD-MCNC: 14.3 G/DL (ref 12–16)
HGB UR QL STRIP: ABNORMAL
HYALINE CASTS #/AREA URNS LPF: 0 /LPF
IMM GRANULOCYTES # BLD AUTO: 0.09 K/UL (ref 0–0.04)
IMM GRANULOCYTES NFR BLD AUTO: 0.5 % (ref 0–0.5)
KETONES UR QL STRIP: NEGATIVE
LEUKOCYTE ESTERASE UR QL STRIP: ABNORMAL
LYMPHOCYTES # BLD AUTO: 1.2 K/UL (ref 1–4.8)
LYMPHOCYTES NFR BLD: 6.9 % (ref 18–48)
MCH RBC QN AUTO: 31 PG (ref 27–31)
MCHC RBC AUTO-ENTMCNC: 32.9 G/DL (ref 32–36)
MCV RBC AUTO: 94 FL (ref 82–98)
MICROSCOPIC COMMENT: ABNORMAL
MONOCYTES # BLD AUTO: 1.6 K/UL (ref 0.3–1)
MONOCYTES NFR BLD: 8.7 % (ref 4–15)
NEUTROPHILS # BLD AUTO: 14.9 K/UL (ref 1.8–7.7)
NEUTROPHILS NFR BLD: 83.8 % (ref 38–73)
NITRITE UR QL STRIP: NEGATIVE
NRBC BLD-RTO: 0 /100 WBC
PH UR STRIP: 6 [PH] (ref 5–8)
PLATELET # BLD AUTO: 243 K/UL (ref 150–350)
PMV BLD AUTO: 10.4 FL (ref 9.2–12.9)
POTASSIUM SERPL-SCNC: 3.9 MMOL/L (ref 3.5–5.1)
PROT SERPL-MCNC: 8.4 G/DL (ref 6–8.4)
PROT UR QL STRIP: ABNORMAL
RBC # BLD AUTO: 4.62 M/UL (ref 4–5.4)
RBC #/AREA URNS HPF: 1 /HPF (ref 0–4)
SODIUM SERPL-SCNC: 134 MMOL/L (ref 136–145)
SP GR UR STRIP: 1.01 (ref 1–1.03)
SQUAMOUS #/AREA URNS HPF: 3 /HPF
T4 FREE SERPL-MCNC: 0.81 NG/DL (ref 0.71–1.51)
TSH SERPL DL<=0.005 MIU/L-ACNC: 0.13 UIU/ML (ref 0.4–4)
URN SPEC COLLECT METH UR: ABNORMAL
UROBILINOGEN UR STRIP-ACNC: NEGATIVE EU/DL
WBC # BLD AUTO: 17.83 K/UL (ref 3.9–12.7)
WBC #/AREA URNS HPF: >100 /HPF (ref 0–5)
WBC CLUMPS URNS QL MICRO: ABNORMAL

## 2019-11-05 PROCEDURE — 84443 ASSAY THYROID STIM HORMONE: CPT

## 2019-11-05 PROCEDURE — 87088 URINE BACTERIA CULTURE: CPT

## 2019-11-05 PROCEDURE — 99285 EMERGENCY DEPT VISIT HI MDM: CPT | Mod: 25

## 2019-11-05 PROCEDURE — 96360 HYDRATION IV INFUSION INIT: CPT

## 2019-11-05 PROCEDURE — 63600175 PHARM REV CODE 636 W HCPCS: Performed by: EMERGENCY MEDICINE

## 2019-11-05 PROCEDURE — 84439 ASSAY OF FREE THYROXINE: CPT

## 2019-11-05 PROCEDURE — 25000003 PHARM REV CODE 250: Performed by: EMERGENCY MEDICINE

## 2019-11-05 PROCEDURE — 81025 URINE PREGNANCY TEST: CPT | Performed by: NURSE PRACTITIONER

## 2019-11-05 PROCEDURE — 85025 COMPLETE CBC W/AUTO DIFF WBC: CPT

## 2019-11-05 PROCEDURE — 93005 ELECTROCARDIOGRAM TRACING: CPT

## 2019-11-05 PROCEDURE — 80053 COMPREHEN METABOLIC PANEL: CPT

## 2019-11-05 PROCEDURE — 87077 CULTURE AEROBIC IDENTIFY: CPT

## 2019-11-05 PROCEDURE — 93010 EKG 12-LEAD: ICD-10-PCS | Mod: 76,,, | Performed by: INTERNAL MEDICINE

## 2019-11-05 PROCEDURE — 93010 ELECTROCARDIOGRAM REPORT: CPT | Mod: ,,, | Performed by: INTERNAL MEDICINE

## 2019-11-05 PROCEDURE — 87186 SC STD MICRODIL/AGAR DIL: CPT

## 2019-11-05 PROCEDURE — 93010 ELECTROCARDIOGRAM REPORT: CPT | Mod: 76,,, | Performed by: INTERNAL MEDICINE

## 2019-11-05 PROCEDURE — 96361 HYDRATE IV INFUSION ADD-ON: CPT

## 2019-11-05 PROCEDURE — 81000 URINALYSIS NONAUTO W/SCOPE: CPT

## 2019-11-05 PROCEDURE — 87086 URINE CULTURE/COLONY COUNT: CPT

## 2019-11-05 RX ORDER — CIPROFLOXACIN 500 MG/1
500 TABLET ORAL 2 TIMES DAILY
Qty: 20 TABLET | Refills: 0 | Status: SHIPPED | OUTPATIENT
Start: 2019-11-05 | End: 2019-11-15

## 2019-11-05 RX ORDER — CIPROFLOXACIN 500 MG/1
500 TABLET ORAL
Status: COMPLETED | OUTPATIENT
Start: 2019-11-05 | End: 2019-11-05

## 2019-11-05 RX ORDER — NITROFURANTOIN 25; 75 MG/1; MG/1
100 CAPSULE ORAL ONCE
Status: COMPLETED | OUTPATIENT
Start: 2019-11-05 | End: 2019-11-05

## 2019-11-05 RX ORDER — ONDANSETRON 4 MG/1
4 TABLET, ORALLY DISINTEGRATING ORAL
Status: COMPLETED | OUTPATIENT
Start: 2019-11-05 | End: 2019-11-05

## 2019-11-05 RX ORDER — CEPHALEXIN 500 MG/1
500 CAPSULE ORAL 4 TIMES DAILY
Qty: 40 CAPSULE | Refills: 0 | Status: SHIPPED | OUTPATIENT
Start: 2019-11-05 | End: 2019-11-15

## 2019-11-05 RX ORDER — ONDANSETRON 4 MG/1
4 TABLET, FILM COATED ORAL EVERY 6 HOURS
Qty: 12 TABLET | Refills: 0 | Status: SHIPPED | OUTPATIENT
Start: 2019-11-05

## 2019-11-05 RX ADMIN — SODIUM CHLORIDE 2000 ML: 0.9 INJECTION, SOLUTION INTRAVENOUS at 04:11

## 2019-11-05 RX ADMIN — CIPROFLOXACIN HYDROCHLORIDE 500 MG: 500 TABLET, FILM COATED ORAL at 04:11

## 2019-11-05 RX ADMIN — ONDANSETRON 4 MG: 4 TABLET, ORALLY DISINTEGRATING ORAL at 04:11

## 2019-11-05 RX ADMIN — NITROFURANTOIN (MONOHYDRATE/MACROCRYSTALS) 100 MG: 75; 25 CAPSULE ORAL at 04:11

## 2019-11-05 NOTE — ED PROVIDER NOTES
"Encounter Date: 11/5/2019       History     Chief Complaint   Patient presents with    Loss of Consciousness     " I fainted at work, I have a UTI and a fever".      24 y.o. female Past Medical History:  No date: Migraine  No date: PCOS (polycystic ovarian syndrome)  No date: Recurrent epistaxis     Endorses urinary frequency/urgency/sensation of needing to urinate and only having small amts come out/suprapubic discomfort for the last few days. Notes subj f/c and sweats at home. Endorses orthostasis with near syncope today. No loc, did not sensation of rapid HR. Denies n/v, diarrhea/headache/sore throat/nasal congestion or other symptoms.        Review of patient's allergies indicates:   Allergen Reactions    Diflucan [fluconazole]      Facial Swelling     Past Medical History:   Diagnosis Date    Migraine     PCOS (polycystic ovarian syndrome)     Recurrent epistaxis      Past Surgical History:   Procedure Laterality Date    ADENOIDECTOMY      got eggs frozen Bilateral 07/2018    TONSILLECTOMY       Family History   Problem Relation Age of Onset    Cancer Paternal Grandfather         breast     Hypertension Maternal Grandmother      Social History     Tobacco Use    Smoking status: Never Smoker    Smokeless tobacco: Never Used   Substance Use Topics    Alcohol use: No    Drug use: No     Review of Systems   Constitutional: Negative for fever.   HENT: Negative for sore throat.    Respiratory: Negative for shortness of breath.    Cardiovascular: Negative for chest pain.   Gastrointestinal: Negative for nausea.   Genitourinary: Positive for dysuria.   Musculoskeletal: Negative for back pain.   Skin: Negative for rash.   Neurological: Negative for weakness.   Hematological: Does not bruise/bleed easily.       Physical Exam     Initial Vitals [11/05/19 1404]   BP Pulse Resp Temp SpO2   121/70 107 18 98.7 °F (37.1 °C) 97 %      MAP       --         Physical Exam    Nursing note and vitals " reviewed.  Constitutional: She appears well-developed and well-nourished.   HENT:   Head: Normocephalic and atraumatic.   Eyes: Conjunctivae and EOM are normal. Pupils are equal, round, and reactive to light.   Neck: Normal range of motion.   Cardiovascular: Normal rate, regular rhythm and normal heart sounds.   Pulmonary/Chest: Breath sounds normal. No respiratory distress.   Abdominal: Soft. She exhibits no distension. There is no tenderness. There is no rebound.   Musculoskeletal: Normal range of motion.   Neurological: She is alert. No cranial nerve deficit. GCS score is 15. GCS eye subscore is 4. GCS verbal subscore is 5. GCS motor subscore is 6.   Skin: Skin is warm and dry.   Psychiatric: She has a normal mood and affect. Thought content normal.         ED Course   Procedures  Labs Reviewed   URINALYSIS, REFLEX TO URINE CULTURE   POCT URINE PREGNANCY     EKG Readings: (Independently Interpreted)   Hr 106, nl axis/intervals, no letty/twi, non acute, no stemi.        Imaging Results    None          Medical Decision Making:   Initial Assessment:   Young girl with near syncope  Screening labs, ua/upt, orthostats, ivf, reassess                         pt is not clinically septic. She is feeling much better after interventions.  Pt afebrile. Labs reassuring. Wbc noted. Have started her on cipro/keflex.      Clinical Impression:       ICD-10-CM ICD-9-CM   1. Urinary tract infection without hematuria, site unspecified N39.0 599.0   2. Loss of consciousness R40.20 780.09   3. Near syncope R55 780.2                             Steff King MD  11/05/19 5167

## 2019-11-05 NOTE — ED TRIAGE NOTES
Pt presents to the ED with c/o LOC. Pt states she fainted at work. Denies hitting her head. States currently she is running a fever, body aches, chills, nausea/vomiting. Denies any abdominal pain or diarrhea. Pt currently c/o constipation. Denies any chest pain or SOB currently. VSS.

## 2019-11-06 NOTE — DISCHARGE INSTRUCTIONS
Thank you for coming to our Emergency Department today. It is important to remember that some problems are difficult to diagnose and may not be found during your first visit. Be sure to follow up with your primary care doctor and review any labs/imaging that was performed with them. If you do not have a primary care doctor, you may contact the one listed on your discharge paperwork or you may also call the [facility] Appointment Desk at [phone] to schedule an appointment with one.     All medications may potentially have side effects and it is impossible to predict which medications may give you side effects. If you feel that you are having a negative effect of any medication you should immediately stop taking them and seek medical attention.    Return to the ER with any questions/concerns, new/concerning symptoms, worsening or failure to improve. Do not drive or make any important decisions for 24 hours if you have received any pain medications, sedatives or mood altering drugs during your ER visit.

## 2019-11-07 LAB — BACTERIA UR CULT: ABNORMAL

## 2019-12-11 ENCOUNTER — PATIENT MESSAGE (OUTPATIENT)
Dept: OBSTETRICS AND GYNECOLOGY | Facility: CLINIC | Age: 24
End: 2019-12-11

## 2019-12-24 ENCOUNTER — PATIENT OUTREACH (OUTPATIENT)
Dept: ADMINISTRATIVE | Facility: OTHER | Age: 24
End: 2019-12-24

## 2019-12-27 ENCOUNTER — OFFICE VISIT (OUTPATIENT)
Dept: OBSTETRICS AND GYNECOLOGY | Facility: CLINIC | Age: 24
End: 2019-12-27
Payer: COMMERCIAL

## 2019-12-27 VITALS — DIASTOLIC BLOOD PRESSURE: 63 MMHG | SYSTOLIC BLOOD PRESSURE: 115 MMHG | BODY MASS INDEX: 28.46 KG/M2 | WEIGHT: 131.5 LBS

## 2019-12-27 DIAGNOSIS — N97.0 INFERTILITY ASSOCIATED WITH ANOVULATION: Primary | ICD-10-CM

## 2019-12-27 PROCEDURE — 3008F BODY MASS INDEX DOCD: CPT | Mod: CPTII,S$GLB,, | Performed by: OBSTETRICS & GYNECOLOGY

## 2019-12-27 PROCEDURE — 99214 OFFICE O/P EST MOD 30 MIN: CPT | Mod: S$GLB,,, | Performed by: OBSTETRICS & GYNECOLOGY

## 2019-12-27 PROCEDURE — 99214 PR OFFICE/OUTPT VISIT, EST, LEVL IV, 30-39 MIN: ICD-10-PCS | Mod: S$GLB,,, | Performed by: OBSTETRICS & GYNECOLOGY

## 2019-12-27 PROCEDURE — 3008F PR BODY MASS INDEX (BMI) DOCUMENTED: ICD-10-PCS | Mod: CPTII,S$GLB,, | Performed by: OBSTETRICS & GYNECOLOGY

## 2019-12-27 PROCEDURE — 99999 PR PBB SHADOW E&M-EST. PATIENT-LVL II: ICD-10-PCS | Mod: PBBFAC,,, | Performed by: OBSTETRICS & GYNECOLOGY

## 2019-12-27 PROCEDURE — 99999 PR PBB SHADOW E&M-EST. PATIENT-LVL II: CPT | Mod: PBBFAC,,, | Performed by: OBSTETRICS & GYNECOLOGY

## 2020-01-03 NOTE — PROGRESS NOTES
History & Physical  Gynecology      SUBJECTIVE:     Chief Complaint: Follow-up       History of Present Illness:  Ms. Bailey is a 23 y/o female who presents to clinic to discuss infertility. During her last visit, she was started on provera and clomid. She reports that her periods had been regular and monthly with provera. Patient has been trying to conceive for over a year. She has also been having irregular periods since was 12 y/o. She was diagnosed with PCOS years ago.     Patient reports that she had a period with cyclic provera then had consecutive periods for the last 3-4 months without issues but she has not had a period for the last 2 months. The patient has had ovarian stimulation and donated her eggs to her mother.      The patient reports that she has been taking the Clomid as directed but with no success. She reports that she is ready to move forth with other treatments.     Review of patient's allergies indicates:   Allergen Reactions    Diflucan [fluconazole]      Facial Swelling       Past Medical History:   Diagnosis Date    Migraine     PCOS (polycystic ovarian syndrome)     Recurrent epistaxis      Past Surgical History:   Procedure Laterality Date    ADENOIDECTOMY      got eggs frozen Bilateral 2018    TONSILLECTOMY       OB History        0    Para   0    Term   0       0    AB   0    Living   0       SAB   0    TAB   0    Ectopic   0    Multiple   0    Live Births               Obstetric Comments   Gynhx;  13/Every 1-3 month/7-14 days  H/o PCOS  Pap neg 2015  H/o trichomonas           Family History   Problem Relation Age of Onset    Cancer Paternal Grandfather         breast     Hypertension Maternal Grandmother      Social History     Tobacco Use    Smoking status: Never Smoker    Smokeless tobacco: Never Used   Substance Use Topics    Alcohol use: No    Drug use: No       Current Outpatient Medications   Medication Sig    clomiPHENE (CLOMID) 50 mg tablet  Take 2 tablets (100mg total) by mouth once daily on cycle days 3-7. (Patient not taking: Reported on 12/27/2019)    medroxyPROGESTERone (PROVERA) 10 MG tablet Take 1 tablet (10mg total) by mouth once daily for the first 14 days of the month. (Patient not taking: Reported on 12/27/2019)    ondansetron (ZOFRAN) 4 MG tablet Take 1 tablet (4 mg total) by mouth every 6 (six) hours. (Patient not taking: Reported on 12/27/2019)    PNV,calcium 72-iron-folic acid (PRENATAL VITAMIN PLUS LOW IRON) 27 mg iron- 1 mg Tab Take 1 tablet (1 each total) by mouth once daily. (Patient not taking: Reported on 12/27/2019)     No current facility-administered medications for this visit.      Review of Systems:  Review of Systems   Constitutional: Negative for chills and fever.   Respiratory: Negative for cough and wheezing.    Cardiovascular: Negative for chest pain and palpitations.   Gastrointestinal: Negative for abdominal pain, nausea and vomiting.   Genitourinary: Positive for menstrual problem. Negative for dysuria, frequency, hematuria, pelvic pain, vaginal bleeding, vaginal discharge and vaginal pain.        OBJECTIVE:     Physical Exam:  Physical Exam   Constitutional: She is oriented to person, place, and time. She appears well-developed and well-nourished.   Cardiovascular: Normal rate.   Pulmonary/Chest: Effort normal.   Neurological: She is alert and oriented to person, place, and time.   Skin: Skin is warm and dry.   Psychiatric: She has a normal mood and affect.   Nursing note and vitals reviewed.      ASSESSMENT:       ICD-10-CM ICD-9-CM    1. Infertility associated with anovulation N97.0 628.0         Plan:      Kate was seen today for follow-up.    Diagnoses and all orders for this visit:    Infertility associated with anovulation  - Provera and Clomid given to patient at her last visit in 08/2019  - Period were regular and monthly using provera and after patient stopped provera per patient  - Patient did not do Day  21 progesterone  - Patient has done 3 cycles of clomid and is now ready to be seen by [facility] specialist  - Extensive conversation about infertility. Discussed cycles with patient and identified that patient has irregular cycles that have been controlled by provera. Discussed ovulatory, tubal and male factor infertility  - Discussed timed intercourse with patient  - Low risk factor for tubal infertility.          No orders of the defined types were placed in this encounter.      Follow up if symptoms worsen or fail to improve.    Counseling time: 30 minutes    Chevy Noguera

## 2020-01-03 NOTE — PATIENT INSTRUCTIONS
Understanding Fertility Problems: Obstacles to Pregnancy    The path to pregnancy is not always smooth. Age, hormones, and the health of your reproductive system can all become obstacles to pregnancy. Keep in mind that its common for both partners to have factors that decrease fertility. And sometimes the cause is unknown. So try to avoid feeling guilty or placing blame if you are having trouble getting pregnant. Instead, share the challenge and support each other.  Older age  Age is a major factor in female fertility. As a woman ages, the quantity and quality of her eggs decline. This becomes apparent by around age 35 and speeds up after the age of 40. A man makes sperm throughout his life. So age is not as much of a factor. However, many problems can affect sperm regardless of a mans age.  Problems with sperm  Health or lifestyle factors can lower the number of sperm (sperm count) in a mans ejaculate. Even if the sperm count is normal, a man may produce sperm that doesn't function properly. These may not be able to make the journey through the womans reproductive tract. Or, they may not be able to fertilize an egg.  Problems with ovulation  Ovulation problems are a common cause of infertility. In some cases, an imbalance of hormones can prevent eggs from maturing. Hormone problems can also prevent eggs from being released by the ovaries.  Problems with fertilization  A blockage in the male or female reproductive tract can prevent fertilization. Or, sperm may be unable to swim through the cervical mucus. And even if sperm do reach an egg, they may not be able to penetrate the eggs covering.  Problems with implantation  After fertilization, an embryo may not be able to implant in the uterus. This is often due to problems with the lining of the uterus. It can also result from problems with the size or shape of the uterus.  Date Last Reviewed: 5/21/2015  © 7647-0658 [employer]. [address] [place] [address]  Road, [place] [address]. All rights reserved. This information is not intended as a substitute for professional medical care. Always follow your healthcare professional's instructions.

## 2020-03-08 ENCOUNTER — HOSPITAL ENCOUNTER (EMERGENCY)
Facility: HOSPITAL | Age: 25
Discharge: HOME | End: 2020-03-08
Attending: EMERGENCY MEDICINE
Payer: COMMERCIAL

## 2020-03-08 VITALS
SYSTOLIC BLOOD PRESSURE: 104 MMHG | HEART RATE: 72 BPM | DIASTOLIC BLOOD PRESSURE: 57 MMHG | TEMPERATURE: 99 F | BODY MASS INDEX: 28.05 KG/M2 | RESPIRATION RATE: 15 BRPM | OXYGEN SATURATION: 98 % | WEIGHT: 130 LBS | HEIGHT: 57 IN

## 2020-03-08 DIAGNOSIS — L24.3 IRRITANT CONTACT DERMATITIS DUE TO COSMETICS: Primary | ICD-10-CM

## 2020-03-08 LAB
B-HCG UR QL: NEGATIVE
CTP QC/QA: YES

## 2020-03-08 PROCEDURE — 81025 URINE PREGNANCY TEST: CPT | Performed by: PHYSICIAN ASSISTANT

## 2020-03-08 PROCEDURE — 99284 PR EMERGENCY DEPT VISIT,LEVEL IV: ICD-10-PCS | Mod: ,,, | Performed by: PHYSICIAN ASSISTANT

## 2020-03-08 PROCEDURE — 99283 EMERGENCY DEPT VISIT LOW MDM: CPT

## 2020-03-08 PROCEDURE — 99284 EMERGENCY DEPT VISIT MOD MDM: CPT | Mod: ,,, | Performed by: PHYSICIAN ASSISTANT

## 2020-03-08 RX ORDER — TRIAMCINOLONE ACETONIDE 0.25 MG/G
OINTMENT TOPICAL 2 TIMES DAILY
Qty: 80 G | Refills: 0 | Status: SHIPPED | OUTPATIENT
Start: 2020-03-08 | End: 2020-03-18

## 2020-03-08 NOTE — ED NOTES
The patient came to the ER today with c/o a skin rash/reaction to roll on perfume that she began using last week. At first, the patient noticed a tingling and red dots on her neck. Over the last week, the rash has spread around her neck.

## 2020-03-08 NOTE — ED PROVIDER NOTES
"Encounter Date: 3/8/2020       History     Chief Complaint   Patient presents with    Skin Problem     Reports using roll on perfume a week ago and reports burning around neck     Ms Bailey is a 24yoF who presents for rash; no pertinent PMHx per patient or chart review. Patient reports using a roll-on perfume for the first time two days ago when, 30 min after application, she developed pruritic, stinging red dots in distribution of application, around the side of her neck. She washed it off with water with continuation of stinging sensation. Over the past two days, reports pruritic, stinging rash of "rough skin" around her neck. Not associated with N/V, SOB, swelling, fever/chills, other rash, arthralgias, decreased UOP. She believes the perfume was oil based. Has never tried this perfume before. Denies new lotions, detergents, other novel introductions. She has been using vaseline.   The patients available PMH, PSH, Social History, medications, allergies, and triage vital signs were reviewed just prior to their medical evaluation.  A ten point review of systems was completed and is negative except as documented above.  Patient denies any other acute medical complaint.    Please be advised this text was dictated with [email or web address] software and may contain errors due to translation.           Review of patient's allergies indicates:   Allergen Reactions    Diflucan [fluconazole]      Facial Swelling     Past Medical History:   Diagnosis Date    Migraine     PCOS (polycystic ovarian syndrome)     Recurrent epistaxis      Past Surgical History:   Procedure Laterality Date    ADENOIDECTOMY      got eggs frozen Bilateral 07/2018    TONSILLECTOMY       Family History   Problem Relation Age of Onset    Cancer Paternal Grandfather         breast     Hypertension Maternal Grandmother      Social History     Tobacco Use    Smoking status: Never Smoker    Smokeless tobacco: Never Used   Substance Use Topics    Alcohol " use: No    Drug use: No     Review of Systems   Constitutional: Negative for chills and fever.   HENT: Negative for trouble swallowing and voice change.    Eyes: Negative for visual disturbance.   Respiratory: Negative for cough and shortness of breath.    Cardiovascular: Negative for chest pain.   Gastrointestinal: Negative for abdominal pain, nausea and vomiting.   Genitourinary: Negative for decreased urine volume.   Musculoskeletal: Negative for neck pain and neck stiffness.   Skin: Positive for rash. Negative for pallor.   Neurological: Negative for headaches.   Psychiatric/Behavioral: Negative for confusion.       Physical Exam     Initial Vitals [03/08/20 1335]   BP Pulse Resp Temp SpO2   (!) 104/57 72 15 99 °F (37.2 °C) 98 %      MAP       --         Physical Exam    Vitals reviewed.  Constitutional: She appears well-developed and well-nourished. She is not diaphoretic. No distress.   HENT:   Head: Normocephalic and atraumatic.   Right Ear: External ear normal.   Left Ear: External ear normal.   Nose: Nose normal.   Mouth/Throat: Oropharynx is clear and moist. No oropharyngeal exudate.   Mucus membranes clear   Eyes: Conjunctivae and EOM are normal. Pupils are equal, round, and reactive to light.   Neck: Normal range of motion. Neck supple.   Cardiovascular: Normal rate, regular rhythm and intact distal pulses.   Pulmonary/Chest: Breath sounds normal. No stridor. No respiratory distress. She has no wheezes. She has no rhonchi. She has no rales.   Abdominal: Soft. Bowel sounds are normal. There is no rebound.   Musculoskeletal: She exhibits no edema.   Lymphadenopathy:     She has no cervical adenopathy.   Neurological: She is alert and oriented to person, place, and time. She has normal strength. No cranial nerve deficit.   Skin: Skin is warm and dry. Capillary refill takes less than 2 seconds. Rash noted. There is erythema.   Mild lichenification of skin that is hyperpigmented in distribution of perfume  use. Mild stinging, some pruritis. No surrounding erythema, edema, induration or crusting to suggest 2ry bacterial infection    Located posterior and sides of neck    Remainder of skin exam unremarkable   Psychiatric: She has a normal mood and affect. Her behavior is normal. Judgment and thought content normal.             ED Course   Procedures  Labs Reviewed   POCT URINE PREGNANCY          Imaging Results    None          Medical Decision Making:   History:   Old Medical Records: I decided to obtain old medical records.  Old Records Summarized: records from clinic visits.  Initial Assessment:   Patient presents with rash eruption after using roll on perfume. No other system involvement, VSS, afebrile  Differential Diagnosis:   DDx includes irritant vs allergic dermatitis. Physical exam and history taking lower clinical suspicion for EM/SJS, acanthosis nigrans, angioedema, anaphylaxis, 2ry bacterial infection.   ED Management:  Given distribution of rash, given low-moderate strength topical steroid Rx. F/u to PCP. Advised suncreen use, continued vaseline use and limiting sun exposure. Patient agreed to plan of care and voiced understanding. Discharged in stable condition with strict ED return precautions.    Coleen Longoria PA-C  03/09/2020                                   Clinical Impression:       ICD-10-CM ICD-9-CM   1. Irritant contact dermatitis due to cosmetics L24.3 692.81         Disposition:   Disposition: Discharged  Condition: Stable     ED Disposition Condition    Discharge Stable        ED Prescriptions     Medication Sig Dispense Start Date End Date Auth. Provider    triamcinolone acetonide 0.025% (KENALOG) 0.025 % Oint Apply topically 2 (two) times daily. for 10 days 80 g 3/8/2020 3/18/2020 Coleen Longoria PA-C        Follow-up Information     Follow up With Specialties Details Why Contact Info    [facility] Emergency Medicine Go to  Return to the ER immediately, If symptoms  worsen or new symptoms occur [address]  [facility] [id number] [phone]    Hussain Swift MD Family Medicine Go in 1 week  [address] [place] [address]  [phone]                                       Coleen Longoria PA-C  03/09/20 [id number]

## 2020-03-08 NOTE — DISCHARGE INSTRUCTIONS
Use ointment as prescribed.  Follow up next week with primary provider.  Return to emergency department if you develop shortness of breath, uncontrollable vomiting or rapidly spreading rash.    Our goal in the emergency department is to always give you outstanding care and exceptional service. You may receive a survey by mail or e-mail in the next week regarding your experience in our ED. We would greatly appreciate your completing and returning the survey. Your feedback provides us with a way to recognize our staff who give very good care and it helps us learn how to improve when your experience was below our aspiration of excellence.

## 2020-03-28 NOTE — PATIENT INSTRUCTIONS
Understanding Fertility Problems: Improving Ovulation with Medicine  A womans fertility hinges on her ability to ovulate. This is when an egg is released from an ovary. If you are not ovulating, you may be given hormone medicine to help. Read below to find out how these work.  How hormone medicine can help  Hormones are chemicals that your body makes naturally. Each type of hormone has its own function. In some cases, hormones are used for assisted reproductive treatment (ART). Hormone medicine can help:  · Make more eggs. Follicle-stimulating hormone (FSH) causes the ovaries to make more mature eggs each month. Normally one egg matures each month. Note that this does not make a woman run out of eggs faster.  · Trigger ovulation. Human chorionic gonadotropin (hCG) is used to cause ovulation. This can help by controlling the timing of when an egg can be fertilized after sex. It may also be used for ART.  · Adjust levels of other hormones. To help you ovulate, medicine may be prescribed to treat:  ¨ Anovulation or oligovulation. This is when you don't ovulate or don't ovulate regularly.  ¨ High levels of the hormone prolactin. This can stop ovulation from happening.  ¨ An imbalance of thyroid hormone levels. This can cause problems with fertility. It can also cause miscarriage.  Types of hormone medicine  This chart shows some hormone medicines that can help with fertility. Talk with your doctor about how they work. Be sure you know how and when to use them. You should be aware of the possible side effects. Some medicines carry a small but serious risk of ovarian hyperstimulation syndrome (OHSS). This needs prompt treatment.  Medicine How it works How it is taken Possible side effects   Clomiphene citrate Stimulates egg development Pills Hot flashes, blurred vision, breast tenderness, nausea, mood swings, ovarian cysts, and increased chances of having twins   Follicle stimulating hormone or FSH Stimulates the  No apnea or bradycardia. Po feeding well taking 60-75mls. Mother was called and neb Rx for budesonide called in by  to the [facility] located in [facility]  ([address] [place] [address] location) location picked by mother.    Also mother made aware that she w ovaries to produce more mature eggs Injections Increased chance of multiple births. Small but serious risk of ovarian hyperstimulation syndrome   Human menopausal gonadotropin or hMG Stimulates egg development Injections Increased chance of multiple births. Small but serious risk of ovarian hyperstimulation syndrome   Human chorionic gonadotropin or hCG Triggers ovulation Injections May aggravate enlarged ovaries when combined with hMG or FSH medications   Dopamine agonists Decreases prolactin, a hormone that can prevent ovulation Pills Nausea, nasal stuffiness, dizziness, and headache   Levothyroxine Restores thyroid hormones to a normal level Pills Nervousness, irritability, headache, insomnia, diarrhea, weight loss, and changes in menstruation   Pogesersone Helps the endometrium become ready and maintain a pregnancy Pills, vaginal cream Breast tenderness, mood swings   Note: This chart is not a complete list. It does not imply endorsement of any type or brand. It does not show every side effect or reaction. It does not show every precaution or interaction. Only your doctor can recommend or prescribe these types of medicine.  Date Last Reviewed: 5/21/2015  © 9731-6415 [employer]. [address] [place] [address], [place] [address]. All rights reserved. This information is not intended as a substitute for professional medical care. Always follow your healthcare professional's instructions.

## 2021-07-25 ENCOUNTER — HOSPITAL ENCOUNTER (EMERGENCY)
Facility: HOSPITAL | Age: 26
Discharge: HOME | End: 2021-07-25
Attending: EMERGENCY MEDICINE | Admitting: EMERGENCY MEDICINE
Payer: MEDICAID

## 2021-07-25 VITALS
HEART RATE: 95 BPM | RESPIRATION RATE: 18 BRPM | OXYGEN SATURATION: 95 % | BODY MASS INDEX: 25.89 KG/M2 | SYSTOLIC BLOOD PRESSURE: 103 MMHG | DIASTOLIC BLOOD PRESSURE: 59 MMHG | TEMPERATURE: 100 F | WEIGHT: 120 LBS | HEIGHT: 57 IN

## 2021-07-25 DIAGNOSIS — U07.1 COVID-19 VIRUS INFECTION: Primary | ICD-10-CM

## 2021-07-25 LAB
CTP QC/QA: YES
SARS-COV-2 RDRP RESP QL NAA+PROBE: POSITIVE

## 2021-07-25 PROCEDURE — 99284 PR EMERGENCY DEPT VISIT,LEVEL IV: ICD-10-PCS | Mod: ,,, | Performed by: EMERGENCY MEDICINE

## 2021-07-25 PROCEDURE — 99282 EMERGENCY DEPT VISIT SF MDM: CPT | Mod: 25

## 2021-07-25 PROCEDURE — U0002 COVID-19 LAB TEST NON-CDC: HCPCS | Performed by: EMERGENCY MEDICINE

## 2021-07-25 PROCEDURE — 99284 EMERGENCY DEPT VISIT MOD MDM: CPT | Mod: ,,, | Performed by: EMERGENCY MEDICINE

## 2023-06-04 ENCOUNTER — HOSPITAL ENCOUNTER (EMERGENCY)
Facility: HOSPITAL | Age: 28
Discharge: HOME | End: 2023-06-05
Attending: INTERNAL MEDICINE
Payer: MEDICAID

## 2023-06-04 DIAGNOSIS — R11.2 NAUSEA AND VOMITING, UNSPECIFIED VOMITING TYPE: Primary | ICD-10-CM

## 2023-06-04 LAB
B-HCG UR QL: NEGATIVE
CTP QC/QA: YES
POC MOLECULAR INFLUENZA A AGN: NEGATIVE
POC MOLECULAR INFLUENZA B AGN: NEGATIVE
SARS-COV-2 RDRP RESP QL NAA+PROBE: NEGATIVE

## 2023-06-04 PROCEDURE — 81025 URINE PREGNANCY TEST: CPT | Performed by: INTERNAL MEDICINE

## 2023-06-04 PROCEDURE — 99283 EMERGENCY DEPT VISIT LOW MDM: CPT

## 2023-06-04 PROCEDURE — 87502 INFLUENZA DNA AMP PROBE: CPT

## 2023-06-04 RX ORDER — KETOROLAC TROMETHAMINE 30 MG/ML
10 INJECTION, SOLUTION INTRAMUSCULAR; INTRAVENOUS
Status: DISCONTINUED | OUTPATIENT
Start: 2023-06-04 | End: 2023-06-04

## 2023-06-04 RX ORDER — ONDANSETRON 4 MG/1
4 TABLET, FILM COATED ORAL 3 TIMES DAILY PRN
Qty: 15 TABLET | Refills: 0 | Status: SHIPPED | OUTPATIENT
Start: 2023-06-04

## 2023-06-04 RX ORDER — ONDANSETRON 2 MG/ML
4 INJECTION INTRAMUSCULAR; INTRAVENOUS
Status: DISCONTINUED | OUTPATIENT
Start: 2023-06-04 | End: 2023-06-04

## 2023-06-04 RX ORDER — ONDANSETRON 4 MG/1
4 TABLET, ORALLY DISINTEGRATING ORAL
Status: COMPLETED | OUTPATIENT
Start: 2023-06-05 | End: 2023-06-05

## 2023-06-05 ENCOUNTER — HOSPITAL ENCOUNTER (EMERGENCY)
Facility: HOSPITAL | Age: 28
Discharge: HOME | End: 2023-06-05
Attending: EMERGENCY MEDICINE
Payer: MEDICAID

## 2023-06-05 VITALS
RESPIRATION RATE: 20 BRPM | SYSTOLIC BLOOD PRESSURE: 142 MMHG | BODY MASS INDEX: 21.4 KG/M2 | TEMPERATURE: 98 F | HEIGHT: 60 IN | HEART RATE: 74 BPM | DIASTOLIC BLOOD PRESSURE: 68 MMHG | WEIGHT: 109 LBS | OXYGEN SATURATION: 98 %

## 2023-06-05 VITALS
RESPIRATION RATE: 17 BRPM | BODY MASS INDEX: 21.4 KG/M2 | OXYGEN SATURATION: 99 % | HEIGHT: 60 IN | SYSTOLIC BLOOD PRESSURE: 117 MMHG | HEART RATE: 69 BPM | DIASTOLIC BLOOD PRESSURE: 67 MMHG | WEIGHT: 109 LBS | TEMPERATURE: 98 F

## 2023-06-05 DIAGNOSIS — R10.13 EPIGASTRIC PAIN: Primary | ICD-10-CM

## 2023-06-05 DIAGNOSIS — R11.10 VOMITING: ICD-10-CM

## 2023-06-05 LAB
ALBUMIN SERPL BCP-MCNC: 4.8 G/DL (ref 3.5–5.2)
ALP SERPL-CCNC: 78 U/L (ref 55–135)
ALT SERPL W/O P-5'-P-CCNC: 35 U/L (ref 10–44)
ANION GAP SERPL CALC-SCNC: 16 MMOL/L (ref 8–16)
ANION GAP SERPL CALC-SCNC: 19 MMOL/L (ref 8–16)
AST SERPL-CCNC: 24 U/L (ref 10–40)
B-HCG UR QL: NEGATIVE
BACTERIA #/AREA URNS HPF: NORMAL /HPF
BASOPHILS # BLD AUTO: 0.01 K/UL (ref 0–0.2)
BASOPHILS NFR BLD: 0.1 % (ref 0–1.9)
BILIRUB SERPL-MCNC: 1.4 MG/DL (ref 0.1–1)
BILIRUB UR QL STRIP: NEGATIVE
BUN SERPL-MCNC: 13 MG/DL (ref 6–30)
BUN SERPL-MCNC: 14 MG/DL (ref 6–20)
CALCIUM SERPL-MCNC: 10.7 MG/DL (ref 8.7–10.5)
CHLORIDE SERPL-SCNC: 103 MMOL/L (ref 95–110)
CHLORIDE SERPL-SCNC: 104 MMOL/L (ref 95–110)
CLARITY UR: CLEAR
CO2 SERPL-SCNC: 21 MMOL/L (ref 23–29)
COLOR UR: YELLOW
CREAT SERPL-MCNC: 0.7 MG/DL (ref 0.5–1.4)
CREAT SERPL-MCNC: 0.9 MG/DL (ref 0.5–1.4)
CTP QC/QA: YES
DIFFERENTIAL METHOD: ABNORMAL
EOSINOPHIL # BLD AUTO: 0 K/UL (ref 0–0.5)
EOSINOPHIL NFR BLD: 0.2 % (ref 0–8)
ERYTHROCYTE [DISTWIDTH] IN BLOOD BY AUTOMATED COUNT: 12.3 % (ref 11.5–14.5)
EST. GFR  (NO RACE VARIABLE): >60 ML/MIN/1.73 M^2
GLUCOSE SERPL-MCNC: 134 MG/DL (ref 70–110)
GLUCOSE SERPL-MCNC: 144 MG/DL (ref 70–110)
GLUCOSE UR QL STRIP: NEGATIVE
HCT VFR BLD AUTO: 43.6 % (ref 37–48.5)
HCT VFR BLD CALC: 43 %PCV (ref 36–54)
HGB BLD-MCNC: 14.9 G/DL (ref 12–16)
HGB UR QL STRIP: NEGATIVE
HYALINE CASTS #/AREA URNS LPF: 0 /LPF
IMM GRANULOCYTES # BLD AUTO: 0.05 K/UL (ref 0–0.04)
IMM GRANULOCYTES NFR BLD AUTO: 0.4 % (ref 0–0.5)
KETONES UR QL STRIP: ABNORMAL
LEUKOCYTE ESTERASE UR QL STRIP: NEGATIVE
LIPASE SERPL-CCNC: 92 U/L (ref 4–60)
LYMPHOCYTES # BLD AUTO: 1.5 K/UL (ref 1–4.8)
LYMPHOCYTES NFR BLD: 11.1 % (ref 18–48)
MCH RBC QN AUTO: 31 PG (ref 27–31)
MCHC RBC AUTO-ENTMCNC: 34.2 G/DL (ref 32–36)
MCV RBC AUTO: 91 FL (ref 82–98)
MICROSCOPIC COMMENT: NORMAL
MONOCYTES # BLD AUTO: 0.8 K/UL (ref 0.3–1)
MONOCYTES NFR BLD: 6.2 % (ref 4–15)
NEUTROPHILS # BLD AUTO: 10.8 K/UL (ref 1.8–7.7)
NEUTROPHILS NFR BLD: 82 % (ref 38–73)
NITRITE UR QL STRIP: NEGATIVE
NRBC BLD-RTO: 0 /100 WBC
PH UR STRIP: 6 [PH] (ref 5–8)
PLATELET # BLD AUTO: 340 K/UL (ref 150–450)
PMV BLD AUTO: 10.2 FL (ref 9.2–12.9)
POC IONIZED CALCIUM: 1.18 MMOL/L (ref 1.06–1.42)
POC TCO2 (MEASURED): 20 MMOL/L (ref 23–29)
POTASSIUM BLD-SCNC: 3.5 MMOL/L (ref 3.5–5.1)
POTASSIUM SERPL-SCNC: 3.4 MMOL/L (ref 3.5–5.1)
PROT SERPL-MCNC: 8.4 G/DL (ref 6–8.4)
PROT UR QL STRIP: ABNORMAL
RBC # BLD AUTO: 4.81 M/UL (ref 4–5.4)
RBC #/AREA URNS HPF: 2 /HPF (ref 0–4)
SAMPLE: ABNORMAL
SODIUM BLD-SCNC: 139 MMOL/L (ref 136–145)
SODIUM SERPL-SCNC: 140 MMOL/L (ref 136–145)
SP GR UR STRIP: >1.03 (ref 1–1.03)
SQUAMOUS #/AREA URNS HPF: 5 /HPF
TROPONIN I SERPL DL<=0.01 NG/ML-MCNC: <0.006 NG/ML (ref 0–0.03)
URN SPEC COLLECT METH UR: ABNORMAL
UROBILINOGEN UR STRIP-ACNC: NEGATIVE EU/DL
WBC # BLD AUTO: 13.14 K/UL (ref 3.9–12.7)
WBC #/AREA URNS HPF: 4 /HPF (ref 0–5)

## 2023-06-05 PROCEDURE — 82308 ASSAY OF CALCITONIN: CPT

## 2023-06-05 PROCEDURE — 82565 ASSAY OF CREATININE: CPT | Mod: 91

## 2023-06-05 PROCEDURE — 83690 ASSAY OF LIPASE: CPT | Performed by: EMERGENCY MEDICINE

## 2023-06-05 PROCEDURE — 80053 COMPREHEN METABOLIC PANEL: CPT | Performed by: EMERGENCY MEDICINE

## 2023-06-05 PROCEDURE — 93005 ELECTROCARDIOGRAM TRACING: CPT

## 2023-06-05 PROCEDURE — 93010 ELECTROCARDIOGRAM REPORT: CPT | Mod: ,,, | Performed by: INTERNAL MEDICINE

## 2023-06-05 PROCEDURE — 99285 EMERGENCY DEPT VISIT HI MDM: CPT | Mod: 25

## 2023-06-05 PROCEDURE — 84295 ASSAY OF SERUM SODIUM: CPT | Mod: 91

## 2023-06-05 PROCEDURE — 25500020 PHARM REV CODE 255: Performed by: EMERGENCY MEDICINE

## 2023-06-05 PROCEDURE — 96375 TX/PRO/DX INJ NEW DRUG ADDON: CPT

## 2023-06-05 PROCEDURE — 81025 URINE PREGNANCY TEST: CPT | Performed by: EMERGENCY MEDICINE

## 2023-06-05 PROCEDURE — 85014 HEMATOCRIT: CPT | Mod: 91

## 2023-06-05 PROCEDURE — 99900035 HC TECH TIME PER 15 MIN (STAT)

## 2023-06-05 PROCEDURE — 85025 COMPLETE CBC W/AUTO DIFF WBC: CPT | Performed by: EMERGENCY MEDICINE

## 2023-06-05 PROCEDURE — 96372 THER/PROPH/DIAG INJ SC/IM: CPT | Mod: 59 | Performed by: EMERGENCY MEDICINE

## 2023-06-05 PROCEDURE — 93010 EKG 12-LEAD: ICD-10-PCS | Mod: ,,, | Performed by: INTERNAL MEDICINE

## 2023-06-05 PROCEDURE — 25000003 PHARM REV CODE 250: Performed by: PHYSICIAN ASSISTANT

## 2023-06-05 PROCEDURE — 84132 ASSAY OF SERUM POTASSIUM: CPT | Mod: 91

## 2023-06-05 PROCEDURE — 81000 URINALYSIS NONAUTO W/SCOPE: CPT | Performed by: EMERGENCY MEDICINE

## 2023-06-05 PROCEDURE — 63600175 PHARM REV CODE 636 W HCPCS: Performed by: EMERGENCY MEDICINE

## 2023-06-05 PROCEDURE — 25000003 PHARM REV CODE 250: Performed by: EMERGENCY MEDICINE

## 2023-06-05 PROCEDURE — 96374 THER/PROPH/DIAG INJ IV PUSH: CPT | Mod: 59

## 2023-06-05 PROCEDURE — 80047 BASIC METABLC PNL IONIZED CA: CPT

## 2023-06-05 PROCEDURE — 82330 ASSAY OF CALCIUM: CPT

## 2023-06-05 PROCEDURE — 84484 ASSAY OF TROPONIN QUANT: CPT | Performed by: EMERGENCY MEDICINE

## 2023-06-05 RX ORDER — PROMETHAZINE HYDROCHLORIDE 25 MG/1
25 SUPPOSITORY RECTAL EVERY 6 HOURS PRN
Qty: 10 SUPPOSITORY | Refills: 0 | OUTPATIENT
Start: 2023-06-05 | End: 2024-03-31

## 2023-06-05 RX ORDER — FAMOTIDINE 10 MG/ML
20 INJECTION INTRAVENOUS
Status: COMPLETED | OUTPATIENT
Start: 2023-06-05 | End: 2023-06-05

## 2023-06-05 RX ORDER — DICYCLOMINE HYDROCHLORIDE 20 MG/1
20 TABLET ORAL 2 TIMES DAILY
Qty: 20 TABLET | Refills: 0 | Status: SHIPPED | OUTPATIENT
Start: 2023-06-05 | End: 2023-07-05

## 2023-06-05 RX ORDER — ONDANSETRON 2 MG/ML
4 INJECTION INTRAMUSCULAR; INTRAVENOUS
Status: COMPLETED | OUTPATIENT
Start: 2023-06-05 | End: 2023-06-05

## 2023-06-05 RX ORDER — FAMOTIDINE 20 MG/1
20 TABLET, FILM COATED ORAL 2 TIMES DAILY
Qty: 20 TABLET | Refills: 0 | Status: SHIPPED | OUTPATIENT
Start: 2023-06-05 | End: 2024-06-04

## 2023-06-05 RX ORDER — DICYCLOMINE HYDROCHLORIDE 10 MG/ML
20 INJECTION INTRAMUSCULAR
Status: COMPLETED | OUTPATIENT
Start: 2023-06-05 | End: 2023-06-05

## 2023-06-05 RX ADMIN — ONDANSETRON 4 MG: 4 TABLET, ORALLY DISINTEGRATING ORAL at 12:06

## 2023-06-05 RX ADMIN — FAMOTIDINE 20 MG: 10 INJECTION INTRAVENOUS at 09:06

## 2023-06-05 RX ADMIN — SODIUM CHLORIDE 1000 ML: 9 INJECTION, SOLUTION INTRAVENOUS at 09:06

## 2023-06-05 RX ADMIN — IOHEXOL 75 ML: 350 INJECTION, SOLUTION INTRAVENOUS at 10:06

## 2023-06-05 RX ADMIN — ONDANSETRON 4 MG: 2 INJECTION INTRAMUSCULAR; INTRAVENOUS at 09:06

## 2023-06-05 RX ADMIN — DICYCLOMINE HYDROCHLORIDE 20 MG: 20 INJECTION, SOLUTION INTRAMUSCULAR at 09:06

## 2023-06-05 NOTE — ED TRIAGE NOTES
29 yo female presents to the ED with c/o N/V with body aches. Pt reports that she began experiencing symptoms around 0200 yesterday. Pt denies any other symptom and/or any PMH. Pt endorses that she receives the depo provera injection. Pt rates pain at 6 on a PRS of 0-10.

## 2023-06-05 NOTE — DISCHARGE INSTRUCTIONS

## 2023-06-05 NOTE — Clinical Note
"Kate"Jocelyn Bailey was seen and treated in our emergency department on 6/5/2023.  She may return to work on 06/07/2023.       If you have any questions or concerns, please don't hesitate to call.      Jono Thomson MD"

## 2023-06-05 NOTE — ED PROVIDER NOTES
Encounter Date: 6/5/2023       History     Chief Complaint   Patient presents with    Emesis     Pt to ER with reports of N/V and epigastric pain x few days. Pt seen in ER last night for same.      28-year-old female no past medical history other than PCOS, migraines presenting secondary to epigastric abdominal pain with nausea and vomiting.  No fevers or chills.  Vomiting numerous times.  No change in bowel movements.  No vaginal complaints.  No burning on urination or increased frequency of urination.  States that she is on her menstrual cycle.  Does not know of any new food exposures.  No sick contacts.  No trauma to her abdomen.  Pain is in the epigastrium.  No fevers.      Review of patient's allergies indicates:   Allergen Reactions    Diflucan [fluconazole]      Facial Swelling     Past Medical History:   Diagnosis Date    Migraine     PCOS (polycystic ovarian syndrome)     Recurrent epistaxis      Past Surgical History:   Procedure Laterality Date    ADENOIDECTOMY      got eggs frozen Bilateral 07/2018    TONSILLECTOMY       Family History   Problem Relation Age of Onset    Cancer Paternal Grandfather         breast     Hypertension Maternal Grandmother      Social History     Tobacco Use    Smoking status: Never    Smokeless tobacco: Never   Substance Use Topics    Alcohol use: No    Drug use: No     Review of Systems   Constitutional:  Negative for fever.   HENT:  Negative for sore throat.    Respiratory:  Negative for shortness of breath.    Cardiovascular:  Negative for chest pain.   Gastrointestinal:  Positive for abdominal pain, nausea and vomiting.   Genitourinary:  Negative for dysuria.   Musculoskeletal:  Negative for back pain.   Skin:  Negative for rash.   Neurological:  Negative for weakness.   Hematological:  Does not bruise/bleed easily.   Psychiatric/Behavioral:  Negative for agitation.      Physical Exam     Initial Vitals [06/05/23 0911]   BP Pulse Resp Temp SpO2   132/71 67 18 98.2 °F (36.8  °C) 97 %      MAP       --         Physical Exam    Nursing note and vitals reviewed.  Constitutional: She appears well-developed and well-nourished.   HENT:   Head: Normocephalic and atraumatic.   Mouth/Throat: Oropharynx is clear and moist and mucous membranes are normal.   Eyes: Conjunctivae and EOM are normal. Pupils are equal, round, and reactive to light. Right conjunctiva is not injected. Left conjunctiva is not injected. No scleral icterus.   Neck: Neck supple.   Normal range of motion.   Full passive range of motion without pain.     Cardiovascular:  Normal rate, regular rhythm, S1 normal, S2 normal, normal heart sounds and normal pulses.     Exam reveals no gallop and no friction rub.       No murmur heard.  Pulses:       Radial pulses are 2+ on the right side and 2+ on the left side.   Pulmonary/Chest: Effort normal and breath sounds normal. No respiratory distress.   Abdominal: Abdomen is soft.   Epigastric tenderness with no rebound or guarding.  No CVA tenderness.   Musculoskeletal:         General: No edema. Normal range of motion.      Cervical back: Full passive range of motion without pain, normal range of motion and neck supple.      Comments: Good active ROM of all extremities. No lower extremity edema or cyanosis.      Neurological: No cranial nerve deficit. Gait normal.   A&Ox4, normal speech.   Skin: Skin is warm. No ecchymosis and no rash noted.   Psychiatric: She has a normal mood and affect. Thought content normal.       ED Course   Procedures  Labs Reviewed   CBC W/ AUTO DIFFERENTIAL - Abnormal; Notable for the following components:       Result Value    WBC 13.14 (*)     Gran # (ANC) 10.8 (*)     Immature Grans (Abs) 0.05 (*)     Gran % 82.0 (*)     Lymph % 11.1 (*)     All other components within normal limits   COMPREHENSIVE METABOLIC PANEL - Abnormal; Notable for the following components:    Potassium 3.4 (*)     CO2 21 (*)     Glucose 134 (*)     Calcium 10.7 (*)     Total Bilirubin  1.4 (*)     All other components within normal limits   LIPASE - Abnormal; Notable for the following components:    Lipase 92 (*)     All other components within normal limits   URINALYSIS, REFLEX TO URINE CULTURE - Abnormal; Notable for the following components:    Specific Gravity, UA >1.030 (*)     Protein, UA 2+ (*)     Ketones, UA 1+ (*)     All other components within normal limits    Narrative:     Specimen Source->Urine   ISTAT PROCEDURE - Abnormal; Notable for the following components:    POC Glucose 144 (*)     POC TCO2 (MEASURED) 20 (*)     POC Anion Gap 19 (*)     All other components within normal limits   TROPONIN I   URINALYSIS MICROSCOPIC    Narrative:     Specimen Source->Urine   POCT URINE PREGNANCY   ISTAT CHEM8     EKG Readings: (Independently Interpreted)   Rhythm: Normal Sinus Rhythm. Heart Rate: 62. Ectopy: No Ectopy. Conduction: Normal. ST Segments: Normal ST Segments. T Waves: Normal. Clinical Impression: Normal Sinus Rhythm     Imaging Results              CT Abdomen Pelvis With Contrast (Final result)  Result time 06/05/23 12:32:25      Final result by Wes Constantino MD (06/05/23 12:32:25)                   Impression:      No acute process or CT findings identified to explain patient's symptoms of nausea/vomiting.  Specifically, no evidence of bowel obstruction or acute inflammation.    Colonic diverticulosis without diverticulitis.      Electronically signed by: Wes Constantino MD  Date:    06/05/2023  Time:    12:32               Narrative:    EXAMINATION:  CT ABDOMEN PELVIS WITH CONTRAST    CLINICAL HISTORY:  Nausea/vomiting;    TECHNIQUE:  Low dose axial images, sagittal and coronal reformations were obtained from the lung bases to the pubic symphysis following the IV administration of 75 mL of Omnipaque 350 .  Oral contrast was not given.    COMPARISON:  Pelvic ultrasound 01/08/2016, chest radiograph 06/05/2023    FINDINGS:  Imaged lung bases are clear.  Base of the heart is within  normal limits.    Main portal vein appears patent.  Liver, gallbladder, pancreas, spleen, stomach, duodenum and bilateral adrenal glands are within normal limits.  No biliary ductal dilatation.  Small accessory splenule anterior to the spleen.    Bilateral kidneys are normal in size, shape and location with symmetric normal enhancement.  No hydronephrosis or significant perinephric stranding.  Ureters are nondilated.  Urinary bladder is suboptimally distended.  Uterus is anteflexed and slightly tilted towards the right.  No adnexal mass seen.  No significant volume free fluid in the pelvis.  Suspected intra vaginal tampon in place.    Appendix and terminal ileum are within normal limits.  Several scattered colonic diverticula without acute diverticulitis.  Majority of the small and large bowel loops are relatively decompressed.  No evidence of bowel obstruction or acute inflammation.  No pneumatosis or portal venous gas.    No ascites, free air or lymphadenopathy.  No significant atherosclerosis.  No aortic aneurysm or dissection.    No significant abnormality of the extraperitoneal soft tissues.    Chronic appearing minimal anterior wedge deformity of T11 and T12 with minimal degenerative change.  No acute or destructive osseous process seen.                                       X-Ray Chest PA And Lateral (Final result)  Result time 06/05/23 10:45:51      Final result by Ronen Mantilla MD (06/05/23 10:45:51)                   Impression:      No convincing evidence of acute cardiopulmonary disease.      Electronically signed by: Ronen Mantilla  Date:    06/05/2023  Time:    10:45               Narrative:    EXAMINATION:  XR CHEST PA AND LATERAL    CLINICAL HISTORY:  Vomiting, unspecified    TECHNIQUE:  PA and lateral views of the chest were performed.    COMPARISON:  Chest radiograph performed 11/05/2019.    FINDINGS:  Cardiomediastinal contours appear to be within normal limits.  Lungs appear essentially  clear.  No definite pneumothorax or large volume pleural effusion.    No acute findings identified in the visualized abdomen.  Osseous and soft tissue structures appear without definite acute change.                                       Medications   famotidine (PF) injection 20 mg (20 mg Intravenous Given 6/5/23 0908)   ondansetron injection 4 mg (4 mg Intravenous Given 6/5/23 0947)   dicyclomine injection 20 mg (20 mg Intramuscular Given 6/5/23 0963)   sodium chloride 0.9% bolus 1,000 mL 1,000 mL (1,000 mLs Intravenous New Bag 6/5/23 0949)   iohexoL (OMNIPAQUE 350) injection 75 mL (75 mLs Intravenous Given 6/5/23 6318)     Medical Decision Making:   Initial Assessment:   28-year-old female presenting today secondary to epigastric abdominal pain with nausea vomiting.  Nonbilious nonbloody vomiting.  Afebrile.  Epigastric tenderness.  Patient given medications above.  Marked improvement is symptoms.  Due to bounce back and continue abdominal tenderness and vomiting get CT which does not show the acute.  Labs notable for low dehydration mildly elevated lipase.  Repeat exam reassuring.  No signs of obstruction.  Do not think gallbladder disease or UTI.  Do not think kidney stone.  Troponin reassuring.  No signs of pulmonary edema normal thorax or referred pain from pneumonia.  Low heart score.  I discussed with the patient/family the diagnosis, treatment plan, indications for return to the emergency department, and for expected follow-up. The patient/family verbalized an understanding. The patient/family is asked if there are any questions or concerns. We discuss the case, until all issues are addressed to the patient/family's satisfaction. Patient/family understands and is agreeable to the plan.   Jono Thomson    DISCLAIMER: This note was prepared with [employer] voice recognition transcription software. Garbled syntax, mangled pronouns, and other bizarre constructions may be attributed to that software  system.    Clinical Tests:   Lab Tests: Ordered and Reviewed  Radiological Study: Reviewed and Ordered  Medical Tests: Ordered and Reviewed                        Clinical Impression:   Final diagnoses:  [R11.10] Vomiting  [R10.13] Epigastric pain (Primary)        ED Disposition Condition    Discharge Stable          ED Prescriptions       Medication Sig Dispense Start Date End Date Auth. Provider    famotidine (PEPCID) 20 MG tablet Take 1 tablet (20 mg total) by mouth 2 (two) times daily. 20 tablet 6/5/2023 6/4/2024 Jono Thomson MD    dicyclomine (BENTYL) 20 mg tablet Take 1 tablet (20 mg total) by mouth 2 (two) times daily. 20 tablet 6/5/2023 7/5/2023 Jono Thomson MD    promethazine (PHENERGAN) 25 MG suppository Place 1 suppository (25 mg total) rectally every 6 (six) hours as needed for Nausea. 10 suppository 6/5/2023 -- Jono Thomson MD          Follow-up Information       Follow up With Specialties Details Why Contact Info    Hussain Swift MD Family Medicine Schedule an appointment as soon as possible for a visit in 2 days  [address] [place] [address]  [phone]               Jono Thomson MD  06/05/23 8549

## 2023-06-05 NOTE — DISCHARGE INSTRUCTIONS
Drink plenty of fluids and rest, use Zofran as needed for nausea and return to the ED for any new or worsening symptoms    Thank you for coming to our Emergency Department today. It is important to remember that some problems are difficult to diagnose and may not be found during your Emergency Department visit. Be sure to follow up with your primary care doctor and review all labs/imaging/tests that were performed during this visit with them. Some labs/tests may be outside of the normal range and require non-emergent follow-up and further investigation to help diagnose/exclude/prevent complications or other medical conditions.    If you do not have a primary care doctor, you may contact the one listed on your discharge paperwork or you may also call the [facility] Appointment Desk at [phone] to schedule an appointment and establish care with one. It is important to your health that you have a primary care doctor.    Please take all medications as directed. All medications may potentially have side-effects and it is impossible to predict which medications may give you side-effects or what side-effects (if any) they will give you.. If you feel that you are having a negative effect or side-effect of any medication you should immediately stop taking them and seek medical attention. If you feel that you are having a life-threatening reaction call 911.    Return to the ER with any questions/concerns, new/concerning symptoms, worsening or failure to improve.     Do not drive, swim, climb to height, take a bath or make any important decisions for 24 hours if you have received any pain medications, sedatives or mood altering drugs during your ER visit.

## 2023-06-05 NOTE — ED TRIAGE NOTES
"Pt arrived to ED for c/o of continuous n/v  and body aches, states she has " not eaten anything since Saturday." Pt also reports RUQ and epigastric pain has gallbladder and appendix still in place . Was seen in ER last night and DX with food poison    "

## 2023-06-05 NOTE — ED PROVIDER NOTES
Encounter Date: 6/4/2023       History     Chief Complaint   Patient presents with    Vomiting     Since 2 am and now has blood in it, also having body aches     28-year-old presenting with nausea and vomiting.  Symptoms began this morning.  States that she was vomiting so hard that she had some blood in her vomit.  Vomiting has stopped for couple of hours.  Denies any fevers or chills.  Does endorse mild body aches.  No urinary complaints.  No abdominal pain.  No chest pain.  Vital signs are normal.  Patient appears well and nontoxic and nondistressed.    Review of patient's allergies indicates:   Allergen Reactions    Diflucan [fluconazole]      Facial Swelling     Past Medical History:   Diagnosis Date    Migraine     PCOS (polycystic ovarian syndrome)     Recurrent epistaxis      Past Surgical History:   Procedure Laterality Date    ADENOIDECTOMY      got eggs frozen Bilateral 07/2018    TONSILLECTOMY       Family History   Problem Relation Age of Onset    Cancer Paternal Grandfather         breast     Hypertension Maternal Grandmother      Social History     Tobacco Use    Smoking status: Never    Smokeless tobacco: Never   Substance Use Topics    Alcohol use: No    Drug use: No     Review of Systems   Constitutional:  Negative for fever.   HENT:  Negative for sore throat.    Respiratory:  Negative for shortness of breath.    Cardiovascular:  Negative for chest pain.   Gastrointestinal:  Positive for vomiting. Negative for nausea.   Genitourinary:  Negative for dysuria.   Musculoskeletal:  Negative for back pain.   Skin:  Negative for rash.   Neurological:  Negative for weakness.   Hematological:  Does not bruise/bleed easily.     Physical Exam     Initial Vitals [06/04/23 2143]   BP Pulse Resp Temp SpO2   114/75 79 16 98.4 °F (36.9 °C) 98 %      MAP       --         Physical Exam    Constitutional: Vital signs are normal. She appears well-developed and well-nourished.   HENT:   Head: Normocephalic and  atraumatic.   Right Ear: Hearing normal.   Left Ear: Hearing normal.   No acute findings   Eyes: Conjunctivae are normal.   Cardiovascular:  Normal rate and regular rhythm.           Pulmonary/Chest:   Clear and benign   Abdominal: Abdomen is soft. Bowel sounds are normal.   Soft and benign   Musculoskeletal:         General: Normal range of motion.     Neurological: She is alert and oriented to person, place, and time.   Skin: Skin is warm and intact.   Psychiatric: She has a normal mood and affect. Her speech is normal and behavior is normal. Cognition and memory are normal.       ED Course   Procedures  Labs Reviewed   POCT URINE PREGNANCY - Abnormal   POCT INFLUENZA A/B MOLECULAR   SARS-COV-2 RDRP GENE          Imaging Results    None          Medications   ondansetron disintegrating tablet 4 mg (has no administration in time range)     Medical Decision Making:   History:   Old Medical Records: I decided to obtain old medical records.  Initial Assessment:   28-year-old female presenting with nausea and vomiting  Differential Diagnosis:   Gastroenteritis, gastritis, pregnancy, UTI  ED Management:  Plan:   Reassuring physical exam and reassuring vital signs.  Patient appears well and nondistressed.  Her nausea and vomiting has stopped now for a couple of hours.  Not spitting up or coughing up blood.  Not vomiting blood at this time.  On Depo-Provera and denies chance of pregnancy.   Suspect patient likely has a viral GI illness.  Offered placing an IV and getting blood work, treating symptoms with supportive care, patient does not want that.  He would prefer to have something for her nausea and go home.  She was given p.o. Zofran and discharged in stable condition.                        Clinical Impression:   Final diagnoses:  [R11.2] Nausea and vomiting, unspecified vomiting type (Primary)        ED Disposition Condition    Discharge Stable          ED Prescriptions       Medication Sig Dispense Start Date End  Date Auth. Provider    ondansetron (ZOFRAN) 4 MG tablet Take 1 tablet (4 mg total) by mouth 3 (three) times daily as needed for Nausea. 15 tablet 6/4/2023 -- Matthew Alexander PA-C          Follow-up Information       Follow up With Specialties Details Why Contact Info    Hussain Swift MD [facility]   [address] [place] [address]  [phone]               Matthew Alexander PA-C  06/04/23 1830

## 2024-03-31 ENCOUNTER — HOSPITAL ENCOUNTER (EMERGENCY)
Facility: HOSPITAL | Age: 29
Discharge: HOME | End: 2024-03-31
Attending: STUDENT IN AN ORGANIZED HEALTH CARE EDUCATION/TRAINING PROGRAM
Payer: MEDICAID

## 2024-03-31 VITALS
HEIGHT: 60 IN | DIASTOLIC BLOOD PRESSURE: 71 MMHG | BODY MASS INDEX: 23.56 KG/M2 | OXYGEN SATURATION: 99 % | SYSTOLIC BLOOD PRESSURE: 125 MMHG | WEIGHT: 120 LBS | TEMPERATURE: 98 F | HEART RATE: 85 BPM | RESPIRATION RATE: 18 BRPM

## 2024-03-31 DIAGNOSIS — R11.2 NAUSEA AND VOMITING, UNSPECIFIED VOMITING TYPE: ICD-10-CM

## 2024-03-31 DIAGNOSIS — A08.4 VIRAL GASTROENTERITIS: Primary | ICD-10-CM

## 2024-03-31 LAB
ALBUMIN SERPL BCP-MCNC: 4.7 G/DL (ref 3.5–5.2)
ALP SERPL-CCNC: 83 U/L (ref 55–135)
ALT SERPL W/O P-5'-P-CCNC: 28 U/L (ref 10–44)
ANION GAP SERPL CALC-SCNC: 15 MMOL/L (ref 8–16)
AST SERPL-CCNC: 27 U/L (ref 10–40)
B-HCG UR QL: NEGATIVE
BACTERIA #/AREA URNS HPF: ABNORMAL /HPF
BASOPHILS # BLD AUTO: 0.03 K/UL (ref 0–0.2)
BASOPHILS NFR BLD: 0.3 % (ref 0–1.9)
BILIRUB SERPL-MCNC: 1.2 MG/DL (ref 0.1–1)
BILIRUB UR QL STRIP: NEGATIVE
BUN SERPL-MCNC: 11 MG/DL (ref 6–20)
CALCIUM SERPL-MCNC: 10.8 MG/DL (ref 8.7–10.5)
CHLORIDE SERPL-SCNC: 104 MMOL/L (ref 95–110)
CLARITY UR: ABNORMAL
CO2 SERPL-SCNC: 20 MMOL/L (ref 23–29)
COLOR UR: YELLOW
CREAT SERPL-MCNC: 1 MG/DL (ref 0.5–1.4)
CTP QC/QA: YES
DIFFERENTIAL METHOD BLD: ABNORMAL
EOSINOPHIL # BLD AUTO: 0.1 K/UL (ref 0–0.5)
EOSINOPHIL NFR BLD: 0.6 % (ref 0–8)
ERYTHROCYTE [DISTWIDTH] IN BLOOD BY AUTOMATED COUNT: 12.2 % (ref 11.5–14.5)
EST. GFR  (NO RACE VARIABLE): >60 ML/MIN/1.73 M^2
GLUCOSE SERPL-MCNC: 119 MG/DL (ref 70–110)
GLUCOSE UR QL STRIP: NEGATIVE
HCT VFR BLD AUTO: 45.3 % (ref 37–48.5)
HGB BLD-MCNC: 15.4 G/DL (ref 12–16)
HGB UR QL STRIP: NEGATIVE
HYALINE CASTS #/AREA URNS LPF: 0 /LPF
IMM GRANULOCYTES # BLD AUTO: 0.03 K/UL (ref 0–0.04)
IMM GRANULOCYTES NFR BLD AUTO: 0.3 % (ref 0–0.5)
KETONES UR QL STRIP: ABNORMAL
LEUKOCYTE ESTERASE UR QL STRIP: NEGATIVE
LIPASE SERPL-CCNC: 21 U/L (ref 4–60)
LYMPHOCYTES # BLD AUTO: 2 K/UL (ref 1–4.8)
LYMPHOCYTES NFR BLD: 18.2 % (ref 18–48)
MCH RBC QN AUTO: 30.9 PG (ref 27–31)
MCHC RBC AUTO-ENTMCNC: 34 G/DL (ref 32–36)
MCV RBC AUTO: 91 FL (ref 82–98)
MICROSCOPIC COMMENT: ABNORMAL
MONOCYTES # BLD AUTO: 0.6 K/UL (ref 0.3–1)
MONOCYTES NFR BLD: 5.6 % (ref 4–15)
NEUTROPHILS # BLD AUTO: 8.1 K/UL (ref 1.8–7.7)
NEUTROPHILS NFR BLD: 75 % (ref 38–73)
NITRITE UR QL STRIP: NEGATIVE
NRBC BLD-RTO: 0 /100 WBC
PH UR STRIP: 8 [PH] (ref 5–8)
PLATELET # BLD AUTO: 295 K/UL (ref 150–450)
PMV BLD AUTO: 10.2 FL (ref 9.2–12.9)
POTASSIUM SERPL-SCNC: 3.4 MMOL/L (ref 3.5–5.1)
PROT SERPL-MCNC: 8.2 G/DL (ref 6–8.4)
PROT UR QL STRIP: ABNORMAL
RBC # BLD AUTO: 4.99 M/UL (ref 4–5.4)
RBC #/AREA URNS HPF: 5 /HPF (ref 0–4)
SODIUM SERPL-SCNC: 139 MMOL/L (ref 136–145)
SP GR UR STRIP: 1.03 (ref 1–1.03)
SQUAMOUS #/AREA URNS HPF: 9 /HPF
URN SPEC COLLECT METH UR: ABNORMAL
UROBILINOGEN UR STRIP-ACNC: ABNORMAL EU/DL
WBC # BLD AUTO: 10.81 K/UL (ref 3.9–12.7)
WBC #/AREA URNS HPF: 4 /HPF (ref 0–5)

## 2024-03-31 PROCEDURE — 81025 URINE PREGNANCY TEST: CPT

## 2024-03-31 PROCEDURE — 96361 HYDRATE IV INFUSION ADD-ON: CPT

## 2024-03-31 PROCEDURE — 85025 COMPLETE CBC W/AUTO DIFF WBC: CPT

## 2024-03-31 PROCEDURE — 25000003 PHARM REV CODE 250

## 2024-03-31 PROCEDURE — 81000 URINALYSIS NONAUTO W/SCOPE: CPT

## 2024-03-31 PROCEDURE — 99284 EMERGENCY DEPT VISIT MOD MDM: CPT | Mod: 25

## 2024-03-31 PROCEDURE — 96374 THER/PROPH/DIAG INJ IV PUSH: CPT

## 2024-03-31 PROCEDURE — 63600175 PHARM REV CODE 636 W HCPCS

## 2024-03-31 PROCEDURE — 80053 COMPREHEN METABOLIC PANEL: CPT

## 2024-03-31 PROCEDURE — 83690 ASSAY OF LIPASE: CPT

## 2024-03-31 RX ORDER — DICYCLOMINE HYDROCHLORIDE 20 MG/1
20 TABLET ORAL 4 TIMES DAILY PRN
Qty: 28 TABLET | Refills: 0 | Status: SHIPPED | OUTPATIENT
Start: 2024-03-31 | End: 2024-04-07

## 2024-03-31 RX ORDER — PROMETHAZINE HYDROCHLORIDE 25 MG/1
25 TABLET ORAL EVERY 6 HOURS PRN
Qty: 15 TABLET | Refills: 0 | Status: SHIPPED | OUTPATIENT
Start: 2024-03-31

## 2024-03-31 RX ORDER — ONDANSETRON 4 MG/1
4 TABLET, ORALLY DISINTEGRATING ORAL EVERY 6 HOURS PRN
Qty: 15 TABLET | Refills: 0 | Status: SHIPPED | OUTPATIENT
Start: 2024-03-31 | End: 2024-04-05

## 2024-03-31 RX ORDER — ONDANSETRON HYDROCHLORIDE 2 MG/ML
4 INJECTION, SOLUTION INTRAVENOUS
Status: COMPLETED | OUTPATIENT
Start: 2024-03-31 | End: 2024-03-31

## 2024-03-31 RX ORDER — DICYCLOMINE HYDROCHLORIDE 10 MG/1
20 CAPSULE ORAL
Status: COMPLETED | OUTPATIENT
Start: 2024-03-31 | End: 2024-03-31

## 2024-03-31 RX ORDER — ACETAMINOPHEN 500 MG
500 TABLET ORAL EVERY 4 HOURS PRN
Qty: 20 TABLET | Refills: 0 | Status: SHIPPED | OUTPATIENT
Start: 2024-03-31 | End: 2024-04-05

## 2024-03-31 RX ADMIN — SODIUM CHLORIDE 1000 ML: 9 INJECTION, SOLUTION INTRAVENOUS at 06:03

## 2024-03-31 RX ADMIN — ONDANSETRON 4 MG: 2 INJECTION INTRAMUSCULAR; INTRAVENOUS at 06:03

## 2024-03-31 RX ADMIN — DICYCLOMINE HYDROCHLORIDE 20 MG: 10 CAPSULE ORAL at 07:03

## 2024-03-31 NOTE — Clinical Note
"Kate"Jocelyn Bailey was seen and treated in our emergency department on 3/31/2024.  She may return to work on 04/03/2024.       If you have any questions or concerns, please don't hesitate to call.      Radha Jade PA-C"

## 2024-03-31 NOTE — ED PROVIDER NOTES
Encounter Date: 3/31/2024    SCRIBE #1 NOTE: I, Barbara Bay, am scribing for, and in the presence of,  Hernandez Mccartney PA-C. I have scribed the following portions of the note - Other sections scribed: HPI, ROS.       History     Chief Complaint   Patient presents with    Abdominal Pain     Pt reports generalized abd pain accompanied by N/V/D and chills x3 days. Pt denies chest pain or shortness of breath.     Kate Bailey is a 28 y.o. female with no relevant PHMx that presents to the ED for abdominal pain that began about 3 days ago. The patient notes associated symptoms of nausea, vomiting, diarrhea, and chills. She also reports that she cannot keep foods or liquids down without vomiting. The patient denies use of any medications in attempt to alleviate symptoms pta. Denies any known cause or exposure to the illness. The patient denies associated symptoms of chest pain, cough, congestion, rhinorrhea, sore throat, SOB, blood in stool, or other symptoms.    The history is provided by the patient. No  was used.     Review of patient's allergies indicates:   Allergen Reactions    Diflucan [fluconazole]      Facial Swelling     Past Medical History:   Diagnosis Date    Migraine     PCOS (polycystic ovarian syndrome)     Recurrent epistaxis      Past Surgical History:   Procedure Laterality Date    ADENOIDECTOMY      got eggs frozen Bilateral 07/2018    TONSILLECTOMY       Family History   Problem Relation Age of Onset    Cancer Paternal Grandfather         breast     Hypertension Maternal Grandmother      Social History     Tobacco Use    Smoking status: Never    Smokeless tobacco: Never   Substance Use Topics    Alcohol use: No    Drug use: No     Review of Systems   Constitutional:  Positive for chills. Negative for diaphoresis, fatigue and fever.   HENT:  Negative for congestion, rhinorrhea and sore throat.    Eyes:  Negative for redness and visual disturbance.   Respiratory:  Negative  for cough and shortness of breath.    Cardiovascular:  Negative for chest pain, palpitations and leg swelling.   Gastrointestinal:  Positive for abdominal pain, diarrhea, nausea and vomiting. Negative for abdominal distention, anal bleeding, blood in stool, constipation and rectal pain.   Genitourinary:  Negative for difficulty urinating, dysuria, flank pain, frequency, vaginal bleeding and vaginal discharge.   Musculoskeletal:  Negative for arthralgias, back pain, myalgias, neck pain and neck stiffness.   Skin:  Negative for rash.   Neurological:  Negative for dizziness, weakness, light-headedness and headaches.   Hematological:  Does not bruise/bleed easily.       Physical Exam     Initial Vitals [03/31/24 1818]   BP Pulse Resp Temp SpO2   121/80 81 18 98.5 °F (36.9 °C) 99 %      MAP       --         Physical Exam    Nursing note and vitals reviewed.  Constitutional: She appears well-developed and well-nourished. She is not diaphoretic. No distress.   HENT:   Head: Normocephalic and atraumatic.   Right Ear: External ear normal.   Left Ear: External ear normal.   Nose: Nose normal.   Mouth/Throat: Uvula is midline and oropharynx is clear and moist. Mucous membranes are dry.   Eyes: Conjunctivae and EOM are normal. Pupils are equal, round, and reactive to light. Right eye exhibits no discharge. Left eye exhibits no discharge.   Neck: Neck supple. No JVD present.   Normal range of motion.   Full passive range of motion without pain.     Cardiovascular:  Normal rate, regular rhythm, normal heart sounds, intact distal pulses and normal pulses.     Exam reveals no distant heart sounds and no friction rub.       Pulmonary/Chest: Effort normal and breath sounds normal. No respiratory distress.   Abdominal: Abdomen is soft. Bowel sounds are normal. She exhibits no distension, no pulsatile midline mass and no mass. There is no splenomegaly or hepatomegaly. There is no abdominal tenderness.   Abdomen is soft nontender  nonrigid nondistended no rebound or guarding, no masses, hyperactive bowel sounds.   No right CVA tenderness.  No left CVA tenderness. There is no rebound and no guarding.   Musculoskeletal:         General: Normal range of motion.      Cervical back: Normal, full passive range of motion without pain, normal range of motion and neck supple.      Thoracic back: Normal.      Lumbar back: Normal.      Right lower leg: Normal.      Left lower leg: Normal.     Neurological: She is alert and oriented to person, place, and time. She has normal strength. No cranial nerve deficit or sensory deficit. Gait normal.   Skin: Skin is warm and dry. Capillary refill takes less than 2 seconds. No bruising, no ecchymosis and no rash noted. No pallor.   Normal skin turgor   Psychiatric: She has a normal mood and affect. Her speech is normal and behavior is normal. Thought content normal.         ED Course   Procedures  Labs Reviewed   CBC W/ AUTO DIFFERENTIAL - Abnormal; Notable for the following components:       Result Value    Gran # (ANC) 8.1 (*)     Gran % 75.0 (*)     All other components within normal limits   COMPREHENSIVE METABOLIC PANEL - Abnormal; Notable for the following components:    Potassium 3.4 (*)     CO2 20 (*)     Glucose 119 (*)     Calcium 10.8 (*)     Total Bilirubin 1.2 (*)     All other components within normal limits   LIPASE   URINALYSIS, REFLEX TO URINE CULTURE   POCT URINE PREGNANCY          Imaging Results    None          Medications   sodium chloride 0.9% bolus 1,000 mL 1,000 mL (1,000 mLs Intravenous New Bag 3/31/24 1849)   ondansetron injection 4 mg (4 mg Intravenous Given 3/31/24 1849)   dicyclomine capsule 20 mg (20 mg Oral Given 3/31/24 1918)     Medical Decision Making  Kate Bailey is a 28 y.o. female with no relevant PHMx that presents to the ED for abdominal pain that began about 3 days ago. The patient notes associated symptoms of nausea, vomiting, diarrhea, and chills. She also  reports that she cannot keep foods or liquids down without vomiting. The patient denies use of any medications in attempt to alleviate symptoms pta. Denies any known cause or exposure to the illness. The patient denies associated symptoms of chest pain, cough, congestion, rhinorrhea, sore throat, SOB, blood in stool, or other symptoms.      Patient's chart and medical history reviewed.  Patient's vitals reviewed.  They are afebrile, no respiratory distress, nontoxic-appearing in the ED.  Differential diagnosis is considered as following  - Gastroenteritis: considered with abdominal pain and likely with nausea, vomiting, diarrhea and to assess hydration status with blood work provide IV fluids and Zofran will p.o. challenge.  If able to tolerate oral intake normal labs plan to have patient discharged home.  - ulcer/perforation:  Unlikely with No history of alcohol abuse, no history of H pylori, no history of chronic NSAID use.  - cholecystitis:  Unlikely with No right upper quadrant pain, negative Martinez's sign.   - cholangitis:  Unlikely with No fever, no right upper quadrant pain, no jaundice, unlikely  - pancreatitis:  Unlikely with no epigastric pain or tenderness.   - small bowel obstruction: Unlikely with no history of abdominal surgeries  - appendicitis:  Unlikely with No right lower quadrant pain, negative McBurney's point tenderness, negative Rovsing.  - AAA/Dissection:  Unlikely with 2+ pulses upper and lower extremities bilaterally, stable vital signs, no abdominal pulsating masses.  - Ischemic Bowel:  Unlikely with no pain out of proportion to exam, no Hx of a-fib, no hematochezia or melena.  - diverticulitis, Epiploic Appendagitis: unlikely with no LLQ pain or TTP   - UTI:  Pending UA  - Pyelonephritis: unlikely with no CVA tenderness bilaterally  - Pneumonia/Pneumothorax: considered with abdominal pain although Unlikely with clear lung sounds of all fields bilaterally.   - Pregnancy/Ectopic pregnancy:  UPT ordered for further evaluation, unlikely with no vaginal bleeding  - STD: no urethra discharge. Unlikely  Plan to evaluate patient's labs correlating with the patient's benign physical exam findings.  We will provide IV fluids and Zofran and p.o. challenge patient was able to tolerate p.o. intake and normal lab findings plan to discharge patient home with follow up with the primary care physician in the next 2-3 days.  At the end of my shift this patient case and care was discussed with Radha Mills PA-C who care is transferred over to at this time. Patient stable upon last contact.             Amount and/or Complexity of Data Reviewed  Labs: ordered.    Risk  Prescription drug management.            Scribe Attestation:   Scribe #1: I performed the above scribed service and the documentation accurately describes the services I performed. I attest to the accuracy of the note.                         I, Hernandez Mccartney PA-C, personally performed the services described in this documentation. All medical record entries made by the scribe were at my direction and in my presence. I have reviewed the chart and agree that the record reflects my personal performance and is accurate and complete.        Clinical Impression:  Final diagnoses:  [A08.4] Viral gastroenteritis (Primary)                 Hernandez Mccartney PA-C  03/31/24 1930

## 2024-04-01 NOTE — PROVIDER PROGRESS NOTES - EMERGENCY DEPT.
Encounter Date: 3/31/2024    ED Physician Progress Notes        Physician Note:   Assumed care of pt at shift change.   Feeling much better. Tolerating PO. Mild epigastric soreness. No abdominal ttp. No RUQ ttp. Considered but doubt cholecystitis, choledocholithiasis or acute abdomen. Lipase nl. Doubt pancreatitis. Mild elevation of Tbili that appears chronic. No transamintiis.   No wbc or significant electrolyte abnormality.   Likely viral syndrome  UA negative for UTI.   Will dc with meds for symptomatic treatment.

## 2024-04-01 NOTE — DISCHARGE INSTRUCTIONS
Thank you for coming to our Emergency Department today. It is important to remember that some problems or medical conditions are difficult to diagnose and may not be found or addressed during your Emergency Department visit.  These conditions often start with non-specific symptoms and can only be diagnosed on follow up visits with your primary care physician or specialist when the symptoms continue or change. Please remember that all medical conditions can change, and we cannot predict how you will be feeling tomorrow or the next day. Return to the ER with any questions/concerns, new/concerning symptoms, worsening or failure to improve.   Be sure to follow up with your primary care doctor and review all labs/imaging/tests that were performed during your ER visit with them. It is very common for us to identify non-emergent incidental findings which must be followed up with your primary care physician.  Some labs/imaging/tests may be outside of the normal range, and require non-emergent follow-up and/or further investigation/treatment/procedures/testing to help diagnose/exclude/prevent complications or other potentially serious medical conditions. Some abnormalities may not have been discussed or addressed during your ER visit. Some lab results may not return during your ER visit but can be accessible by downloading the free [facility] Mychart molly or by visiting [email or web address] . It is important for you to review all labs/imaging/tests which are outside of the normal range with your physician.  An ER visit does not replace a primary care visit, and many screening tests or follow-up tests cannot be ordered by an ER doctor or performed by the ER. Some tests may even require pre-approval.  If you do not have a primary care doctor, you may contact the one listed on your discharge paperwork or you may also call the [facility] Appointment Desk at [phone] , or [address] [place] [address] at  [phone] to schedule an  appointment, or establish care with a primary care doctor or even a specialist and to obtain information about local resources. It is important to your health that you have a primary care doctor.  Please take all medications as directed. We have done our best to select a medication for you that will treat your condition however, all medications may potentially have side-effects and it is impossible to predict which medications may give you side-effects or what those side-effects (if any) those medications may give you.  If you feel that you are having a negative effect or side-effect of any medication you should stop taking those medications immediately and seek medical attention. If you feel that you are having a life-threatening reaction call 911.  Do not drive, swim, climb to height, take a bath, operate heavy machinery, drink alcohol or take potentially sedating medications, sign any legal documents or make any important decisions for 24 hours if you have received any pain medications, sedatives or mood altering drugs during your ER visit or within 24 hours of taking them if they have been prescribed to you.   You can find additional resources for Dentists, hearing aids, durable medical equipment, low cost pharmacies and other resources at [email or web address]  Patient agrees with this plan. Discussed with her strict return precautions, she verbalized understanding. Patient is stable for discharge.

## 2024-06-20 ENCOUNTER — HOSPITAL ENCOUNTER (EMERGENCY)
Facility: HOSPITAL | Age: 29
Discharge: LEFT BEFORE DISCHARGE | End: 2024-06-20
Attending: EMERGENCY MEDICINE
Payer: MEDICAID

## 2024-06-20 VITALS
OXYGEN SATURATION: 99 % | DIASTOLIC BLOOD PRESSURE: 61 MMHG | WEIGHT: 110 LBS | RESPIRATION RATE: 20 BRPM | HEIGHT: 60 IN | TEMPERATURE: 98 F | BODY MASS INDEX: 21.6 KG/M2 | SYSTOLIC BLOOD PRESSURE: 128 MMHG | HEART RATE: 74 BPM

## 2024-06-20 DIAGNOSIS — Z53.29 LEFT AGAINST MEDICAL ADVICE: Primary | ICD-10-CM

## 2024-06-20 DIAGNOSIS — R11.2 NAUSEA AND VOMITING, UNSPECIFIED VOMITING TYPE: ICD-10-CM

## 2024-06-20 DIAGNOSIS — R10.84 GENERALIZED ABDOMINAL PAIN: ICD-10-CM

## 2024-06-20 LAB
ALBUMIN SERPL BCP-MCNC: 4.8 G/DL (ref 3.5–5.2)
ALP SERPL-CCNC: 73 U/L (ref 55–135)
ALT SERPL W/O P-5'-P-CCNC: 17 U/L (ref 10–44)
AMPHET+METHAMPHET UR QL: NEGATIVE
ANION GAP SERPL CALC-SCNC: 13 MMOL/L (ref 8–16)
AST SERPL-CCNC: 18 U/L (ref 10–40)
B-HCG UR QL: NEGATIVE
BACTERIA #/AREA URNS HPF: ABNORMAL /HPF
BARBITURATES UR QL SCN>200 NG/ML: NEGATIVE
BASOPHILS # BLD AUTO: 0 K/UL (ref 0–0.2)
BASOPHILS NFR BLD: 0 % (ref 0–1.9)
BENZODIAZ UR QL SCN>200 NG/ML: NEGATIVE
BILIRUB SERPL-MCNC: 0.9 MG/DL (ref 0.1–1)
BILIRUB UR QL STRIP: NEGATIVE
BUN SERPL-MCNC: 13 MG/DL (ref 6–20)
BZE UR QL SCN: NEGATIVE
CALCIUM SERPL-MCNC: 10.4 MG/DL (ref 8.7–10.5)
CANNABINOIDS UR QL SCN: ABNORMAL
CHLORIDE SERPL-SCNC: 103 MMOL/L (ref 95–110)
CLARITY UR: ABNORMAL
CO2 SERPL-SCNC: 22 MMOL/L (ref 23–29)
COLOR UR: YELLOW
CREAT SERPL-MCNC: 0.9 MG/DL (ref 0.5–1.4)
CREAT UR-MCNC: 385.5 MG/DL (ref 15–325)
CTP QC/QA: YES
DIFFERENTIAL METHOD BLD: ABNORMAL
EOSINOPHIL # BLD AUTO: 0 K/UL (ref 0–0.5)
EOSINOPHIL NFR BLD: 0 % (ref 0–8)
ERYTHROCYTE [DISTWIDTH] IN BLOOD BY AUTOMATED COUNT: 12.6 % (ref 11.5–14.5)
EST. GFR  (NO RACE VARIABLE): >60 ML/MIN/1.73 M^2
GLUCOSE SERPL-MCNC: 152 MG/DL (ref 70–110)
GLUCOSE UR QL STRIP: ABNORMAL
HCT VFR BLD AUTO: 39.1 % (ref 37–48.5)
HGB BLD-MCNC: 13.8 G/DL (ref 12–16)
HGB UR QL STRIP: NEGATIVE
HYALINE CASTS #/AREA URNS LPF: 2 /LPF
IMM GRANULOCYTES # BLD AUTO: 0.05 K/UL (ref 0–0.04)
IMM GRANULOCYTES NFR BLD AUTO: 0.4 % (ref 0–0.5)
KETONES UR QL STRIP: ABNORMAL
LEUKOCYTE ESTERASE UR QL STRIP: NEGATIVE
LIPASE SERPL-CCNC: 11 U/L (ref 4–60)
LYMPHOCYTES # BLD AUTO: 0.9 K/UL (ref 1–4.8)
LYMPHOCYTES NFR BLD: 7.9 % (ref 18–48)
MCH RBC QN AUTO: 31.2 PG (ref 27–31)
MCHC RBC AUTO-ENTMCNC: 35.3 G/DL (ref 32–36)
MCV RBC AUTO: 88 FL (ref 82–98)
METHADONE UR QL SCN>300 NG/ML: NEGATIVE
MICROSCOPIC COMMENT: ABNORMAL
MONOCYTES # BLD AUTO: 0.5 K/UL (ref 0.3–1)
MONOCYTES NFR BLD: 4.6 % (ref 4–15)
NEUTROPHILS # BLD AUTO: 9.8 K/UL (ref 1.8–7.7)
NEUTROPHILS NFR BLD: 87.1 % (ref 38–73)
NITRITE UR QL STRIP: NEGATIVE
NRBC BLD-RTO: 0 /100 WBC
OPIATES UR QL SCN: NEGATIVE
PCP UR QL SCN>25 NG/ML: NEGATIVE
PH UR STRIP: 6 [PH] (ref 5–8)
PLATELET # BLD AUTO: 259 K/UL (ref 150–450)
PMV BLD AUTO: 10.5 FL (ref 9.2–12.9)
POTASSIUM SERPL-SCNC: 3.4 MMOL/L (ref 3.5–5.1)
PROT SERPL-MCNC: 8 G/DL (ref 6–8.4)
PROT UR QL STRIP: ABNORMAL
RBC # BLD AUTO: 4.43 M/UL (ref 4–5.4)
RBC #/AREA URNS HPF: 5 /HPF (ref 0–4)
SODIUM SERPL-SCNC: 138 MMOL/L (ref 136–145)
SP GR UR STRIP: >1.03 (ref 1–1.03)
SQUAMOUS #/AREA URNS HPF: 1 /HPF
TOXICOLOGY INFORMATION: ABNORMAL
URN SPEC COLLECT METH UR: ABNORMAL
UROBILINOGEN UR STRIP-ACNC: NEGATIVE EU/DL
WBC # BLD AUTO: 11.23 K/UL (ref 3.9–12.7)
WBC #/AREA URNS HPF: 6 /HPF (ref 0–5)
YEAST URNS QL MICRO: ABNORMAL

## 2024-06-20 PROCEDURE — 25500020 PHARM REV CODE 255

## 2024-06-20 PROCEDURE — 96374 THER/PROPH/DIAG INJ IV PUSH: CPT

## 2024-06-20 PROCEDURE — 85025 COMPLETE CBC W/AUTO DIFF WBC: CPT

## 2024-06-20 PROCEDURE — 81025 URINE PREGNANCY TEST: CPT

## 2024-06-20 PROCEDURE — 96375 TX/PRO/DX INJ NEW DRUG ADDON: CPT

## 2024-06-20 PROCEDURE — 96361 HYDRATE IV INFUSION ADD-ON: CPT

## 2024-06-20 PROCEDURE — 80307 DRUG TEST PRSMV CHEM ANLYZR: CPT

## 2024-06-20 PROCEDURE — 63600175 PHARM REV CODE 636 W HCPCS

## 2024-06-20 PROCEDURE — 83690 ASSAY OF LIPASE: CPT

## 2024-06-20 PROCEDURE — 25000003 PHARM REV CODE 250

## 2024-06-20 PROCEDURE — 99285 EMERGENCY DEPT VISIT HI MDM: CPT | Mod: 25

## 2024-06-20 PROCEDURE — 80053 COMPREHEN METABOLIC PANEL: CPT

## 2024-06-20 PROCEDURE — 81000 URINALYSIS NONAUTO W/SCOPE: CPT | Mod: 59

## 2024-06-20 RX ORDER — KETOROLAC TROMETHAMINE 30 MG/ML
10 INJECTION, SOLUTION INTRAMUSCULAR; INTRAVENOUS
Status: COMPLETED | OUTPATIENT
Start: 2024-06-20 | End: 2024-06-20

## 2024-06-20 RX ORDER — ONDANSETRON HYDROCHLORIDE 2 MG/ML
4 INJECTION, SOLUTION INTRAVENOUS
Status: COMPLETED | OUTPATIENT
Start: 2024-06-20 | End: 2024-06-20

## 2024-06-20 RX ORDER — AMOXICILLIN AND CLAVULANATE POTASSIUM 875; 125 MG/1; MG/1
1 TABLET, FILM COATED ORAL EVERY 8 HOURS
Qty: 30 TABLET | Refills: 0 | Status: SHIPPED | OUTPATIENT
Start: 2024-06-20 | End: 2024-06-30

## 2024-06-20 RX ADMIN — ONDANSETRON 4 MG: 2 INJECTION INTRAMUSCULAR; INTRAVENOUS at 06:06

## 2024-06-20 RX ADMIN — SODIUM CHLORIDE 1000 ML: 9 INJECTION, SOLUTION INTRAVENOUS at 05:06

## 2024-06-20 RX ADMIN — IOHEXOL 75 ML: 350 INJECTION, SOLUTION INTRAVENOUS at 06:06

## 2024-06-20 RX ADMIN — KETOROLAC TROMETHAMINE 10 MG: 30 INJECTION, SOLUTION INTRAMUSCULAR at 06:06

## 2024-06-20 NOTE — ED PROVIDER NOTES
"Encounter Date: 6/20/2024    SCRIBE #1 NOTE: I, Maribell Barbara, am scribing for, and in the presence of,  Alayna Holdsworth, PA-C.       History     Chief Complaint   Patient presents with    Abdominal Pain     Generalized abdominal pain x 2 days. Reports N/V. Denies urinary s/s. States "entire body is tingling." Denies meds pta. States unable to keep anything down.      30 yo F w/ PMHx of PCOS, anemia, migraine headaches, presenting to the ED for generalized abdominal pain since yesterday AM. She describes her pain as throbbing, constant, rating 9/10 in severity, worse with vomiting. She reports associated nausea, intractable vomiting. Notes some episodes of blood tinged vomitus. Also notes "cold sweats". No other exacerbating or alleviating factors. Denies vaginal bleeding or discharge, diarrhea, constipation, or other associated symptoms. No sick contacts. No hx of abdominal surgeries. No alcohol or illicit drug use.     The history is provided by the patient.     Review of patient's allergies indicates:   Allergen Reactions    Diflucan [fluconazole]      Facial Swelling     Past Medical History:   Diagnosis Date    Migraine     PCOS (polycystic ovarian syndrome)     Recurrent epistaxis      Past Surgical History:   Procedure Laterality Date    ADENOIDECTOMY      got eggs frozen Bilateral 07/2018    TONSILLECTOMY       Family History   Problem Relation Name Age of Onset    Cancer Paternal Grandfather          breast     Hypertension Maternal Grandmother       Social History     Tobacco Use    Smoking status: Never    Smokeless tobacco: Never   Substance Use Topics    Alcohol use: No    Drug use: No     Review of Systems   Constitutional:  Negative for chills, diaphoresis and fever.        +"cold sweats"   Respiratory:  Negative for shortness of breath.    Cardiovascular:  Negative for chest pain.   Gastrointestinal:  Positive for abdominal pain, nausea and vomiting. Negative for blood in stool, constipation and " diarrhea.   Genitourinary:  Negative for decreased urine volume, difficulty urinating, dysuria, frequency, hematuria, urgency, vaginal bleeding, vaginal discharge and vaginal pain.   Musculoskeletal:  Negative for back pain and myalgias.   Skin:  Negative for rash.   Neurological:  Negative for dizziness, weakness, light-headedness and headaches.       Physical Exam     Initial Vitals [06/20/24 1709]   BP Pulse Resp Temp SpO2   128/61 74 20 98.4 °F (36.9 °C) 99 %      MAP       --         Physical Exam    Nursing note and vitals reviewed.  Constitutional: Vital signs are normal. She appears well-developed and well-nourished. She is not diaphoretic. She is active. She does not appear ill. She appears distressed.   HENT:   Head: Normocephalic and atraumatic.   Right Ear: External ear normal.   Left Ear: External ear normal.   Nose: Nose normal.   Eyes: Conjunctivae, EOM and lids are normal. Pupils are equal, round, and reactive to light. Right eye exhibits no discharge. Left eye exhibits no discharge.   Neck: Phonation normal. Neck supple.   Normal range of motion.   Full passive range of motion without pain.     Cardiovascular:  Normal rate and regular rhythm.           Pulmonary/Chest: Effort normal and breath sounds normal. No respiratory distress.   Abdominal: Abdomen is soft. She exhibits no distension and no mass. There is generalized abdominal tenderness. There is guarding. There is no rebound.   Musculoskeletal:         General: Normal range of motion.      Cervical back: Full passive range of motion without pain, normal range of motion and neck supple.     Neurological: She is alert and oriented to person, place, and time. GCS eye subscore is 4. GCS verbal subscore is 5. GCS motor subscore is 6.   Skin: Skin is dry. Capillary refill takes less than 2 seconds.         ED Course   Procedures  Labs Reviewed   URINALYSIS, REFLEX TO URINE CULTURE - Abnormal; Notable for the following components:       Result Value     Appearance, UA Hazy (*)     Specific Gravity, UA >1.030 (*)     Protein, UA 1+ (*)     Glucose, UA 3+ (*)     Ketones, UA 3+ (*)     All other components within normal limits    Narrative:     Specimen Source->Urine   CBC W/ AUTO DIFFERENTIAL - Abnormal; Notable for the following components:    MCH 31.2 (*)     Gran # (ANC) 9.8 (*)     Immature Grans (Abs) 0.05 (*)     Lymph # 0.9 (*)     Gran % 87.1 (*)     Lymph % 7.9 (*)     All other components within normal limits   COMPREHENSIVE METABOLIC PANEL - Abnormal; Notable for the following components:    Potassium 3.4 (*)     CO2 22 (*)     Glucose 152 (*)     All other components within normal limits   DRUG SCREEN PANEL, URINE EMERGENCY - Abnormal; Notable for the following components:    THC Presumptive Positive (*)     Creatinine, Urine 385.5 (*)     All other components within normal limits    Narrative:     Specimen Source->Urine   URINALYSIS MICROSCOPIC - Abnormal; Notable for the following components:    RBC, UA 5 (*)     WBC, UA 6 (*)     Hyaline Casts, UA 2 (*)     All other components within normal limits    Narrative:     Specimen Source->Urine   LIPASE   POCT URINE PREGNANCY          Imaging Results              CT Abdomen Pelvis With IV Contrast NO Oral Contrast (In process)  Result time 06/20/24 18:38:46                     Medications   sodium chloride 0.9% bolus 1,000 mL 1,000 mL (1,000 mLs Intravenous New Bag 6/20/24 1755)   ondansetron injection 4 mg (4 mg Intravenous Given 6/20/24 1801)   ketorolac injection 9.999 mg (9.999 mg Intravenous Given 6/20/24 1801)   iohexoL (OMNIPAQUE 350) injection 75 mL (75 mLs Intravenous Given 6/20/24 1841)     Medical Decision Making  30 yo F w/ PMHx of PCOS, anemia, migraine headaches, presenting to the ED for generalized abdominal pain since yesterday AM.   Patient's chart and medical history reviewed.    Ddx:  UTI  Cannabidiol hyperemesis syndrome  Pancreatitis  Cholecystitis  GERD  Appendicitis    Patient's  vitals reviewed.  Afebrile, no respiratory distress, and nontoxic-appearing in the ED. Patient had generalized abdominal ttp with guarding. UPT negative. Patient given toradol, zofran, and started on fluids.  CBC showed upper end of normal white blood cell count of 11.23 with a immature granulocyte count of 0.05 and a granulocyte count of 9.8 and 87.1%.  UA showed no evidence of infection, dehydration noted with 3+ ketones, 3+ glucose, and 5 red blood cells.  Drug screen showed positive for THC.  CMP showed elevated glucose of 152 and very milk hypokalemia of 3.4, otherwise unremarkable.  No anion gap.  Lipase in normal range, pancreatitis unlikely.  Patient states she is feeling better and ready to go home.  Discussed with patient CT scan is not back and she will need to sign out AMA.  Discussed with patient this is could include severe permanent disability, severe infection, or death, she verbalized understanding.  Patient signed AMA form.  Patient stable at time of AMA.    Amount and/or Complexity of Data Reviewed  Labs: ordered. Decision-making details documented in ED Course.  Radiology: ordered.    Risk  Prescription drug management.            Scribe Attestation:   Scribe #1: I performed the above scribed service and the documentation accurately describes the services I performed. I attest to the accuracy of the note.                           I, Alayna Holdsworth,PA-C, personally performed the services described in this documentation. All medical record entries made by the scribe were at my direction and in my presence. I have reviewed the chart and agree that the record reflects my personal performance and is accurate and complete.      Clinical Impression:  Final diagnoses:  [Z53.29] Left against medical advice (Primary)  [R10.84] Generalized abdominal pain  [R11.2] Nausea and vomiting, unspecified vomiting type          ED Disposition Condition    AMA Stable                Holdsworth, Alayna,  DIDI  06/20/24 1903

## 2024-06-20 NOTE — LETTER
Patient: Kate Bailey  YOB: 1995  Date: 6/20/2024 Time: 7:06 PM  Location: [facility]    Leaving the Hospital Against Medical Advice    Chart #:[id number]    This will certify that I, the undersigned,    ______________________________________________________________________    A patient in the above named medical center, having requested discharge and removal from the medical center against the advice of my attending physician(s), hereby release [facility], its physicians, officers and employees, severally and individually, from any and all liability of any nature whatsoever for any injury or harm or complication of any kind that may result directly or indirectly, by reason of my terminating my stay as a patient at [facility] and my departure from [facility], and hereby waive any and all rights of action I may now have or later acquire as a result of my voluntary departure from [facility] and the termination of my stay as a patient therein.    This release is made with the full knowledge of the danger that may result from the action which I am taking.      Date:_______________________                         ___________________________                                                                                    Patient/Legal Representative    Witness:        ____________________________                          ___________________________  Nurse                                                                        Physician

## 2024-06-24 ENCOUNTER — TELEPHONE (OUTPATIENT)
Dept: GASTROENTEROLOGY | Facility: CLINIC | Age: 29
End: 2024-06-24
Payer: MEDICAID

## 2024-06-24 NOTE — TELEPHONE ENCOUNTER
----- Message from Emily Avalos sent at 6/24/2024  9:14 AM CDT -----  Regarding: Appt Access  Contact: [phone]  CONSULT/ADVISORY    Name of Caller:  BENY CASILLAS [[id number]]    Contact Preference:   [phone]    Nature of Call:  Pt was seen in the [facility] ER on 06/20/2024 for diverticulitis.  States she's still vomiting, abdominal pain, and can't hold any food down.  Pt is requesting to be seen as soon as possible.  Please call.

## 2024-06-24 NOTE — TELEPHONE ENCOUNTER
----- Message from Gely Chavez sent at 6/24/2024  1:38 PM CDT -----  Regarding: Returning Missed Call  Contact: [phone]  Returning a Missed Call    Caller: Patient     Returning call to: Elizabeth Gaines    Nature of call: In regards to getting pt scheduled for diverticulitis

## 2024-09-27 ENCOUNTER — HOSPITAL ENCOUNTER (EMERGENCY)
Facility: HOSPITAL | Age: 29
Discharge: HOME | End: 2024-09-27
Attending: EMERGENCY MEDICINE
Payer: COMMERCIAL

## 2024-09-27 VITALS
DIASTOLIC BLOOD PRESSURE: 60 MMHG | OXYGEN SATURATION: 99 % | HEART RATE: 64 BPM | TEMPERATURE: 99 F | BODY MASS INDEX: 21.6 KG/M2 | RESPIRATION RATE: 20 BRPM | WEIGHT: 110 LBS | HEIGHT: 60 IN | SYSTOLIC BLOOD PRESSURE: 110 MMHG

## 2024-09-27 DIAGNOSIS — K52.9 COLITIS: Primary | ICD-10-CM

## 2024-09-27 DIAGNOSIS — R06.02 SHORTNESS OF BREATH: ICD-10-CM

## 2024-09-27 DIAGNOSIS — R10.9 ABDOMINAL PAIN, UNSPECIFIED ABDOMINAL LOCATION: ICD-10-CM

## 2024-09-27 LAB
ALBUMIN SERPL BCP-MCNC: 4.9 G/DL (ref 3.5–5.2)
ALLENS TEST: ABNORMAL
ALP SERPL-CCNC: 76 U/L (ref 55–135)
ALT SERPL W/O P-5'-P-CCNC: 21 U/L (ref 10–44)
ANION GAP SERPL CALC-SCNC: 16 MMOL/L (ref 8–16)
ANION GAP SERPL CALC-SCNC: 19 MMOL/L (ref 8–16)
AST SERPL-CCNC: 20 U/L (ref 10–40)
B-HCG UR QL: NEGATIVE
BACTERIA #/AREA URNS HPF: NORMAL /HPF
BASOPHILS # BLD AUTO: 0.01 K/UL (ref 0–0.2)
BASOPHILS NFR BLD: 0.1 % (ref 0–1.9)
BILIRUB SERPL-MCNC: 0.8 MG/DL (ref 0.1–1)
BILIRUB UR QL STRIP: NEGATIVE
BUN SERPL-MCNC: 12 MG/DL (ref 6–30)
BUN SERPL-MCNC: 13 MG/DL (ref 6–20)
CALCIUM SERPL-MCNC: 10.8 MG/DL (ref 8.7–10.5)
CHLORIDE SERPL-SCNC: 101 MMOL/L (ref 95–110)
CHLORIDE SERPL-SCNC: 101 MMOL/L (ref 95–110)
CLARITY UR: CLEAR
CO2 SERPL-SCNC: 22 MMOL/L (ref 23–29)
COLOR UR: YELLOW
CREAT SERPL-MCNC: 0.7 MG/DL (ref 0.5–1.4)
CREAT SERPL-MCNC: 0.9 MG/DL (ref 0.5–1.4)
CTP QC/QA: YES
DIFFERENTIAL METHOD BLD: ABNORMAL
EOSINOPHIL # BLD AUTO: 0 K/UL (ref 0–0.5)
EOSINOPHIL NFR BLD: 0.1 % (ref 0–8)
ERYTHROCYTE [DISTWIDTH] IN BLOOD BY AUTOMATED COUNT: 12.5 % (ref 11.5–14.5)
EST. GFR  (NO RACE VARIABLE): >60 ML/MIN/1.73 M^2
GLUCOSE SERPL-MCNC: 155 MG/DL (ref 70–110)
GLUCOSE SERPL-MCNC: 156 MG/DL (ref 70–110)
GLUCOSE UR QL STRIP: ABNORMAL
HCO3 UR-SCNC: 21.7 MMOL/L (ref 24–28)
HCO3 UR-SCNC: 21.8 MMOL/L (ref 24–28)
HCT VFR BLD AUTO: 43.8 % (ref 37–48.5)
HCT VFR BLD CALC: 43 %PCV (ref 36–54)
HGB BLD-MCNC: 14.9 G/DL (ref 12–16)
HGB UR QL STRIP: NEGATIVE
HYALINE CASTS #/AREA URNS LPF: 0 /LPF
IMM GRANULOCYTES # BLD AUTO: 0.05 K/UL (ref 0–0.04)
IMM GRANULOCYTES NFR BLD AUTO: 0.5 % (ref 0–0.5)
KETONES UR QL STRIP: ABNORMAL
LEUKOCYTE ESTERASE UR QL STRIP: NEGATIVE
LIPASE SERPL-CCNC: 9 U/L (ref 4–60)
LYMPHOCYTES # BLD AUTO: 0.7 K/UL (ref 1–4.8)
LYMPHOCYTES NFR BLD: 7 % (ref 18–48)
MCH RBC QN AUTO: 31.4 PG (ref 27–31)
MCHC RBC AUTO-ENTMCNC: 34 G/DL (ref 32–36)
MCV RBC AUTO: 92 FL (ref 82–98)
MICROSCOPIC COMMENT: NORMAL
MONOCYTES # BLD AUTO: 0.4 K/UL (ref 0.3–1)
MONOCYTES NFR BLD: 4.2 % (ref 4–15)
NEUTROPHILS # BLD AUTO: 9.3 K/UL (ref 1.8–7.7)
NEUTROPHILS NFR BLD: 88.1 % (ref 38–73)
NITRITE UR QL STRIP: NEGATIVE
NRBC BLD-RTO: 0 /100 WBC
PCO2 BLDA: 26.4 MMHG (ref 35–45)
PCO2 BLDA: 30.9 MMHG (ref 35–45)
PH SMN: 7.46 [PH] (ref 7.35–7.45)
PH SMN: 7.52 [PH] (ref 7.35–7.45)
PH UR STRIP: 6 [PH] (ref 5–8)
PLATELET # BLD AUTO: 306 K/UL (ref 150–450)
PMV BLD AUTO: 10.4 FL (ref 9.2–12.9)
PO2 BLDA: 19 MMHG (ref 40–60)
PO2 BLDA: 24 MMHG (ref 40–60)
POC BE: -1 MMOL/L
POC BE: 0 MMOL/L
POC IONIZED CALCIUM: 1.19 MMOL/L (ref 1.06–1.42)
POC SATURATED O2: 34 % (ref 95–100)
POC SATURATED O2: 51 % (ref 95–100)
POC TCO2 (MEASURED): 21 MMOL/L (ref 23–29)
POC TCO2: 23 MMOL/L (ref 24–29)
POC TCO2: 23 MMOL/L (ref 24–29)
POTASSIUM BLD-SCNC: 3.4 MMOL/L (ref 3.5–5.1)
POTASSIUM SERPL-SCNC: 3.4 MMOL/L (ref 3.5–5.1)
PROT SERPL-MCNC: 8.3 G/DL (ref 6–8.4)
PROT UR QL STRIP: ABNORMAL
RBC # BLD AUTO: 4.75 M/UL (ref 4–5.4)
RBC #/AREA URNS HPF: 1 /HPF (ref 0–4)
SAMPLE: ABNORMAL
SITE: ABNORMAL
SODIUM BLD-SCNC: 137 MMOL/L (ref 136–145)
SODIUM SERPL-SCNC: 139 MMOL/L (ref 136–145)
SP GR UR STRIP: >1.03 (ref 1–1.03)
SQUAMOUS #/AREA URNS HPF: 10 /HPF
URN SPEC COLLECT METH UR: ABNORMAL
UROBILINOGEN UR STRIP-ACNC: NEGATIVE EU/DL
WBC # BLD AUTO: 10.5 K/UL (ref 3.9–12.7)
WBC #/AREA URNS HPF: 1 /HPF (ref 0–5)
YEAST URNS QL MICRO: NORMAL

## 2024-09-27 PROCEDURE — 84132 ASSAY OF SERUM POTASSIUM: CPT

## 2024-09-27 PROCEDURE — 93005 ELECTROCARDIOGRAM TRACING: CPT

## 2024-09-27 PROCEDURE — 82565 ASSAY OF CREATININE: CPT | Mod: 91

## 2024-09-27 PROCEDURE — 84295 ASSAY OF SERUM SODIUM: CPT

## 2024-09-27 PROCEDURE — 82565 ASSAY OF CREATININE: CPT

## 2024-09-27 PROCEDURE — 85014 HEMATOCRIT: CPT

## 2024-09-27 PROCEDURE — 96375 TX/PRO/DX INJ NEW DRUG ADDON: CPT

## 2024-09-27 PROCEDURE — 82330 ASSAY OF CALCIUM: CPT

## 2024-09-27 PROCEDURE — 81000 URINALYSIS NONAUTO W/SCOPE: CPT

## 2024-09-27 PROCEDURE — 83690 ASSAY OF LIPASE: CPT

## 2024-09-27 PROCEDURE — 63600175 PHARM REV CODE 636 W HCPCS

## 2024-09-27 PROCEDURE — 99285 EMERGENCY DEPT VISIT HI MDM: CPT | Mod: 25

## 2024-09-27 PROCEDURE — 82803 BLOOD GASES ANY COMBINATION: CPT

## 2024-09-27 PROCEDURE — 96374 THER/PROPH/DIAG INJ IV PUSH: CPT

## 2024-09-27 PROCEDURE — 81025 URINE PREGNANCY TEST: CPT

## 2024-09-27 PROCEDURE — 93010 ELECTROCARDIOGRAM REPORT: CPT | Mod: ,,, | Performed by: INTERNAL MEDICINE

## 2024-09-27 PROCEDURE — 25500020 PHARM REV CODE 255: Performed by: EMERGENCY MEDICINE

## 2024-09-27 PROCEDURE — 99900035 HC TECH TIME PER 15 MIN (STAT)

## 2024-09-27 PROCEDURE — 82962 GLUCOSE BLOOD TEST: CPT

## 2024-09-27 PROCEDURE — 96361 HYDRATE IV INFUSION ADD-ON: CPT

## 2024-09-27 PROCEDURE — 80053 COMPREHEN METABOLIC PANEL: CPT

## 2024-09-27 PROCEDURE — 25000003 PHARM REV CODE 250

## 2024-09-27 PROCEDURE — 85025 COMPLETE CBC W/AUTO DIFF WBC: CPT

## 2024-09-27 RX ORDER — ONDANSETRON 4 MG/1
4 TABLET, ORALLY DISINTEGRATING ORAL EVERY 6 HOURS PRN
Qty: 30 TABLET | Refills: 2 | Status: SHIPPED | OUTPATIENT
Start: 2024-09-27

## 2024-09-27 RX ORDER — HYDROMORPHONE HYDROCHLORIDE 1 MG/ML
1 INJECTION, SOLUTION INTRAMUSCULAR; INTRAVENOUS; SUBCUTANEOUS
Status: COMPLETED | OUTPATIENT
Start: 2024-09-27 | End: 2024-09-27

## 2024-09-27 RX ORDER — ONDANSETRON HYDROCHLORIDE 2 MG/ML
4 INJECTION, SOLUTION INTRAVENOUS
Status: COMPLETED | OUTPATIENT
Start: 2024-09-27 | End: 2024-09-27

## 2024-09-27 RX ADMIN — SODIUM CHLORIDE 1000 ML: 9 INJECTION, SOLUTION INTRAVENOUS at 09:09

## 2024-09-27 RX ADMIN — IOHEXOL 75 ML: 350 INJECTION, SOLUTION INTRAVENOUS at 10:09

## 2024-09-27 RX ADMIN — HYDROMORPHONE HYDROCHLORIDE 1 MG: 1 INJECTION, SOLUTION INTRAMUSCULAR; INTRAVENOUS; SUBCUTANEOUS at 09:09

## 2024-09-27 RX ADMIN — ONDANSETRON 4 MG: 2 INJECTION INTRAMUSCULAR; INTRAVENOUS at 09:09

## 2024-09-27 NOTE — ED NOTES
Pt presents to Ed with c/o abd pain, n/v/d, muscle weakness and back pain. Pt reports that she has been dx with an inflamed colon and has GI apt next month. States she recently completed abx regimen.   Pain began 3 days ago and is associated with n /v/d.  Denies any oral intake x 4 days. Pain is to umbilicus and opts abd is diffusely tender. Pt endorses bloody specs in emesis.   Muscle weakness began 2 days ago. Pt reports feeling like a noodle. States she fell in the shower today hitting her head and shoulder. Denies loc. C/o pain to thoracic and lumbar back.

## 2024-09-27 NOTE — ED NOTES
Pt now calm. Respirations even and unlabored. Pain 0/10. VBG redrawn. RT aware. Pt marked ready for CT.

## 2024-09-27 NOTE — Clinical Note
"Kate"Jocelyn Bailey was seen and treated in our emergency department on 9/27/2024.  She may return to work on 09/30/2024.       If you have any questions or concerns, please don't hesitate to call.      Vasquez Mccrary MD"

## 2024-09-27 NOTE — DISCHARGE INSTRUCTIONS
Please keep your appointment with GI to evaluate and treat your diverticulosis.  Please return to the ED for any of the following:  Severe abdominal pain that is not controllable with medication, vomiting that will not stop, blood in your vomit or stool.

## 2024-09-27 NOTE — ED PROVIDER NOTES
"Encounter Date: 9/27/2024       History     Chief Complaint   Patient presents with    Abdominal Pain     Pt reports generalized abd pain, N/V/D, "cold sweats", and generalized weakness x3 days. Pt reports GI hx, has a GI appt on 10/10. Pt reports she has been so weak that she keeps falling, fell in the shower this AM hitting her head and right shoulder. Pt had to be held up by her mother and  while walking into triage.     Ronda Bailey is a 29-year-old female with history of PCOS presenting to the ED with 2 days of weakness and severe abdominal pain.  Patient states that she began noticing crampy upper abdominal pain roughly 3 days ago, but it became much more severe 2 days ago.  Is associated with decreased appetite, nausea, and vomiting.  Patient states that 1 episode of vomiting had dark green color to it, but was not repeated.  Patient also states that she saw" flecks" of bloody in 1 episode of vomit, but she has had this before.  Patient denies increase in pain after eating. Patient states she is currently undergoing GI workup for possible diverticulosis, and has had this pain before in the past.  Patient denies fever, chills, recent illness/infection, chest pain, shortness of breath, palpitations, urinary symptoms, abnormal vaginal bleeding/discharge, new rashes.  Patient denies any recent diarrhea.    Of note, patient has history of PCOS.  He is not sure of when her last period was, but thinks it was over 3 months ago.  Patient denies possibility that she might be pregnant.        Review of patient's allergies indicates:   Allergen Reactions    Diflucan [fluconazole]      Facial Swelling     Past Medical History:   Diagnosis Date    Migraine     PCOS (polycystic ovarian syndrome)     Recurrent epistaxis      Past Surgical History:   Procedure Laterality Date    ADENOIDECTOMY      got eggs frozen Bilateral 07/2018    TONSILLECTOMY       Family History   Problem Relation Name " Age of Onset    Cancer Paternal Grandfather          breast     Hypertension Maternal Grandmother       Social History     Tobacco Use    Smoking status: Never    Smokeless tobacco: Never   Substance Use Topics    Alcohol use: No    Drug use: No     Review of Systems  10-point Review of Systems was performed and is negative/non-contributory unless otherwise stated in above HPI.    Physical Exam     Initial Vitals [09/27/24 0854]   BP Pulse Resp Temp SpO2   125/75 (!) 59 18 99.1 °F (37.3 °C) 100 %      MAP       --         Physical Exam    Constitutional: She appears well-developed and well-nourished. She is not diaphoretic. She appears distressed.   HENT:   Head: Normocephalic and atraumatic.   Mouth/Throat: Oropharynx is clear and moist.   Eyes: Conjunctivae and EOM are normal. Pupils are equal, round, and reactive to light. No scleral icterus.   Neck: No JVD present.   Normal range of motion.  Cardiovascular:  Normal rate, regular rhythm, normal heart sounds and intact distal pulses.     Exam reveals no gallop and no friction rub.       No murmur heard.  Pulmonary/Chest: Breath sounds normal. No stridor. No respiratory distress. She has no wheezes. She has no rhonchi. She has no rales.   Abdominal: Abdomen is soft and flat. She exhibits no distension and no mass. There is generalized abdominal tenderness and tenderness in the right upper quadrant, epigastric area and periumbilical area.   Rovsing sign and Martinez sign equivocal due to patient's inability to tolerate exam   No right CVA tenderness.  No left CVA tenderness. There is guarding. There is no rebound.   Musculoskeletal:         General: No edema. Normal range of motion.      Cervical back: Normal range of motion.     Neurological: She is alert and oriented to person, place, and time.   Skin: Skin is warm and dry.         ED Course   Procedures  Labs Reviewed   CBC W/ AUTO DIFFERENTIAL - Abnormal       Result Value    WBC 10.50      RBC 4.75       Hemoglobin 14.9      Hematocrit 43.8      MCV 92      MCH 31.4 (*)     MCHC 34.0      RDW 12.5      Platelets 306      MPV 10.4      Immature Granulocytes 0.5      Gran # (ANC) 9.3 (*)     Immature Grans (Abs) 0.05 (*)     Lymph # 0.7 (*)     Mono # 0.4      Eos # 0.0      Baso # 0.01      nRBC 0      Gran % 88.1 (*)     Lymph % 7.0 (*)     Mono % 4.2      Eosinophil % 0.1      Basophil % 0.1      Differential Method Automated     COMPREHENSIVE METABOLIC PANEL - Abnormal    Sodium 139      Potassium 3.4 (*)     Chloride 101      CO2 22 (*)     Glucose 156 (*)     BUN 13      Creatinine 0.9      Calcium 10.8 (*)     Total Protein 8.3      Albumin 4.9      Total Bilirubin 0.8      Alkaline Phosphatase 76      AST 20      ALT 21      eGFR >60      Anion Gap 16     URINALYSIS, REFLEX TO URINE CULTURE - Abnormal    Specimen UA Urine, Clean Catch      Color, UA Yellow      Appearance, UA Clear      pH, UA 6.0      Specific Gravity, UA >1.030 (*)     Protein, UA 1+ (*)     Glucose, UA 3+ (*)     Ketones, UA 2+ (*)     Bilirubin (UA) Negative      Occult Blood UA Negative      Nitrite, UA Negative      Urobilinogen, UA Negative      Leukocytes, UA Negative      Narrative:     Specimen Source->Urine   ISTAT PROCEDURE - Abnormal    POC Glucose 155 (*)     POC BUN 12      POC Creatinine 0.7      POC Sodium 137      POC Potassium 3.4 (*)     POC Chloride 101      POC TCO2 (MEASURED) 21 (*)     POC Anion Gap 19 (*)     POC Ionized Calcium 1.19      POC Hematocrit 43      Sample VENOUS      Site Other      Allens Test N/A     ISTAT PROCEDURE - Abnormal    POC PH 7.523 (*)     POC PCO2 26.4 (*)     POC PO2 24 (*)     POC HCO3 21.7 (*)     POC BE 0      POC SATURATED O2 51      POC TCO2 23 (*)     Sample VENOUS      Site Other      Allens Test N/A     ISTAT PROCEDURE - Abnormal    POC PH 7.458 (*)     POC PCO2 30.9 (*)     POC PO2 19 (*)     POC HCO3 21.8 (*)     POC BE -1      POC SATURATED O2 34      POC TCO2 23 (*)     Sample  VENOUS      Site Other      Allens Test N/A     LIPASE    Lipase 9     URINALYSIS MICROSCOPIC    RBC, UA 1      WBC, UA 1      Bacteria Rare      Yeast, UA None      Squam Epithel, UA 10      Hyaline Casts, UA 0      Microscopic Comment SEE COMMENT      Narrative:     Specimen Source->Urine   POCT URINE PREGNANCY    POC Preg Test, Ur Negative       Acceptable Yes     ISTAT CHEM8          Imaging Results              CT Abdomen Pelvis With IV Contrast NO Oral Contrast (Final result)  Result time 09/27/24 11:49:46      Final result by Giuseppe Manzanares MD (09/27/24 11:49:46)                   Impression:      Mild diffuse wall prominence of the colon, potentially exaggerated by decompressed state or related to a nonspecific colitis.  Recommend correlation with clinical findings and symptoms in this patient with nonlocalized abdominal pain.    Additional findings above.      Electronically signed by: Giuseppe Manzanares MD  Date:    09/27/2024  Time:    11:49               Narrative:    EXAMINATION:  CT ABDOMEN PELVIS WITH IV CONTRAST    CLINICAL HISTORY:  Abdominal pain, acute, nonlocalized;    TECHNIQUE:  Low dose axial images, sagittal and coronal reformations were obtained from the lung bases to the pubic symphysis following the IV administration of 75 mL of Omnipaque 350 .  Oral contrast was not given.    COMPARISON:  CT abdomen pelvis, 06/20/2024.    FINDINGS:  Lower Chest:    Lung bases are clear.  Heart size is normal.    Abdomen:    Liver is normal in size and contour.  Probable focal fat deposition adjacent to the falciform ligament.  No worrisome liver mass identified on this single phase study.  Gallbladder is unremarkable. No intrahepatic biliary ductal dilatation.    Spleen is not enlarged.  Adrenal glands and pancreas are unremarkable.    The kidneys are symmetric.  No hydronephrosis. No asymmetric perinephric fat stranding.    Minimal wall thickening of the distal stomach, though this is  similar when compared with prior studies and could be exaggerated by pyloric muscle.  No small bowel obstruction.  Appendix is unremarkable.  Colonic diverticulosis.  No evidence of acute diverticulitis.  Mild diffuse colonic wall prominence potentially exaggerated by decompressed state or related to a nonspecific colitis.    No pneumoperitoneum or organized fluid collection.    No bulky retroperitoneal lymphadenopathy.    Abdominal aorta is normal in caliber without significant atherosclerosis.    Portal, splenic, and superior mesenteric veins are patent. No portal venous gas.    Pelvis:    Urinary bladder is decompressed and not well evaluated.  Uterus is unremarkable.  There are cystic changes in the bilateral adnexa, potentially related to follicles.  Small volume pelvic free fluid.    Bones and soft tissues:    No aggressive osseous lesions.  Transitional lumbosacral vertebral body at S1.  Extraperitoneal soft tissues are negative for acute finding.                                       Medications   sodium chloride 0.9% bolus 1,000 mL 1,000 mL (0 mLs Intravenous Stopped 9/27/24 1035)   HYDROmorphone injection 1 mg (1 mg Intravenous Given 9/27/24 0936)   ondansetron injection 4 mg (4 mg Intravenous Given 9/27/24 0936)   iohexoL (OMNIPAQUE 350) injection 75 mL (75 mLs Intravenous Given 9/27/24 1046)     Medical Decision Making  Kate Bailey is a 29 y.o. female with a past medical history of PCOS presenting to the ED with vomiting and abdominal pain for 4 days. History and physical exam as above. Initial vital signs stable and non-actionable. Pain and nausea addressed with Dilaudid 1 mg IV and Zofran 4 mg IV. Initial work-up to include: Labs (CBC, CMP, Lipase, VBG, UA, UPT), Imaging (CT A/P with IV contrast), EKG, IV Fluids (NS 1 L bolus)    Differential diagnosis for this patient includes, but is not limited to:  Diverticulitis, ruptured ovarian cyst (less likely given no lower abdominal pain), acute  cholecystitis (possible episode of bilious emesis, however no fever and no increase in pain with eating).  Other severe, more emergent diagnoses considered, but deemed much less likely, to include:  Appendicitis (no fever, no diarrhea, no migratory pain), ruptured ectopic pregnancy (no vaginal bleeding/discharge, no lower abdominal pain/tenderness, no vital sign instability).    Results of workup include CT with mild colonic thickening and full resolution of symptoms with 1 mg Dilaudid and 4 mg Zofran.  This raises suspicion for a nonspecific colitis versus diverticulitis/diverticulosis.  Of note, following administration of Dilaudid, patient experienced a panic attack, consistent with her stated past history of anxiety.  Grounding techniques by provider team able to largely control her symptoms, however resulted in significant hyperventilation as seen on VBG.  Patient not hypoxic by SpO2 at rest at any time during her ED visit.    At this time, patient is stable and likely safe to discharge home with routine outpatient follow-up by patient's PCP as needed for worsening symptoms.  Patient also urge to keep appointment made with GI for further evaluation and workup of her suspected diverticulosis.  Discussed return criteria and patient education with patient, who verbalized understanding. Upon discharge, patient provided with outpatient prescription for Zofran.        Amount and/or Complexity of Data Reviewed  Labs: ordered.  Radiology: ordered.    Risk  Prescription drug management.               ED Course as of 09/27/24 1400   Fri Sep 27, 2024   0930 This is a 29-year-old presents with nausea vomiting and abdominal pain.  She has been having increasing weakness and a near syncopal episode. [MH]   0938 My independent interpretation of the EKG is sinus rhythm at a rate of 60.  There is a wandering baseline but there does appear to be U-waves.  There is poor R-wave progression.  ST segment elevation or depression  concerning for infarct. [MH]   0955 CBC is normal [MH]      ED Course User Index  [MH] Dory Navarro MD                     Signed,    Vasquze Mccrary MD  Emergency Medicine, PGY-1  [facility]        Clinical Impression:  Final diagnoses:  [R06.02] Shortness of breath  [K52.9] Colitis (Primary)  [R10.9] Abdominal pain, unspecified abdominal location          ED Disposition Condition    Discharge Stable          ED Prescriptions       Medication Sig Dispense Start Date End Date Auth. Provider    ondansetron (ZOFRAN-ODT) 4 MG TbDL Take 1 tablet (4 mg total) by mouth every 6 (six) hours as needed (nausea). 30 tablet 9/27/2024 -- Vasquez Mccrary MD          Follow-up Information       Follow up With Specialties Details Why Contact Info    Hussain Swift MD Family Medicine Schedule an appointment as soon as possible for a visit in 1 week As needed, If symptoms worsen [address] [place] [address]  [phone]               Vasquez Mccrary MD  Resident  09/27/24 6147

## 2024-10-01 LAB
OHS QRS DURATION: 82 MS
OHS QTC CALCULATION: 408 MS

## 2025-05-10 ENCOUNTER — HOSPITAL ENCOUNTER (EMERGENCY)
Facility: HOSPITAL | Age: 30
Discharge: HOME | End: 2025-05-10
Attending: EMERGENCY MEDICINE
Payer: COMMERCIAL

## 2025-05-10 VITALS
RESPIRATION RATE: 18 BRPM | HEART RATE: 85 BPM | HEIGHT: 60 IN | DIASTOLIC BLOOD PRESSURE: 75 MMHG | TEMPERATURE: 99 F | SYSTOLIC BLOOD PRESSURE: 121 MMHG | BODY MASS INDEX: 21.6 KG/M2 | WEIGHT: 110 LBS | OXYGEN SATURATION: 100 %

## 2025-05-10 DIAGNOSIS — R10.31 RIGHT LOWER QUADRANT ABDOMINAL PAIN: ICD-10-CM

## 2025-05-10 DIAGNOSIS — R19.7 NAUSEA VOMITING AND DIARRHEA: ICD-10-CM

## 2025-05-10 DIAGNOSIS — R11.2 NAUSEA VOMITING AND DIARRHEA: ICD-10-CM

## 2025-05-10 DIAGNOSIS — K52.9 COLITIS: Primary | ICD-10-CM

## 2025-05-10 LAB
ALBUMIN SERPL-MCNC: 4.3 G/DL (ref 3.3–5.5)
ALBUMIN SERPL-MCNC: 4.5 G/DL (ref 3.3–5.5)
ALLENS TEST: ABNORMAL
ALP SERPL-CCNC: 60 U/L (ref 42–141)
ALP SERPL-CCNC: 69 U/L (ref 42–141)
B-HCG UR QL: NEGATIVE
BILIRUB SERPL-MCNC: 0.8 MG/DL (ref 0.2–1.6)
BILIRUB SERPL-MCNC: 0.9 MG/DL (ref 0.2–1.6)
BILIRUBIN, POC UA: NEGATIVE
BLOOD, POC UA: NEGATIVE
BUN SERPL-MCNC: 7 MG/DL (ref 7–22)
CALCIUM SERPL-MCNC: 10.3 MG/DL (ref 8–10.3)
CHLORIDE SERPL-SCNC: 104 MMOL/L (ref 98–108)
CLARITY, UA: CLEAR
COLOR, UA: YELLOW
CREAT SERPL-MCNC: 0.9 MG/DL (ref 0.6–1.2)
CTP QC/QA: YES
GLUCOSE SERPL-MCNC: 129 MG/DL (ref 73–118)
GLUCOSE, POC UA: NEGATIVE
HCO3 UR-SCNC: 25 MMOL/L (ref 24–28)
HCT, POC: NORMAL
HGB, POC: NORMAL (ref 14–18)
KETONES, POC UA: NEGATIVE
LDH SERPL L TO P-CCNC: 1.88 MMOL/L (ref 0.5–2.2)
LEUKOCYTE EST, POC UA: NEGATIVE
MCH, POC: NORMAL
MCHC, POC: NORMAL
MCV, POC: NORMAL
MPV, POC: NORMAL
NITRITE, POC UA: NEGATIVE
PCO2 BLDA: 49.3 MMHG (ref 35–45)
PH SMN: 7.31 [PH] (ref 7.35–7.45)
PH UR STRIP: 5.5 [PH] (ref 5–8)
PO2 BLDA: 17 MMHG (ref 40–60)
POC ALT (SGPT): 23 U/L (ref 10–47)
POC ALT (SGPT): 24 U/L (ref 10–47)
POC AMYLASE: 113 U/L (ref 14–97)
POC AST (SGOT): 21 U/L (ref 11–38)
POC AST (SGOT): 25 U/L (ref 11–38)
POC BE: -2 MMOL/L (ref -2–2)
POC BETA-HCG (QUANT): <5 IU/L
POC GGT: 30 U/L (ref 5–65)
POC PLATELET COUNT: NORMAL
POC SATURATED O2: 20 % (ref 95–100)
POC TCO2: 26 MMOL/L (ref 18–33)
POC TCO2: 26 MMOL/L (ref 24–29)
POTASSIUM BLD-SCNC: 4.5 MMOL/L (ref 3.6–5.1)
PROTEIN, POC UA: NEGATIVE
PROTEIN, POC: 7.8 G/DL (ref 6.4–8.1)
PROTEIN, POC: 8 G/DL (ref 6.4–8.1)
RBC, POC: NORMAL
RDW, POC: NORMAL
SAMPLE: ABNORMAL
SAMPLE: NORMAL
SITE: ABNORMAL
SODIUM BLD-SCNC: 140 MMOL/L (ref 128–145)
SPECIFIC GRAVITY, POC UA: 1.01 (ref 1–1.03)
UROBILINOGEN, POC UA: 0.2 E.U./DL
WBC, POC: NORMAL

## 2025-05-10 PROCEDURE — 96374 THER/PROPH/DIAG INJ IV PUSH: CPT | Mod: ER

## 2025-05-10 PROCEDURE — 99285 EMERGENCY DEPT VISIT HI MDM: CPT | Mod: 25,ER

## 2025-05-10 PROCEDURE — 96372 THER/PROPH/DIAG INJ SC/IM: CPT | Performed by: EMERGENCY MEDICINE

## 2025-05-10 PROCEDURE — 81025 URINE PREGNANCY TEST: CPT | Mod: ER

## 2025-05-10 PROCEDURE — 96361 HYDRATE IV INFUSION ADD-ON: CPT | Mod: ER

## 2025-05-10 PROCEDURE — 82040 ASSAY OF SERUM ALBUMIN: CPT | Mod: 59,ER

## 2025-05-10 PROCEDURE — 81025 URINE PREGNANCY TEST: CPT | Mod: ER | Performed by: EMERGENCY MEDICINE

## 2025-05-10 PROCEDURE — 80053 COMPREHEN METABOLIC PANEL: CPT | Mod: ER

## 2025-05-10 PROCEDURE — 82803 BLOOD GASES ANY COMBINATION: CPT | Mod: ER

## 2025-05-10 PROCEDURE — 82150 ASSAY OF AMYLASE: CPT | Mod: ER

## 2025-05-10 PROCEDURE — 63600175 PHARM REV CODE 636 W HCPCS: Mod: JZ,TB,ER | Performed by: EMERGENCY MEDICINE

## 2025-05-10 PROCEDURE — 25000003 PHARM REV CODE 250: Mod: ER | Performed by: EMERGENCY MEDICINE

## 2025-05-10 PROCEDURE — 85025 COMPLETE CBC W/AUTO DIFF WBC: CPT | Mod: ER

## 2025-05-10 PROCEDURE — 25500020 PHARM REV CODE 255: Mod: ER | Performed by: EMERGENCY MEDICINE

## 2025-05-10 PROCEDURE — 83605 ASSAY OF LACTIC ACID: CPT | Mod: ER

## 2025-05-10 PROCEDURE — 96375 TX/PRO/DX INJ NEW DRUG ADDON: CPT | Mod: ER

## 2025-05-10 RX ORDER — DICYCLOMINE HYDROCHLORIDE 20 MG/1
20 TABLET ORAL 3 TIMES DAILY PRN
Qty: 20 TABLET | Refills: 0 | Status: SHIPPED | OUTPATIENT
Start: 2025-05-10 | End: 2025-05-12

## 2025-05-10 RX ORDER — FAMOTIDINE 10 MG/ML
40 INJECTION, SOLUTION INTRAVENOUS
Status: COMPLETED | OUTPATIENT
Start: 2025-05-10 | End: 2025-05-10

## 2025-05-10 RX ORDER — ONDANSETRON HYDROCHLORIDE 2 MG/ML
8 INJECTION, SOLUTION INTRAVENOUS
Status: COMPLETED | OUTPATIENT
Start: 2025-05-10 | End: 2025-05-10

## 2025-05-10 RX ORDER — AMOXICILLIN AND CLAVULANATE POTASSIUM 875; 125 MG/1; MG/1
1 TABLET, FILM COATED ORAL 2 TIMES DAILY
Qty: 20 TABLET | Refills: 0 | Status: SHIPPED | OUTPATIENT
Start: 2025-05-10 | End: 2025-05-10

## 2025-05-10 RX ORDER — ONDANSETRON 8 MG/1
8 TABLET, ORALLY DISINTEGRATING ORAL EVERY 6 HOURS PRN
Qty: 12 TABLET | Refills: 0 | Status: SHIPPED | OUTPATIENT
Start: 2025-05-10 | End: 2025-05-12

## 2025-05-10 RX ORDER — DICYCLOMINE HYDROCHLORIDE 10 MG/ML
20 INJECTION INTRAMUSCULAR
Status: COMPLETED | OUTPATIENT
Start: 2025-05-10 | End: 2025-05-10

## 2025-05-10 RX ORDER — AMOXICILLIN AND CLAVULANATE POTASSIUM 875; 125 MG/1; MG/1
1 TABLET, FILM COATED ORAL 2 TIMES DAILY
Qty: 20 TABLET | Refills: 0 | Status: SHIPPED | OUTPATIENT
Start: 2025-05-10 | End: 2025-05-20

## 2025-05-10 RX ORDER — ACETAMINOPHEN 500 MG
500 TABLET ORAL EVERY 6 HOURS PRN
Qty: 30 TABLET | Refills: 0 | Status: SHIPPED | OUTPATIENT
Start: 2025-05-10

## 2025-05-10 RX ORDER — KETOROLAC TROMETHAMINE 30 MG/ML
15 INJECTION, SOLUTION INTRAMUSCULAR; INTRAVENOUS
Status: COMPLETED | OUTPATIENT
Start: 2025-05-10 | End: 2025-05-10

## 2025-05-10 RX ORDER — KETOROLAC TROMETHAMINE 30 MG/ML
30 INJECTION, SOLUTION INTRAMUSCULAR; INTRAVENOUS
Status: DISCONTINUED | OUTPATIENT
Start: 2025-05-10 | End: 2025-05-10

## 2025-05-10 RX ORDER — PROMETHAZINE HYDROCHLORIDE 25 MG/1
25 SUPPOSITORY RECTAL EVERY 6 HOURS PRN
Qty: 10 SUPPOSITORY | Refills: 0 | Status: SHIPPED | OUTPATIENT
Start: 2025-05-10 | End: 2025-05-12

## 2025-05-10 RX ORDER — FAMOTIDINE 20 MG/1
20 TABLET, FILM COATED ORAL 2 TIMES DAILY
Qty: 20 TABLET | Refills: 0 | Status: SHIPPED | OUTPATIENT
Start: 2025-05-10 | End: 2025-05-12

## 2025-05-10 RX ADMIN — IOHEXOL 75 ML: 350 INJECTION, SOLUTION INTRAVENOUS at 10:05

## 2025-05-10 RX ADMIN — KETOROLAC TROMETHAMINE 15 MG: 30 INJECTION, SOLUTION INTRAMUSCULAR; INTRAVENOUS at 10:05

## 2025-05-10 RX ADMIN — ONDANSETRON 8 MG: 2 INJECTION INTRAMUSCULAR; INTRAVENOUS at 09:05

## 2025-05-10 RX ADMIN — SODIUM CHLORIDE 1000 ML: 9 INJECTION, SOLUTION INTRAVENOUS at 09:05

## 2025-05-10 RX ADMIN — DICYCLOMINE HYDROCHLORIDE 20 MG: 10 INJECTION, SOLUTION INTRAMUSCULAR at 11:05

## 2025-05-10 RX ADMIN — FAMOTIDINE 40 MG: 10 INJECTION, SOLUTION INTRAVENOUS at 09:05

## 2025-05-10 NOTE — Clinical Note
"Kate Mccraryyla" Leo was seen and treated in our emergency department on 5/10/2025.  She may return to work on 05/12/2025.       If you have any questions or concerns, please don't hesitate to call.      Nena Galan, DO"

## 2025-05-10 NOTE — ED PROVIDER NOTES
"Encounter Date: 5/10/2025    SCRIBE #1 NOTE: I, Sally Collins, am scribing for, and in the presence of,  Nena Galan DO. I have scribed the following portions of the note - Other sections scribed: HPI, ROS, PE.       History     Chief Complaint   Patient presents with    Abdominal Pain    Nausea    Vomiting    Diarrhea     A 29 y/o female presents to the ER c/o ABD pain accompanied with N/V/D x 3 days.  Pt reports bile colored emesis x 6 this morning.  Afebrile  Hx of Diverticulitis per pt.      Kate Bailey is a 30 y.o. female with Hx of HLD, diverticulitis, colitis, GERD, and PCOS, who presents to the ED for chief complaint of diffuse lower abdominal "throbbing" pain that began 3 days ago. Patient reports associated N/V/D, hot flashes, and chills. She reports she has 6 episodes of bile-colored emesis this morning. No daily medications. No other exacerbating or alleviating factors. She reports she last had diverticulitis in November 2024. Reports LMP was last month. Denies smoking, EtOH consumption, marijuana use, or illicit drug use. Denies history of pregnancy. Denies fever or other associated symptoms. Reports allergy to penicillin, which causes bilateral foot swelling.     The history is provided by the patient. No  was used.     Review of patient's allergies indicates:   Allergen Reactions    Diflucan [fluconazole]      Facial Swelling     Past Medical History:   Diagnosis Date    Migraine     PCOS (polycystic ovarian syndrome)     Recurrent epistaxis      Past Surgical History:   Procedure Laterality Date    ADENOIDECTOMY      got eggs frozen Bilateral 07/2018    TONSILLECTOMY       Family History   Problem Relation Name Age of Onset    Cancer Paternal Grandfather          breast     Hypertension Maternal Grandmother       Social History[1]  Review of Systems   Constitutional:  Positive for chills. Negative for fever.        (+) hot flashes   HENT:  Negative for rhinorrhea and sore " throat.    Eyes:  Negative for redness.   Respiratory:  Negative for shortness of breath.    Cardiovascular:  Negative for chest pain and leg swelling.   Gastrointestinal:  Positive for abdominal pain, diarrhea, nausea and vomiting.   Musculoskeletal:  Negative for back pain.   Skin:  Negative for rash.   Neurological:  Negative for syncope and headaches.   All other systems reviewed and are negative.      Physical Exam     Patient gave consent to have physical exam performed.    Initial Vitals [05/10/25 0904]   BP Pulse Resp Temp SpO2   116/69 81 18 98.6 °F (37 °C) 100 %      MAP       --         Physical Exam    Nursing note and vitals reviewed.  Constitutional: She appears well-developed and well-nourished.   HENT:   Head: Normocephalic and atraumatic.   Right Ear: External ear normal.   Left Ear: External ear normal.   Nose: Nose normal. Mouth/Throat: Oropharynx is clear and moist.   Eyes: Conjunctivae and EOM are normal. Pupils are equal, round, and reactive to light.   Neck: Phonation normal. Neck supple.   Normal range of motion.  Cardiovascular:  Normal rate, regular rhythm, normal heart sounds and intact distal pulses.     Exam reveals no gallop and no friction rub.       No murmur heard.  Pulmonary/Chest: Effort normal and breath sounds normal. No stridor. No respiratory distress. She has no wheezes. She has no rhonchi. She has no rales. She exhibits no tenderness.   Abdominal: Abdomen is soft. Bowel sounds are normal. She exhibits no distension. There is abdominal tenderness in the right lower quadrant and left lower quadrant. There is no rigidity, no rebound and no guarding.   Musculoskeletal:         General: No tenderness or edema. Normal range of motion.      Cervical back: Normal range of motion and neck supple.     Neurological: She is alert and oriented to person, place, and time. She has normal strength. No cranial nerve deficit or sensory deficit. GCS score is 15. GCS eye subscore is 4. GCS  verbal subscore is 5. GCS motor subscore is 6.   Skin: Skin is warm and dry. Capillary refill takes less than 2 seconds. No rash noted.   Psychiatric: She has a normal mood and affect. Her behavior is normal.         ED Course   Procedures  Labs Reviewed   ISTAT PROCEDURE - Abnormal       Result Value    POC PH 7.312 (*)     POC PCO2 49.3 (*)     POC PO2 17 (*)     POC HCO3 25.0      POC BE -2      POC SATURATED O2 20      POC Lactate 1.88      POC TCO2 26      Sample VENOUS      Site Other      Allens Test N/A     POCT LIVER PANEL - Abnormal    Albumin, POC 4.5      Alkaline Phosphatase, POC 69      ALT (SGPT), POC 24      Amylase,  (*)     AST (SGOT), POC 21      POC GGT 30      Bilirubin, POC 0.9      Protein, POC 7.8     POCT CMP - Abnormal    Albumin, POC 4.3      Alkaline Phosphatase, POC 60      ALT (SGPT), POC 23      AST (SGOT), POC 25      POC BUN 7      Calcium, POC 10.3      POC Chloride 104      POC Creatinine 0.9      POC Glucose 129 (*)     POC Potassium 4.5      POC Sodium 140      Bilirubin, POC 0.8      POC TCO2 26      Protein, POC 8.0     POCT URINE PREGNANCY    POC Preg Test, Ur Negative       Acceptable Yes     POCT CBC    Hematocrit        Hemoglobin        RBC        WBC        MCV        MCH, POC        MCHC        RDW-CV        Platelet Count, POC        MPV       POCT CMP   POCT LIVER PANEL   POCT B-HCG (QUANT)   POCT B-HCG (QUANT)    POC Beta-HCG (Quant) <5.0      Sample unknown     POCT URINALYSIS W/O SCOPE    Glucose, UA Negative      Bilirubin, UA Negative      Ketones, UA Negative      Spec Grav UA 1.010      Blood, UA Negative      PH, UA 5.5      Protein, UA Negative      Urobilinogen, UA 0.2      Nitrite, UA Negative      Leukocytes, UA Negative      Color, UA POC Yellow      Clarity, UA, POC Clear     POCT URINALYSIS W/O SCOPE          Imaging Results              CT Abdomen Pelvis With IV Contrast NO Oral Contrast (Final result)  Result time 05/10/25  11:05:51      Final result by Michele Pichardo MD (05/10/25 11:05:51)                   Impression:      No definite CT evidence of acute appendicitis.    Possible diffuse mild colitis with colonic diverticulosis.  No definite CT evidence of focal diverticulitis.  Correlate clinically and with outpatient GI consult.    Pelvic physiologic changes.      Electronically signed by: Michele Pichardo MD  Date:    05/10/2025  Time:    11:05               Narrative:    EXAMINATION:  CT ABDOMEN PELVIS WITH IV CONTRAST    CLINICAL HISTORY:  Nausea/vomiting;RLQ abdominal pain (Age >= 14y);    TECHNIQUE:  Low dose axial images, sagittal and coronal reformations were obtained from the lung bases to the pubic symphysis following the IV administration of 75 mL of Omnipaque 350 .  Oral contrast was not given.    COMPARISON:  06/20/2024 and 09/27/2024    FINDINGS:  Visualized lower chest is unchanged.    Within the abdomen, the liver and spleen are unremarkable. Pancreas is normal. Adrenal glands are unremarkable. Gallbladder normal. No biliary dilatation.    Kidneys are unremarkable.    Stomach is normal. Small bowel is nonobstructive.  Colon is compressed throughout accentuating wall thickness with diffuse colitis not excluded.  Diverticular changes present without focal diverticulitis.  No definite CT evidence of appendicitis.    No free fluid or adenopathy present.    Within the pelvis, urinary bladder is unremarkable.  No pelvic mass.  2 cm dominant right ovarian follicle with small amount of pelvic free fluid, presumed physiologic and similar in appearance to 09/27/2024 exam.    Osseous structures demonstrate no acute abnormality..                                       Medications   sodium chloride 0.9% bolus 1,000 mL 1,000 mL (0 mLs Intravenous Stopped 5/10/25 1022)   ondansetron injection 8 mg (8 mg Intravenous Given 5/10/25 0922)   famotidine (PF) injection 40 mg (40 mg Intravenous Given 5/10/25 0922)   iohexoL  (OMNIPAQUE 350) injection 100 mL (75 mLs Intravenous Given 5/10/25 1049)   ketorolac injection 15 mg (15 mg Intravenous Given 5/10/25 1025)   dicyclomine injection 20 mg (20 mg Intramuscular Given 5/10/25 1132)     Medical Decision Making  Amount and/or Complexity of Data Reviewed  Labs: ordered. Decision-making details documented in ED Course.  Radiology: ordered. Decision-making details documented in ED Course.    Risk  OTC drugs.  Prescription drug management.    Medical Decision Making  Patient: 30 y.o.-year-old female  Chief Complaint:   Chief Complaint   Patient presents with    Abdominal Pain    Nausea    Vomiting    Diarrhea     A 29 y/o female presents to the ER c/o ABD pain accompanied with N/V/D x 3 days.  Pt reports bile colored emesis x 6 this morning.  Afebrile  Hx of Diverticulitis per pt.      Historian:  patient    Refer to Physical exam as documented above.     Vital Signs: Reviewed and reassuring.  Nursing Notes: Reviewed.    Differential Diagnosis Considered:  Pregnancy  Gastritis   Gastroenteritis   Diverticulitis  Diverticulosis   Colitis  Dehydration  OILVA      Disposition Planning  Hospitalization Considered For:  Severe  right lower quadrant abdominal pain with nausea and vomiting  Patient Agreement: Patient agreeable to transfer and admission to [facility] if medically necessary.    Emergency Department Course  Treatment Administered: Physical examination and medications administered:   Medications   sodium chloride 0.9% bolus 1,000 mL 1,000 mL (0 mLs Intravenous Stopped 5/10/25 1022)   ondansetron injection 8 mg (8 mg Intravenous Given 5/10/25 0922)   famotidine (PF) injection 40 mg (40 mg Intravenous Given 5/10/25 0922)   iohexoL (OMNIPAQUE 350) injection 100 mL (75 mLs Intravenous Given 5/10/25 1049)   ketorolac injection 15 mg (15 mg Intravenous Given 5/10/25 1025)   dicyclomine injection 20 mg (20 mg Intramuscular Given 5/10/25 1132)     Response to Treatment: Patient  tolerated PO; reports improvement post-treatment.  External Data Reviewed:  Prior medical records.  Vital signs (integrated into HPI and Physical Exam).  Laboratory Studies: Ordered and reviewed.  Beta hCG less than 5.  UA negative for leukocytes and nitrites.  Radiologic Studies: Ordered and reviewed as indicated.  Per CT no acute appendicitis appreciated.      Risk Assessment  Social determinants of health impacting care: Body mass index is 21.48 kg/m².     Shared Decision-Making  Discussed the following with the patient:  Treatment plan  Medication instructions  Laboratory and imaging results   Recommended Outpatient follow up after emergency department visit  Referral for ongoing care    All questions answered. Patient actively  participated in care decisions.     Discharge Instructions  Prescriptions:   ED Prescriptions       Medication Sig Dispense Start Date End Date Auth. Provider    famotidine (PEPCID) 20 MG tablet Take 1 tablet (20 mg total) by mouth 2 (two) times daily. 20 tablet 5/10/2025 5/10/2026 Nena Galan DO    acetaminophen (TYLENOL) 500 MG tablet Take 1 tablet (500 mg total) by mouth every 6 (six) hours as needed for Pain (and fever). 30 tablet 5/10/2025 -- Nena Galan DO    promethazine (PHENERGAN) 25 MG suppository Place 1 suppository (25 mg total) rectally every 6 (six) hours as needed for Nausea. 10 suppository 5/10/2025 -- Nena Galan DO    dicyclomine (BENTYL) 20 mg tablet Take 1 tablet (20 mg total) by mouth 3 (three) times daily as needed (As needed for abdominal pain and cramps). 20 tablet 5/10/2025 6/9/2025 Nena Galan DO    ondansetron (ZOFRAN-ODT) 8 MG TbDL Take 1 tablet (8 mg total) by mouth every 6 (six) hours as needed (As needed for nausea vomiting). 12 tablet 5/10/2025 5/13/2025 Nena Galan DO    amoxicillin-clavulanate 875-125mg (AUGMENTIN) 875-125 mg per tablet  (Status: Discontinued) Take 1 tablet by mouth 2 (two) times daily. for 10 days 20 tablet 5/10/2025 5/10/2025  Nena Galan,     amoxicillin-clavulanate 875-125mg (AUGMENTIN) 875-125 mg per tablet Take 1 tablet by mouth 2 (two) times daily. for 10 days 20 tablet 5/10/2025 5/20/2025 Nena Galan DO          Instructions:  Fill and take medications as directed.  Return to ED if symptoms worsen or fail to improve.  Follow-Up: PCP or specialist follow-up within 1 day.  Patient Status at Discharge:  Patient reports improvement in symptoms.  Patient passed p.o. challenge.  Patient states she has called for her ride and is ready to go home.    Patient Condition at Transfer/Discharge  Awake, alert, oriented x4.  Speaking clearly in full sentences.  Moving all four extremities spontaneously.    Studies Reviewed Prior to Transfer/Discharge  Laboratory Studies:              I, Dr. Nena Galan, personally performed the services described in this documentation. This document was produced by a scribe under my direction and in my presence. All medical record entries made by the scribe were at my direction and in my presence.  I have reviewed the chart and agree that the record reflects my personal performance and is accurate and complete. Nena Galan DO.     05/10/2025 10:13 AM  DISCLAIMER: This note was prepared with [employer] voice recognition transcription software. Garbled syntax, mangled pronouns, and other bizarre constructions may be attributed to that software system.           Scribe Attestation:   Scribe #1: I performed the above scribed service and the documentation accurately describes the services I performed. I attest to the accuracy of the note.                               Clinical Impression:  Final diagnoses:  [K52.9] Colitis (Primary)  [R11.2, R19.7] Nausea vomiting and diarrhea  [R10.31] Right lower quadrant abdominal pain          ED Disposition Condition    Discharge Stable          ED Prescriptions       Medication Sig Dispense Start Date End Date Auth. Provider    famotidine (PEPCID) 20 MG tablet Take  1 tablet (20 mg total) by mouth 2 (two) times daily. 20 tablet 5/10/2025 5/10/2026 Nena Galan DO    acetaminophen (TYLENOL) 500 MG tablet Take 1 tablet (500 mg total) by mouth every 6 (six) hours as needed for Pain (and fever). 30 tablet 5/10/2025 -- Nena Galan DO    promethazine (PHENERGAN) 25 MG suppository Place 1 suppository (25 mg total) rectally every 6 (six) hours as needed for Nausea. 10 suppository 5/10/2025 -- Nena Galan DO    dicyclomine (BENTYL) 20 mg tablet Take 1 tablet (20 mg total) by mouth 3 (three) times daily as needed (As needed for abdominal pain and cramps). 20 tablet 5/10/2025 6/9/2025 Nena Galan DO    ondansetron (ZOFRAN-ODT) 8 MG TbDL Take 1 tablet (8 mg total) by mouth every 6 (six) hours as needed (As needed for nausea vomiting). 12 tablet 5/10/2025 5/13/2025 Nena Galan DO    amoxicillin-clavulanate 875-125mg (AUGMENTIN) 875-125 mg per tablet  (Status: Discontinued) Take 1 tablet by mouth 2 (two) times daily. for 10 days 20 tablet 5/10/2025 5/10/2025 Nena Galan DO    amoxicillin-clavulanate 875-125mg (AUGMENTIN) 875-125 mg per tablet Take 1 tablet by mouth 2 (two) times daily. for 10 days 20 tablet 5/10/2025 5/20/2025 Nena Galan DO          Follow-up Information    None              [1]   Social History  Tobacco Use    Smoking status: Never    Smokeless tobacco: Never   Substance Use Topics    Alcohol use: No    Drug use: No        Nena Galan DO  05/10/25 1212

## 2025-05-12 ENCOUNTER — HOSPITAL ENCOUNTER (EMERGENCY)
Facility: HOSPITAL | Age: 30
Discharge: HOME | End: 2025-05-12
Attending: EMERGENCY MEDICINE
Payer: COMMERCIAL

## 2025-05-12 VITALS
HEIGHT: 60 IN | HEART RATE: 60 BPM | WEIGHT: 110 LBS | RESPIRATION RATE: 16 BRPM | OXYGEN SATURATION: 99 % | BODY MASS INDEX: 21.6 KG/M2 | TEMPERATURE: 98 F | SYSTOLIC BLOOD PRESSURE: 126 MMHG | DIASTOLIC BLOOD PRESSURE: 80 MMHG

## 2025-05-12 DIAGNOSIS — R10.84 GENERALIZED ABDOMINAL PAIN: Primary | ICD-10-CM

## 2025-05-12 DIAGNOSIS — R11.2 NAUSEA AND VOMITING, UNSPECIFIED VOMITING TYPE: ICD-10-CM

## 2025-05-12 LAB
ALBUMIN SERPL-MCNC: 4.5 G/DL (ref 3.3–5.5)
ALBUMIN SERPL-MCNC: 4.5 G/DL (ref 3.3–5.5)
ALLENS TEST: ABNORMAL
ALLENS TEST: ABNORMAL
ALP SERPL-CCNC: 57 U/L (ref 42–141)
ALP SERPL-CCNC: 65 U/L (ref 42–141)
B-HCG UR QL: NEGATIVE
BILIRUB SERPL-MCNC: 1 MG/DL (ref 0.2–1.6)
BILIRUB SERPL-MCNC: 1 MG/DL (ref 0.2–1.6)
BILIRUBIN, POC UA: NEGATIVE
BLOOD, POC UA: NEGATIVE
BUN SERPL-MCNC: 11 MG/DL (ref 7–22)
CALCIUM SERPL-MCNC: 10.1 MG/DL (ref 8–10.3)
CHLORIDE SERPL-SCNC: 107 MMOL/L (ref 98–108)
CLARITY, UA: ABNORMAL
COLOR, UA: YELLOW
CREAT SERPL-MCNC: 0.7 MG/DL (ref 0.6–1.2)
CTP QC/QA: YES
GLUCOSE SERPL-MCNC: 128 MG/DL (ref 73–118)
GLUCOSE, POC UA: NEGATIVE
HCO3 UR-SCNC: 21.1 MMOL/L (ref 24–28)
HCO3 UR-SCNC: 22.4 MMOL/L (ref 24–28)
HCT, POC: NORMAL
HGB, POC: NORMAL (ref 14–18)
KETONES, POC UA: ABNORMAL
LDH SERPL L TO P-CCNC: 1.29 MMOL/L (ref 0.5–2.2)
LDH SERPL L TO P-CCNC: 2.38 MMOL/L (ref 0.5–2.2)
LEUKOCYTE EST, POC UA: ABNORMAL
MCH, POC: NORMAL
MCHC, POC: NORMAL
MCV, POC: NORMAL
MPV, POC: NORMAL
NITRITE, POC UA: NEGATIVE
PCO2 BLDA: 29.4 MMHG (ref 35–45)
PCO2 BLDA: 35.4 MMHG (ref 35–45)
PH SMN: 7.38 [PH] (ref 7.35–7.45)
PH SMN: 7.49 [PH] (ref 7.35–7.45)
PH UR STRIP: 8.5 [PH] (ref 5–8)
PO2 BLDA: 19 MMHG (ref 40–60)
PO2 BLDA: 34 MMHG (ref 40–60)
POC ALT (SGPT): 14 U/L (ref 10–47)
POC ALT (SGPT): 27 U/L (ref 10–47)
POC AMYLASE: 93 U/L (ref 14–97)
POC AST (SGOT): 28 U/L (ref 11–38)
POC AST (SGOT): 30 U/L (ref 11–38)
POC BE: -3 MMOL/L (ref -2–2)
POC BE: 0 MMOL/L (ref -2–2)
POC GGT: 29 U/L (ref 5–65)
POC PLATELET COUNT: NORMAL
POC SATURATED O2: 34 % (ref 95–100)
POC SATURATED O2: 66 % (ref 95–100)
POC TCO2: 22 MMOL/L (ref 24–29)
POC TCO2: 23 MMOL/L (ref 24–29)
POC TCO2: 25 MMOL/L (ref 18–33)
POTASSIUM BLD-SCNC: 4.2 MMOL/L (ref 3.6–5.1)
PROTEIN, POC UA: ABNORMAL
PROTEIN, POC: 7.5 G/DL (ref 6.4–8.1)
PROTEIN, POC: 7.5 G/DL (ref 6.4–8.1)
RBC, POC: NORMAL
RDW, POC: NORMAL
SAMPLE: ABNORMAL
SAMPLE: ABNORMAL
SITE: ABNORMAL
SITE: ABNORMAL
SODIUM BLD-SCNC: 141 MMOL/L (ref 128–145)
SPECIFIC GRAVITY, POC UA: 1.02 (ref 1–1.03)
UROBILINOGEN, POC UA: 1 E.U./DL
WBC, POC: NORMAL

## 2025-05-12 PROCEDURE — 82803 BLOOD GASES ANY COMBINATION: CPT | Mod: ER

## 2025-05-12 PROCEDURE — 99284 EMERGENCY DEPT VISIT MOD MDM: CPT | Mod: 25,ER

## 2025-05-12 PROCEDURE — 81025 URINE PREGNANCY TEST: CPT | Mod: ER | Performed by: EMERGENCY MEDICINE

## 2025-05-12 PROCEDURE — 96372 THER/PROPH/DIAG INJ SC/IM: CPT | Performed by: EMERGENCY MEDICINE

## 2025-05-12 PROCEDURE — 96361 HYDRATE IV INFUSION ADD-ON: CPT | Mod: ER

## 2025-05-12 PROCEDURE — 96374 THER/PROPH/DIAG INJ IV PUSH: CPT | Mod: ER

## 2025-05-12 PROCEDURE — 25000003 PHARM REV CODE 250: Mod: ER | Performed by: EMERGENCY MEDICINE

## 2025-05-12 PROCEDURE — 96375 TX/PRO/DX INJ NEW DRUG ADDON: CPT | Mod: ER

## 2025-05-12 PROCEDURE — 96376 TX/PRO/DX INJ SAME DRUG ADON: CPT | Mod: ER

## 2025-05-12 PROCEDURE — 63600175 PHARM REV CODE 636 W HCPCS: Mod: ER | Performed by: EMERGENCY MEDICINE

## 2025-05-12 RX ORDER — DICYCLOMINE HYDROCHLORIDE 10 MG/ML
20 INJECTION INTRAMUSCULAR
Status: COMPLETED | OUTPATIENT
Start: 2025-05-12 | End: 2025-05-12

## 2025-05-12 RX ORDER — PROMETHAZINE HYDROCHLORIDE 12.5 MG/1
12.5 SUPPOSITORY RECTAL EVERY 6 HOURS PRN
Qty: 6 SUPPOSITORY | Refills: 0 | Status: SHIPPED | OUTPATIENT
Start: 2025-05-12

## 2025-05-12 RX ORDER — ONDANSETRON 8 MG/1
8 TABLET, ORALLY DISINTEGRATING ORAL EVERY 6 HOURS PRN
Qty: 15 TABLET | Refills: 0 | Status: SHIPPED | OUTPATIENT
Start: 2025-05-12 | End: 2025-05-15

## 2025-05-12 RX ORDER — DICYCLOMINE HYDROCHLORIDE 20 MG/1
20 TABLET ORAL 3 TIMES DAILY PRN
Qty: 20 TABLET | Refills: 0 | Status: SHIPPED | OUTPATIENT
Start: 2025-05-12 | End: 2025-06-11

## 2025-05-12 RX ORDER — ONDANSETRON HYDROCHLORIDE 2 MG/ML
4 INJECTION, SOLUTION INTRAVENOUS
Status: COMPLETED | OUTPATIENT
Start: 2025-05-12 | End: 2025-05-12

## 2025-05-12 RX ORDER — FAMOTIDINE 20 MG/1
20 TABLET, FILM COATED ORAL 2 TIMES DAILY
Qty: 30 TABLET | Refills: 0 | Status: SHIPPED | OUTPATIENT
Start: 2025-05-12 | End: 2026-05-12

## 2025-05-12 RX ORDER — FAMOTIDINE 10 MG/ML
20 INJECTION, SOLUTION INTRAVENOUS
Status: COMPLETED | OUTPATIENT
Start: 2025-05-12 | End: 2025-05-12

## 2025-05-12 RX ADMIN — SODIUM CHLORIDE 1000 ML: 9 INJECTION, SOLUTION INTRAVENOUS at 01:05

## 2025-05-12 RX ADMIN — ONDANSETRON 4 MG: 2 INJECTION INTRAMUSCULAR; INTRAVENOUS at 03:05

## 2025-05-12 RX ADMIN — SODIUM CHLORIDE 1000 ML: 9 INJECTION, SOLUTION INTRAVENOUS at 12:05

## 2025-05-12 RX ADMIN — DICYCLOMINE HYDROCHLORIDE 20 MG: 10 INJECTION, SOLUTION INTRAMUSCULAR at 03:05

## 2025-05-12 RX ADMIN — FAMOTIDINE 20 MG: 10 INJECTION, SOLUTION INTRAVENOUS at 04:05

## 2025-05-12 RX ADMIN — ONDANSETRON 4 MG: 2 INJECTION INTRAMUSCULAR; INTRAVENOUS at 12:05

## 2025-05-12 NOTE — Clinical Note
"Kate Bailey (Kayla) was seen and treated in our emergency department on 5/12/2025.  She may return to work on 05/15/2025.       If you have any questions or concerns, please don't hesitate to call.       RN    "

## 2025-05-12 NOTE — ED PROVIDER NOTES
Encounter Date: 5/12/2025    SCRIBE #1 NOTE: I, Camilla Beach, am scribing for, and in the presence of,  Sherlyn Pedroza MD. I have scribed the following portions of the note - Other sections scribed: HPI,ROS,PE.       History     Chief Complaint   Patient presents with    Emesis     Pt here yesterday for abdominal pain and emesis. Dx with diverticulosis        Kate Bailey is a 30 y.o. female, with a PMHx of migraine and colitis who presents to the ED with complaint of worsening emesis with associated nausea, and abdominal pain. Patient reports she was diagnosed with colitis 2 days ago. Reports being prescribed medications, but she was unable to fill prescriptions due to insurance issues.  Reports she was able to fill the prescribed antibiotics. No other exacerbating or alleviating factors. Denies fever, chills,chest pain, dyspnea, or other associated symptoms.       Per chart review 5/10/25, patient presented to this ED for similar complaint. CT abdomen showed:  1. no evidence of acute appendicitis.   2. Possible diffuse mild colitis with colonic diverticulosis    Patient was discharged with acetaminophen, 10 day course of amoxicillin, phenergan suppositories, Bentyl and Zofran.    The history is provided by the patient. No  was used.     Review of patient's allergies indicates:   Allergen Reactions    Diflucan [fluconazole]      Facial Swelling     Past Medical History:   Diagnosis Date    Migraine     PCOS (polycystic ovarian syndrome)     Recurrent epistaxis      Past Surgical History:   Procedure Laterality Date    ADENOIDECTOMY      got eggs frozen Bilateral 07/2018    TONSILLECTOMY       Family History   Problem Relation Name Age of Onset    Cancer Paternal Grandfather          breast     Hypertension Maternal Grandmother       Social History[1]  Review of Systems   Constitutional:  Negative for activity change, appetite change, chills and fever.   HENT:  Negative for congestion,  rhinorrhea, sneezing and sore throat.    Respiratory:  Negative for cough, choking, shortness of breath and wheezing.    Cardiovascular:  Negative for chest pain and palpitations.   Gastrointestinal:  Positive for abdominal pain, nausea and vomiting. Negative for diarrhea.   Neurological:  Negative for dizziness, syncope, light-headedness and headaches.   All other systems reviewed and are negative.      Physical Exam     Initial Vitals [05/12/25 1154]   BP Pulse Resp Temp SpO2   126/80 72 20 97.9 °F (36.6 °C) 99 %      MAP       --         Physical Exam    Nursing note and vitals reviewed.  Constitutional: She appears well-developed and well-nourished. She appears distressed.   HENT:   Head: Normocephalic and atraumatic.   Eyes: Conjunctivae are normal.   Neck:   Normal range of motion.  Cardiovascular:  Normal rate, regular rhythm and normal heart sounds.           No murmur heard.  Pulmonary/Chest: Breath sounds normal. No respiratory distress.   Abdominal: Abdomen is soft. Bowel sounds are normal. She exhibits no distension. There is abdominal tenderness (diffusely).   Musculoskeletal:         General: No tenderness or edema. Normal range of motion.      Cervical back: Normal range of motion.     Neurological: She is alert and oriented to person, place, and time. GCS score is 15. GCS eye subscore is 4. GCS verbal subscore is 5. GCS motor subscore is 6.   Skin: Skin is warm and dry.   Psychiatric: She has a normal mood and affect. Her behavior is normal.         ED Course   Procedures  Labs Reviewed   ISTAT PROCEDURE - Abnormal       Result Value    POC PH 7.491 (*)     POC PCO2 29.4 (*)     POC PO2 19 (*)     POC HCO3 22.4 (*)     POC BE 0      POC SATURATED O2 34      POC Lactate 2.38 (*)     POC TCO2 23 (*)     Sample VENOUS      Site Other      Allens Test N/A     POCT CMP - Abnormal    Albumin, POC 4.5      Alkaline Phosphatase, POC 65      ALT (SGPT), POC 27      AST (SGOT), POC 30      POC BUN 11       Calcium, POC 10.1      POC Chloride 107      POC Creatinine 0.7      POC Glucose 128 (*)     POC Potassium 4.2      POC Sodium 141      Bilirubin, POC 1.0      POC TCO2 25      Protein, POC 7.5     POCT URINALYSIS W/O SCOPE - Abnormal    Glucose, UA Negative      Bilirubin, UA Negative      Ketones, UA 4+ (*)     Spec Grav UA 1.020      Blood, UA Negative      PH, UA 8.5      Protein, UA Trace (*)     Urobilinogen, UA 1.0      Nitrite, UA Negative      Leukocytes, UA Trace (*)     Color, UA POC Yellow      Clarity, UA, POC Cloudy     ISTAT PROCEDURE - Abnormal    POC PH 7.383      POC PCO2 35.4      POC PO2 34 (*)     POC HCO3 21.1 (*)     POC BE -3 (*)     POC SATURATED O2 66      POC Lactate 1.29      POC TCO2 22 (*)     Sample VENOUS      Site Other      Allens Test N/A     POCT URINE PREGNANCY    POC Preg Test, Ur Negative       Acceptable Yes     POCT CBC    Hematocrit        Hemoglobin        RBC        WBC        MCV        MCH, POC        MCHC        RDW-CV        Platelet Count, POC        MPV       POCT URINALYSIS W/O SCOPE   POCT CMP   POCT LIVER PANEL   POCT LIVER PANEL    Albumin, POC 4.5      Alkaline Phosphatase, POC 57      ALT (SGPT), POC 14      Amylase, POC 93      AST (SGOT), POC 28      POC GGT 29      Bilirubin, POC 1.0      Protein, POC 7.5            Imaging Results    None          Medications   sodium chloride 0.9% bolus 1,000 mL 1,000 mL (0 mLs Intravenous Stopped 5/12/25 1325)   ondansetron injection 4 mg (4 mg Intravenous Given 5/12/25 1225)   dicyclomine injection 20 mg (20 mg Intramuscular Given 5/12/25 1544)   sodium chloride 0.9% bolus 1,000 mL 1,000 mL (0 mLs Intravenous Stopped 5/12/25 1418)   ondansetron injection 4 mg (4 mg Intravenous Given 5/12/25 1503)   famotidine (PF) injection 20 mg (20 mg Intravenous Given 5/12/25 1647)     Medical Decision Making  30F witha PMHx of migraine, reported colitis, who presents to the ED with complaint of worsening emesis with  associated nausea, and abdominal pain. On exam, no fever, diffuse abdominal tenderness noted. In shared decision making with the patient I will CBC, CMP, liver panel, urinalysis, and VBG with lactate. Initial labs with elevated lactate. Given 2L NS then repeated and lactate normalized. No leukocytosis, no OLIVA, no electrolyte abnormalities. No need for repeat imaging. Symptoms improved significantly. Will give paper copies of Rx medications so pt can fill at pharmacy of her choice.    Amount and/or Complexity of Data Reviewed  Independent Historian: parent  External Data Reviewed: labs, radiology and notes.  Labs: ordered. Decision-making details documented in ED Course.    Risk  Prescription drug management.  Diagnosis or treatment significantly limited by social determinants of health.            Scribe Attestation:   Scribe #1: I performed the above scribed service and the documentation accurately describes the services I performed. I attest to the accuracy of the note.                             I, Sherlyn Pedroza MD, personally performed the services described in this documentation. All medical record entries made by the scribe were at my direction and in my presence. I have reviewed the chart and agree that the record reflects my personal performance and is accurate and complete.      DISCLAIMER: This note was prepared with iWOPI voice recognition transcription software. Garbled syntax, mangled pronouns, and other bizarre constructions may be attributed to that software system.    Clinical Impression:  Final diagnoses:  [R10.84] Generalized abdominal pain (Primary)  [R11.2] Nausea and vomiting, unspecified vomiting type          ED Disposition Condition    Discharge Stable          ED Prescriptions       Medication Sig Dispense Start Date End Date Auth. Provider    dicyclomine (BENTYL) 20 mg tablet Take 1 tablet (20 mg total) by mouth 3 (three) times daily as needed (abdominal pain and cramps). 20 tablet 5/12/2025  6/11/2025 Sherlyn Pedroza MD    famotidine (PEPCID) 20 MG tablet Take 1 tablet (20 mg total) by mouth 2 (two) times daily. 30 tablet 5/12/2025 5/12/2026 Sherlyn Pedroza MD    ondansetron (ZOFRAN-ODT) 8 MG TbDL Take 1 tablet (8 mg total) by mouth every 6 (six) hours as needed (nausea vomiting). 15 tablet 5/12/2025 5/15/2025 Sherlyn Pedroza MD    promethazine (PHENERGAN) 12.5 MG Supp Place 1 suppository (12.5 mg total) rectally every 6 (six) hours as needed for Nausea. 6 suppository 5/12/2025 -- Sherlyn Pedroza MD          Follow-up Information       Follow up With Specialties Details Why Contact Info Additional Information    [facility] Family Medicine  If symptoms worsen [address] [facility] [place] [address] [phone] [address]nd Floor                 [1]   Social History  Tobacco Use    Smoking status: Never    Smokeless tobacco: Never   Substance Use Topics    Alcohol use: No    Drug use: No        Sherlyn Pedroza MD  05/12/25 [id number]

## 2025-05-12 NOTE — DISCHARGE INSTRUCTIONS
Take medications as directed. Follow clear liquid diet tonight (attached). If you feel better tomorrow, start the bland diet.

## 2025-06-27 ENCOUNTER — TELEPHONE (OUTPATIENT)
Dept: ENDOSCOPY | Facility: HOSPITAL | Age: 30
End: 2025-06-27
Payer: COMMERCIAL

## 2025-06-27 ENCOUNTER — OFFICE VISIT (OUTPATIENT)
Dept: GASTROENTEROLOGY | Facility: CLINIC | Age: 30
End: 2025-06-27
Payer: COMMERCIAL

## 2025-06-27 VITALS
WEIGHT: 118.81 LBS | SYSTOLIC BLOOD PRESSURE: 119 MMHG | HEART RATE: 73 BPM | BODY MASS INDEX: 23.32 KG/M2 | DIASTOLIC BLOOD PRESSURE: 75 MMHG | HEIGHT: 60 IN

## 2025-06-27 DIAGNOSIS — R10.30 LOWER ABDOMINAL PAIN: ICD-10-CM

## 2025-06-27 DIAGNOSIS — R93.3 ABNORMAL FINDING ON GI TRACT IMAGING: Primary | ICD-10-CM

## 2025-06-27 DIAGNOSIS — K21.9 GASTROESOPHAGEAL REFLUX DISEASE, UNSPECIFIED WHETHER ESOPHAGITIS PRESENT: ICD-10-CM

## 2025-06-27 DIAGNOSIS — K52.9 COLITIS: ICD-10-CM

## 2025-06-27 DIAGNOSIS — R13.10 DYSPHAGIA, UNSPECIFIED TYPE: ICD-10-CM

## 2025-06-27 DIAGNOSIS — K59.00 CONSTIPATION, UNSPECIFIED CONSTIPATION TYPE: ICD-10-CM

## 2025-06-27 DIAGNOSIS — R13.19 ESOPHAGEAL DYSPHAGIA: ICD-10-CM

## 2025-06-27 DIAGNOSIS — K59.04 CHRONIC IDIOPATHIC CONSTIPATION: Primary | ICD-10-CM

## 2025-06-27 DIAGNOSIS — Z12.11 COLON CANCER SCREENING: ICD-10-CM

## 2025-06-27 PROCEDURE — 99999 PR PBB SHADOW E&M-EST. PATIENT-LVL IV: CPT | Mod: PBBFAC,,,

## 2025-06-27 RX ORDER — SODIUM, POTASSIUM,MAG SULFATES 17.5-3.13G
4 SOLUTION, RECONSTITUTED, ORAL ORAL DAILY
Qty: 1 KIT | Refills: 0 | Status: SHIPPED | OUTPATIENT
Start: 2025-06-27 | End: 2025-06-29

## 2025-06-27 NOTE — PROGRESS NOTES
[facility] Consultation Note    Reason for Consult:  The primary encounter diagnosis was Chronic idiopathic constipation. Diagnoses of Colitis, Gastroesophageal reflux disease, unspecified whether esophagitis present, Esophageal dysphagia, and Lower abdominal pain were also pertinent to this visit.    PCP:   No primary care provider on file.   [address] [place] [address]    Referring MD:  Nena Galan Do  [address] [place] [address]    HPI:  This is a 30 y.o. female with hx of diverticulitis, GERD, and PCOS here for ED follow up for lower abdominal pain, associated with N/V/D. CT scan with possible diffuse, mild colitis. She was discharged with Pepcid, bentyl, 10 day course of Augmentin, and antiemetics.      Today, pt reports doing better since discharge. She also had colitis on CT in 09/2024 and possible diverticulitis/colitis on CT in 06/2024. She was supposed to get dual scopes but had insurance coverage issues. States she has suffered with chronic constipation for the past year. She has pebble-like stools about 2-3x/week with incomplete evacuation. States the longest she's gone without a BM is about 2 weeks.  She has tried multiple OTC laxatives/products without improvement. Reports associated lower abdominal cramping and N/V when she is really backed up. Pt also reports early satiety, GERD and intermittent dysphagia. She feels food/liquids getting stuck in her chest. Sometimes it passes, other times she regurgitates. Dysphagia to solids/liquids, eventually passes but has had regurgitation before. Denies known FHx of IBD or GI malignancies.       Objective Findings:    Vital Signs:  /75   Pulse 73   Ht 5' (1.524 m)   Wt 53.9 kg (118 lb 13.3 oz)   BMI 23.21 kg/m²   Body mass index is 23.21 kg/m².    Physical Exam:  General Appearance: Well appearing in no acute distress  Abdomen: Soft, non tender, non distended in all four quadrants. No  hepatosplenomegaly, ascites, or mass.    I have personally reviewed labs and imaging results.     CT Abdomen Pelvis (05/10/2025):  Impression:   No definite CT evidence of acute appendicitis.   Possible diffuse mild colitis with colonic diverticulosis.  No definite CT evidence of focal diverticulitis.  Correlate clinically and with outpatient GI consult.  Pelvic physiologic changes.    CT Abdomen Pelvis (09/27/2025):  Impression:   Mild diffuse wall prominence of the colon, potentially exaggerated by decompressed state or related to a nonspecific colitis.  Recommend correlation with clinical findings and symptoms in this patient with nonlocalized abdominal pain.  Additional findings above.    CT Abdomen Pelvis (06/20/2024):  This report was flagged in Epic as abnormal.  1. There is wall thickening and indistinctness about the proximal to mid descending colon.  There are a few scattered colonic diverticula in the region.  Diverticulitis is a consideration or other nonspecific infectious or inflammatory colitis.  Motion artifact limits evaluation of the region, therefore these findings would need careful clinical correlation.  2. Findings suggest hepatic steatosis, correlation with LFTs recommended.  3. Please see above for several additional findings.    Assessment:  1. Chronic idiopathic constipation    2. Colitis    3. Gastroesophageal reflux disease, unspecified whether esophagitis present    4. Esophageal dysphagia    5. Lower abdominal pain      This is a 30 y.o F here for ED f/u colitis, chronic constipation, abdominal pain, N/V, dysphagia, and GERD. She has had several CT scans over the past year with colitis, possible diverticulitis. No prior calprotectin or endoscopies. Pt agreeable to proceeding with dual scopes to evaluate.     - Ordered dual scopes, extended prep  - Ordered calprotectin  - Start Linzess 145 mcg daily  - Start daily fiber supplement  - Miralax PRN  - Discussed GERD dietary and lifestyle  changes  - Consider PPI after EGD    RTC after scopes    Order summary:  Orders Placed This Encounter    Calprotectin, Stool    linaCLOtide (LINZESS) 145 mcg Cap capsule     Thank you so much for allowing me to participate in the care of Kayla Kiana Williams Sarah Abukhader, PA-C [facility]

## 2025-06-27 NOTE — PATIENT INSTRUCTIONS
Acid Reflux Tips  - Stay upright for at least 3 hours after eating  - Raise the head of the bed 4 to 6 inches    - Decrease excess weight    - Avoid citrus juices, acidic foods, alcohol, chocolate, caffeinated and carbonated beverages, fatty and fried foods   - Avoid tight-fitting clothing  - Avoid cigarettes and other tobacco products  - Take antacids (E.g. Tums, Rolaids, Maalox, Mylanta) for breakthrough symptoms    Constipation Tips  - Start linzess once daily  - Eat more high fiber foods   - Take a Fiber supplement daily (Metamucil, Benefiber, Citrucell, etc)  - Take Miralax as needed in addition to Linzess  - Drink at least 8 cups of water a day  - Get regular physical activity  - Use a stool or Squatty Potty  - Avoid sitting on the toilet >5 minutes to prevent hemorrhoids

## 2025-06-28 ENCOUNTER — PATIENT MESSAGE (OUTPATIENT)
Dept: ENDOSCOPY | Facility: HOSPITAL | Age: 30
End: 2025-06-28
Payer: COMMERCIAL

## 2025-07-07 ENCOUNTER — LAB VISIT (OUTPATIENT)
Dept: LAB | Facility: HOSPITAL | Age: 30
End: 2025-07-07
Payer: COMMERCIAL

## 2025-07-07 DIAGNOSIS — K52.9 COLITIS: ICD-10-CM

## 2025-07-07 PROCEDURE — 83993 ASSAY FOR CALPROTECTIN FECAL: CPT

## 2025-07-09 LAB
CALPROTECTIN INTERP (OHS): ABNORMAL
CALPROTECTIN STOOL (OHS): 274 ΜG/G

## 2025-07-30 ENCOUNTER — TELEPHONE (OUTPATIENT)
Dept: ENDOSCOPY | Facility: HOSPITAL | Age: 30
End: 2025-07-30
Payer: COMMERCIAL

## 2025-07-30 ENCOUNTER — PATIENT MESSAGE (OUTPATIENT)
Dept: ENDOSCOPY | Facility: HOSPITAL | Age: 30
End: 2025-07-30
Payer: COMMERCIAL

## 2025-07-30 NOTE — TELEPHONE ENCOUNTER
"Patient is scheduled for a Colonoscopy/EGD on 9/4/25 with Dr. REAGAN Gallego  [id number]    Chuyita Call PA-C [facility]  Caller: Unspecified (1 month ago)  Procedure: EGD/Colonoscopy    Diagnosis: Abnormal finding on GI tract imaging, Dysphagia, GERD    Procedure Timing: Within 12 weeks    *If within 4 weeks selected, please marcus as high priority*    *If greater than 12 weeks, please select "5-12 weeks" and delay sending until 3 months prior to requested date*    Location: Any Site    Additional Scheduling Information: No scheduling concerns    Prep Specifications:Extended/Constipation prep    Is the patient taking a GLP-1 Agonist:no    Have you attached a patient to this message: yes  "

## 2025-09-03 ENCOUNTER — ANESTHESIA EVENT (OUTPATIENT)
Dept: ENDOSCOPY | Facility: HOSPITAL | Age: 30
End: 2025-09-03
Payer: COMMERCIAL

## 2025-09-04 ENCOUNTER — ANESTHESIA (OUTPATIENT)
Dept: ENDOSCOPY | Facility: HOSPITAL | Age: 30
End: 2025-09-04
Payer: COMMERCIAL

## 2025-09-04 ENCOUNTER — TELEPHONE (OUTPATIENT)
Dept: GASTROENTEROLOGY | Facility: CLINIC | Age: 30
End: 2025-09-04
Payer: COMMERCIAL

## 2025-09-04 PROCEDURE — 63600175 PHARM REV CODE 636 W HCPCS: Performed by: STUDENT IN AN ORGANIZED HEALTH CARE EDUCATION/TRAINING PROGRAM

## 2025-09-04 PROCEDURE — 25000003 PHARM REV CODE 250: Performed by: STUDENT IN AN ORGANIZED HEALTH CARE EDUCATION/TRAINING PROGRAM

## 2025-09-04 RX ORDER — LIDOCAINE HYDROCHLORIDE 20 MG/ML
INJECTION INTRAVENOUS
Status: DISCONTINUED | OUTPATIENT
Start: 2025-09-04 | End: 2025-09-04

## 2025-09-04 RX ORDER — PHENYLEPHRINE HYDROCHLORIDE 10 MG/ML
INJECTION INTRAVENOUS
Status: DISCONTINUED | OUTPATIENT
Start: 2025-09-04 | End: 2025-09-04

## 2025-09-04 RX ORDER — PROPOFOL 10 MG/ML
VIAL (ML) INTRAVENOUS
Status: DISCONTINUED | OUTPATIENT
Start: 2025-09-04 | End: 2025-09-04

## 2025-09-04 RX ADMIN — PROPOFOL 20 MG: 10 INJECTION, EMULSION INTRAVENOUS at 12:09

## 2025-09-04 RX ADMIN — PHENYLEPHRINE HYDROCHLORIDE 200 MCG: 10 INJECTION INTRAVENOUS at 12:09

## 2025-09-04 RX ADMIN — LIDOCAINE HYDROCHLORIDE 100 MG: 20 INJECTION, SOLUTION INTRAVENOUS at 11:09

## 2025-09-04 RX ADMIN — PROPOFOL 30 MG: 10 INJECTION, EMULSION INTRAVENOUS at 12:09

## 2025-09-04 RX ADMIN — PROPOFOL 100 MG: 10 INJECTION, EMULSION INTRAVENOUS at 11:09

## 2025-09-04 RX ADMIN — PROPOFOL 50 MG: 10 INJECTION, EMULSION INTRAVENOUS at 12:09

## 2025-09-04 RX ADMIN — SODIUM CHLORIDE: 0.9 INJECTION, SOLUTION INTRAVENOUS at 11:09

## 2025-09-04 RX ADMIN — PROPOFOL 50 MG: 10 INJECTION, EMULSION INTRAVENOUS at 11:09

## 2025-09-05 ENCOUNTER — TELEPHONE (OUTPATIENT)
Dept: ENDOSCOPY | Facility: HOSPITAL | Age: 30
End: 2025-09-05
Payer: COMMERCIAL

## 2025-09-05 ENCOUNTER — OFFICE VISIT (OUTPATIENT)
Dept: GASTROENTEROLOGY | Facility: CLINIC | Age: 30
End: 2025-09-05
Payer: COMMERCIAL

## 2025-09-05 VITALS
BODY MASS INDEX: 26.07 KG/M2 | SYSTOLIC BLOOD PRESSURE: 111 MMHG | WEIGHT: 112.63 LBS | HEART RATE: 59 BPM | DIASTOLIC BLOOD PRESSURE: 73 MMHG | HEIGHT: 55 IN

## 2025-09-05 DIAGNOSIS — R12 HEARTBURN: ICD-10-CM

## 2025-09-05 DIAGNOSIS — R13.19 ESOPHAGEAL DYSPHAGIA: ICD-10-CM

## 2025-09-05 DIAGNOSIS — R13.10 DYSPHAGIA, UNSPECIFIED TYPE: Primary | ICD-10-CM

## 2025-09-05 DIAGNOSIS — K59.04 CHRONIC IDIOPATHIC CONSTIPATION: ICD-10-CM

## 2025-09-05 DIAGNOSIS — R11.2 NAUSEA AND VOMITING, UNSPECIFIED VOMITING TYPE: Primary | ICD-10-CM

## 2025-09-05 PROCEDURE — 99999 PR PBB SHADOW E&M-EST. PATIENT-LVL IV: CPT | Mod: PBBFAC,,,

## 2025-09-05 RX ORDER — OMEPRAZOLE 20 MG/1
20 CAPSULE, DELAYED RELEASE ORAL DAILY
Qty: 90 CAPSULE | Refills: 3 | Status: SHIPPED | OUTPATIENT
Start: 2025-09-05 | End: 2026-09-05

## 2025-09-05 RX ORDER — PROMETHAZINE HYDROCHLORIDE 12.5 MG/1
12.5 TABLET ORAL EVERY 6 HOURS PRN
Qty: 120 TABLET | Refills: 0 | Status: SHIPPED | OUTPATIENT
Start: 2025-09-05

## 2025-09-05 RX ORDER — PROMETHAZINE HYDROCHLORIDE 12.5 MG/1
12.5 SUPPOSITORY RECTAL EVERY 6 HOURS PRN
Qty: 6 SUPPOSITORY | Refills: 3 | Status: SHIPPED | OUTPATIENT
Start: 2025-09-05

## 2025-09-05 RX ORDER — PROMETHAZINE HYDROCHLORIDE 12.5 MG/1
12.5 SUPPOSITORY RECTAL EVERY 6 HOURS PRN
Qty: 6 SUPPOSITORY | Refills: 3 | Status: SHIPPED | OUTPATIENT
Start: 2025-09-05 | End: 2025-09-05 | Stop reason: CLARIF